# Patient Record
Sex: FEMALE | Race: WHITE | Employment: OTHER
[De-identification: names, ages, dates, MRNs, and addresses within clinical notes are randomized per-mention and may not be internally consistent; named-entity substitution may affect disease eponyms.]

---

## 2017-01-16 ENCOUNTER — NURSE ONLY (OUTPATIENT)
Dept: FAMILY MEDICINE CLINIC | Facility: CLINIC | Age: 67
End: 2017-01-16

## 2017-01-16 VITALS — WEIGHT: 141 LBS | BODY MASS INDEX: 25.79 KG/M2 | TEMPERATURE: 98.5 F

## 2017-01-16 DIAGNOSIS — Z23 NEED FOR PNEUMOCOCCAL VACCINATION: Primary | ICD-10-CM

## 2017-01-16 PROCEDURE — 90471 IMMUNIZATION ADMIN: CPT | Performed by: FAMILY MEDICINE

## 2017-01-16 PROCEDURE — 90670 PCV13 VACCINE IM: CPT | Performed by: FAMILY MEDICINE

## 2017-02-08 ENCOUNTER — OFFICE VISIT (OUTPATIENT)
Dept: GASTROENTEROLOGY | Facility: CLINIC | Age: 67
End: 2017-02-08

## 2017-02-08 VITALS
WEIGHT: 142.8 LBS | OXYGEN SATURATION: 95 % | BODY MASS INDEX: 25.3 KG/M2 | TEMPERATURE: 97.8 F | HEIGHT: 63 IN | HEART RATE: 54 BPM

## 2017-02-08 DIAGNOSIS — K58.9 IRRITABLE BOWEL SYNDROME WITHOUT DIARRHEA: Primary | ICD-10-CM

## 2017-02-08 DIAGNOSIS — R11.0 NAUSEA: ICD-10-CM

## 2017-02-08 PROCEDURE — 99214 OFFICE O/P EST MOD 30 MIN: CPT | Performed by: INTERNAL MEDICINE

## 2017-02-08 ASSESSMENT — ENCOUNTER SYMPTOMS
SORE THROAT: 0
TROUBLE SWALLOWING: 0
SHORTNESS OF BREATH: 0
DIARRHEA: 0
ABDOMINAL PAIN: 0
NAUSEA: 1
CONSTIPATION: 1
VOMITING: 1
BLOOD IN STOOL: 0
SINUS PRESSURE: 0
BACK PAIN: 0
ABDOMINAL DISTENTION: 0
FACIAL SWELLING: 0
RECTAL PAIN: 0
COUGH: 0
VOICE CHANGE: 0
RHINORRHEA: 0
ANAL BLEEDING: 0
ALLERGIC/IMMUNOLOGIC NEGATIVE: 1
RESPIRATORY NEGATIVE: 1

## 2017-02-09 ENCOUNTER — TELEPHONE (OUTPATIENT)
Dept: GASTROENTEROLOGY | Facility: CLINIC | Age: 67
End: 2017-02-09

## 2017-02-15 ENCOUNTER — TELEPHONE (OUTPATIENT)
Dept: FAMILY MEDICINE CLINIC | Facility: CLINIC | Age: 67
End: 2017-02-15

## 2017-02-15 RX ORDER — BENZONATATE 100 MG/1
100 CAPSULE ORAL 3 TIMES DAILY PRN
Qty: 30 CAPSULE | Refills: 0 | Status: SHIPPED | OUTPATIENT
Start: 2017-02-15 | End: 2017-02-22

## 2017-02-15 RX ORDER — AMOXICILLIN 875 MG/1
875 TABLET, COATED ORAL 2 TIMES DAILY
Qty: 20 TABLET | Refills: 0 | Status: SHIPPED | OUTPATIENT
Start: 2017-02-15 | End: 2017-02-25

## 2017-03-13 RX ORDER — PANTOPRAZOLE SODIUM 40 MG/1
40 TABLET, DELAYED RELEASE ORAL DAILY
Qty: 90 TABLET | Refills: 2 | Status: SHIPPED | OUTPATIENT
Start: 2017-03-13 | End: 2018-02-05 | Stop reason: SDUPTHER

## 2017-03-29 ENCOUNTER — OFFICE VISIT (OUTPATIENT)
Dept: FAMILY MEDICINE CLINIC | Age: 67
End: 2017-03-29
Payer: COMMERCIAL

## 2017-03-29 VITALS
DIASTOLIC BLOOD PRESSURE: 70 MMHG | BODY MASS INDEX: 25.38 KG/M2 | SYSTOLIC BLOOD PRESSURE: 110 MMHG | RESPIRATION RATE: 18 BRPM | HEART RATE: 60 BPM | WEIGHT: 141 LBS

## 2017-03-29 DIAGNOSIS — H11.31 SUBCONJUNCTIVAL HEMORRHAGE, RIGHT: ICD-10-CM

## 2017-03-29 DIAGNOSIS — E78.2 MIXED HYPERLIPIDEMIA: Primary | ICD-10-CM

## 2017-03-29 DIAGNOSIS — G89.29 CHRONIC LEFT SHOULDER PAIN: ICD-10-CM

## 2017-03-29 DIAGNOSIS — M25.512 CHRONIC LEFT SHOULDER PAIN: ICD-10-CM

## 2017-03-29 PROCEDURE — 99214 OFFICE O/P EST MOD 30 MIN: CPT | Performed by: NURSE PRACTITIONER

## 2017-03-29 ASSESSMENT — PATIENT HEALTH QUESTIONNAIRE - PHQ9
SUM OF ALL RESPONSES TO PHQ QUESTIONS 1-9: 0
1. LITTLE INTEREST OR PLEASURE IN DOING THINGS: 0
SUM OF ALL RESPONSES TO PHQ9 QUESTIONS 1 & 2: 0
2. FEELING DOWN, DEPRESSED OR HOPELESS: 0

## 2017-03-30 ASSESSMENT — ENCOUNTER SYMPTOMS
WHEEZING: 0
CHEST TIGHTNESS: 0
NAUSEA: 0
VOMITING: 0
BLOOD IN STOOL: 0
ABDOMINAL PAIN: 0
SHORTNESS OF BREATH: 0
EYE REDNESS: 1

## 2017-09-27 ENCOUNTER — OFFICE VISIT (OUTPATIENT)
Dept: FAMILY MEDICINE CLINIC | Age: 67
End: 2017-09-27
Payer: COMMERCIAL

## 2017-09-27 VITALS
HEART RATE: 68 BPM | RESPIRATION RATE: 16 BRPM | DIASTOLIC BLOOD PRESSURE: 60 MMHG | SYSTOLIC BLOOD PRESSURE: 110 MMHG | WEIGHT: 142 LBS | BODY MASS INDEX: 25.16 KG/M2 | HEIGHT: 63 IN

## 2017-09-27 DIAGNOSIS — K21.9 GASTROESOPHAGEAL REFLUX DISEASE WITHOUT ESOPHAGITIS: ICD-10-CM

## 2017-09-27 DIAGNOSIS — Z23 NEED FOR INFLUENZA VACCINATION: ICD-10-CM

## 2017-09-27 DIAGNOSIS — E78.2 MIXED HYPERLIPIDEMIA: Primary | ICD-10-CM

## 2017-09-27 PROCEDURE — 99214 OFFICE O/P EST MOD 30 MIN: CPT | Performed by: NURSE PRACTITIONER

## 2017-09-27 PROCEDURE — 90686 IIV4 VACC NO PRSV 0.5 ML IM: CPT | Performed by: NURSE PRACTITIONER

## 2017-09-27 PROCEDURE — 90471 IMMUNIZATION ADMIN: CPT | Performed by: NURSE PRACTITIONER

## 2017-09-27 ASSESSMENT — ENCOUNTER SYMPTOMS
VOMITING: 0
SHORTNESS OF BREATH: 0
WHEEZING: 0
ABDOMINAL PAIN: 0
NAUSEA: 0

## 2017-09-28 DIAGNOSIS — Z12.39 BREAST CANCER SCREENING: ICD-10-CM

## 2017-09-28 DIAGNOSIS — Z12.39 BREAST CANCER SCREENING: Primary | ICD-10-CM

## 2018-02-05 ENCOUNTER — OFFICE VISIT (OUTPATIENT)
Dept: GASTROENTEROLOGY | Age: 68
End: 2018-02-05
Payer: COMMERCIAL

## 2018-02-05 VITALS
BODY MASS INDEX: 25.82 KG/M2 | HEIGHT: 63 IN | DIASTOLIC BLOOD PRESSURE: 68 MMHG | WEIGHT: 145.7 LBS | HEART RATE: 55 BPM | OXYGEN SATURATION: 96 % | SYSTOLIC BLOOD PRESSURE: 130 MMHG

## 2018-02-05 DIAGNOSIS — K58.9 IRRITABLE BOWEL SYNDROME WITHOUT DIARRHEA: ICD-10-CM

## 2018-02-05 DIAGNOSIS — Z80.0 FAMILY HISTORY OF COLON CANCER: Primary | ICD-10-CM

## 2018-02-05 DIAGNOSIS — K57.30 DIVERTICULOSIS OF COLON: ICD-10-CM

## 2018-02-05 DIAGNOSIS — R11.0 NAUSEA: ICD-10-CM

## 2018-02-05 PROCEDURE — 99214 OFFICE O/P EST MOD 30 MIN: CPT | Performed by: INTERNAL MEDICINE

## 2018-02-05 RX ORDER — PANTOPRAZOLE SODIUM 40 MG/1
40 TABLET, DELAYED RELEASE ORAL DAILY
Qty: 90 TABLET | Refills: 2 | Status: SHIPPED | OUTPATIENT
Start: 2018-02-05 | End: 2020-02-04 | Stop reason: SDUPTHER

## 2018-02-05 ASSESSMENT — ENCOUNTER SYMPTOMS
BACK PAIN: 0
RHINORRHEA: 0
SINUS PAIN: 0
DIARRHEA: 0
RECTAL PAIN: 0
CONSTIPATION: 0
NAUSEA: 1
COUGH: 0
ABDOMINAL PAIN: 0
VOICE CHANGE: 0
FACIAL SWELLING: 0
VOMITING: 0
RESPIRATORY NEGATIVE: 1
TROUBLE SWALLOWING: 0
WHEEZING: 0
ANAL BLEEDING: 0
SINUS PRESSURE: 0
SORE THROAT: 0
ALLERGIC/IMMUNOLOGIC NEGATIVE: 1
SHORTNESS OF BREATH: 0
ABDOMINAL DISTENTION: 0
BLOOD IN STOOL: 0

## 2018-02-06 RX ORDER — POLYETHYLENE GLYCOL 3350 17 G/17G
POWDER, FOR SOLUTION ORAL
Qty: 255 G | Refills: 0 | Status: SHIPPED | OUTPATIENT
Start: 2018-02-06 | End: 2018-03-07

## 2018-02-09 ENCOUNTER — HOSPITAL ENCOUNTER (OUTPATIENT)
Age: 68
Setting detail: OUTPATIENT SURGERY
Discharge: HOME | End: 2018-02-09
Attending: INTERNAL MEDICINE | Admitting: INTERNAL MEDICINE
Payer: COMMERCIAL

## 2018-02-09 VITALS
BODY MASS INDEX: 25.34 KG/M2 | OXYGEN SATURATION: 100 % | HEIGHT: 63 IN | WEIGHT: 143 LBS | RESPIRATION RATE: 16 BRPM | SYSTOLIC BLOOD PRESSURE: 123 MMHG | HEART RATE: 55 BPM | DIASTOLIC BLOOD PRESSURE: 61 MMHG | TEMPERATURE: 97.9 F

## 2018-02-09 PROCEDURE — 2580000003 HC RX 258: Performed by: INTERNAL MEDICINE

## 2018-02-09 PROCEDURE — 99153 MOD SED SAME PHYS/QHP EA: CPT | Performed by: INTERNAL MEDICINE

## 2018-02-09 PROCEDURE — 6360000002 HC RX W HCPCS: Performed by: INTERNAL MEDICINE

## 2018-02-09 PROCEDURE — 99152 MOD SED SAME PHYS/QHP 5/>YRS: CPT | Performed by: INTERNAL MEDICINE

## 2018-02-09 PROCEDURE — 7100000011 HC PHASE II RECOVERY - ADDTL 15 MIN: Performed by: INTERNAL MEDICINE

## 2018-02-09 PROCEDURE — 88305 TISSUE EXAM BY PATHOLOGIST: CPT

## 2018-02-09 PROCEDURE — 7100000010 HC PHASE II RECOVERY - FIRST 15 MIN: Performed by: INTERNAL MEDICINE

## 2018-02-09 PROCEDURE — 3609010600 HC COLONOSCOPY POLYPECTOMY SNARE/COLD BIOPSY: Performed by: INTERNAL MEDICINE

## 2018-02-09 RX ORDER — SODIUM CHLORIDE 9 MG/ML
INJECTION, SOLUTION INTRAVENOUS CONTINUOUS
Status: DISCONTINUED | OUTPATIENT
Start: 2018-02-09 | End: 2018-02-09 | Stop reason: HOSPADM

## 2018-02-09 RX ORDER — FENTANYL CITRATE 50 UG/ML
INJECTION, SOLUTION INTRAMUSCULAR; INTRAVENOUS PRN
Status: DISCONTINUED | OUTPATIENT
Start: 2018-02-09 | End: 2018-02-09 | Stop reason: HOSPADM

## 2018-02-09 RX ORDER — MIDAZOLAM HYDROCHLORIDE 1 MG/ML
INJECTION INTRAMUSCULAR; INTRAVENOUS PRN
Status: DISCONTINUED | OUTPATIENT
Start: 2018-02-09 | End: 2018-02-09 | Stop reason: HOSPADM

## 2018-02-09 RX ADMIN — SODIUM CHLORIDE: 9 INJECTION, SOLUTION INTRAVENOUS at 10:27

## 2018-02-09 ASSESSMENT — PAIN SCALES - GENERAL
PAINLEVEL_OUTOF10: 0
PAINLEVEL_OUTOF10: 0

## 2018-02-09 ASSESSMENT — PAIN - FUNCTIONAL ASSESSMENT: PAIN_FUNCTIONAL_ASSESSMENT: 0-10

## 2018-02-09 NOTE — OP NOTE
The colon was decompressed and the scope was removed. The patient tolerated the procedure and conscious sedation without unusual events. Recommendations/Plan:   1. F/U Biopsies  2. F/U In Office as instructed  3. Discussed with the family  4. High fiber diet   5. Precautions to avoid constipation     Next colonoscopy: 10 years.       Electronically signed by Albina Mcallister MD  on 2/9/2018 at 11:26 AM

## 2018-02-09 NOTE — H&P
GASTROINTESTINAL ENDOSCOPY  4/25/14    esophagitis with bx     FAMILY HISTORY       Family History   Problem Relation Age of Onset    Other Mother      had many stomach surgeries    COPD Father     Cancer Father      colon    Other Brother      sepsis    Cancer Maternal Grandfather      colon     SOCIAL HISTORY       Social History     Social History    Marital status:      Spouse name: N/A    Number of children: N/A    Years of education: N/A     Social History Main Topics    Smoking status: Former Smoker     Quit date: 1/1/1990    Smokeless tobacco: Never Used    Alcohol use Yes      Comment: occasional    Drug use: No    Sexual activity: Not Asked     Other Topics Concern    None     Social History Narrative    None     REVIEW OF SYSTEMS      Allergies   Allergen Reactions    Nickel Rash    Morphine And Related Other (See Comments)    Nalbuphine      Other reaction(s): claustophobia; confusion    Sulfa Antibiotics Hives    Formaldehyde Rash     No current facility-administered medications on file prior to encounter. Current Outpatient Prescriptions on File Prior to Encounter   Medication Sig Dispense Refill    polyethylene glycol (GLYCOLAX) powder Please dispense with 4 dulcolax tabs with this prescription. Use as directed by following your patient instructions given by office. 255 g 0    Cyanocobalamin (VITAMIN B-12 CR PO) Take by mouth      Coenzyme Q10 (COQ10 PO) Take by mouth      pantoprazole (PROTONIX) 40 MG tablet Take 1 tablet by mouth daily 90 tablet 2    Multiple Vitamins-Minerals (MULTIVITAMIN PO) Take by mouth      vitamin D (ERGOCALCIFEROL) 400 UNITS CAPS Take 400 Units by mouth daily      Calcium Carbonate-Vitamin D (CALCIUM + D) 600-200 MG-UNIT TABS Take  by mouth.  dicyclomine (BENTYL) 20 MG tablet Take 1 tablet by mouth every 6 hours 80 tablet 0     General health:  Fairly good. No fever or chills. Skin:  No itching, redness or rash. HEENT:  No headache, epistaxis or sore throat. Glasses. Neck:  No pain, stiffness. Thyroid nodules, benign. Cardiovascular/Respiratory system:  No chest pain, palpitation or shortness of breath. Gastrointestinal tract: See HPI. Genitourinary:  No burning on micturition. No hesitancy, urgency, frequency or discoloration of urine. Locomotor:  No bone or joint pains. No swelling. Neuropsychiatric:  No referable complaints. GENERAL PHYSICAL EXAM:     Vitals: BP (!) 107/52   Pulse 54   Temp 97.2 °F (36.2 °C) (Oral)   Resp 18   Ht 5' 2.5\" (1.588 m)   Wt 143 lb (64.9 kg)   SpO2 95%   BMI 25.74 kg/m²  Body mass index is 25.74 kg/m². GENERAL APPEARANCE:   Lianet Rhodes is 78 y.o.,  female, not obese, nourished, conscious, alert. Does not appear to be distress or pain at this time. Patient resting comfortably in bed. SKIN:  Warm, dry, no cyanosis or jaundice. HEAD:  Normocephalic, atraumatic, no swelling or tenderness. EYES:  Pupils equal, reactive to light and accomodation. Conjunctiva clear. EOMs intact bilaterally. EARS:  No discharge, no marked hearing loss. NOSE:  No rhinorrhea, epistaxis or septal deformity. THROAT:  Mucous membranes moist. No tonsillar erythema or exudates. NECK:  No stiffness, trachea central.  No palpable masses. CHEST:  Symmetrical and equal on expansion. HEART:  Regular rate, rhythm. S1 > S2. No audible murmurs or gallops. LUNGS:  Equal on expansion. Clear to auscultation with no adventitious sounds. ABDOMEN: Normoactive bowel sounds. Soft on palpation. No localized tenderness. No guarding or rigidity. No palpable organomegaly.               LYMPHATICS:  No palpable cervical

## 2018-02-12 LAB — SURGICAL PATHOLOGY REPORT: NORMAL

## 2018-03-20 DIAGNOSIS — Z80.0 FAMILY HISTORY OF COLON CANCER: ICD-10-CM

## 2018-03-27 ENCOUNTER — OFFICE VISIT (OUTPATIENT)
Dept: FAMILY MEDICINE CLINIC | Age: 68
End: 2018-03-27
Payer: COMMERCIAL

## 2018-03-27 VITALS
HEART RATE: 58 BPM | RESPIRATION RATE: 14 BRPM | DIASTOLIC BLOOD PRESSURE: 70 MMHG | WEIGHT: 144.4 LBS | SYSTOLIC BLOOD PRESSURE: 130 MMHG | TEMPERATURE: 98 F | BODY MASS INDEX: 25.99 KG/M2

## 2018-03-27 DIAGNOSIS — I10 ESSENTIAL HYPERTENSION: Primary | ICD-10-CM

## 2018-03-27 DIAGNOSIS — E78.2 MIXED HYPERLIPIDEMIA: ICD-10-CM

## 2018-03-27 PROCEDURE — 99214 OFFICE O/P EST MOD 30 MIN: CPT | Performed by: NURSE PRACTITIONER

## 2018-03-27 ASSESSMENT — ENCOUNTER SYMPTOMS
NAUSEA: 0
WHEEZING: 0
VOMITING: 0
SHORTNESS OF BREATH: 0
ABDOMINAL PAIN: 0

## 2018-03-27 NOTE — PROGRESS NOTES
Subjective:      Patient ID: Miguel Kam is a 78 y.o. female. Chronic Disease Visit Information    BP Readings from Last 3 Encounters:   03/27/18 130/70   02/09/18 123/61   02/05/18 130/68          LDL Cholesterol (mg/dL)   Date Value   01/16/2017 83     LDL Calculated (mg/dL)   Date Value   06/17/2015 75     HDL (mg/dL)   Date Value   01/16/2017 53     BUN (mg/dL)   Date Value   01/16/2017 18     CREATININE (mg/dL)   Date Value   01/16/2017 0.57     Glucose (mg/dL)   Date Value   01/16/2017 101 (H)            Have you changed or started any medications since your last visit including any over-the-counter medicines, vitamins, or herbal medicines? no   Are you having any side effects from any of your medications? -  no  Have you stopped taking any of your medications? Is so, why? -  no    Have you seen any other physician or provider since your last visit? Yes - Records Obtained  Have you had any other diagnostic tests since your last visit? No  Have you been seen in the emergency room and/or had an admission to a hospital since we last saw you?yes  Have you had your annual diabetic retinal (eye) exam? No  Have you had your routine dental cleaning in the past 6 months? yes -     Have you activated your [employer] account? If not, what are your barriers?  Yes     Patient Care Team:  Eino Landau, MD as PCP - General  Lanny Sacks, MD as Consulting Physician (Gastroenterology)  Jillian Winchester MD as Consulting Physician (Rheumatology)  Ntaali Joiner MD (Dermatology)  Heriberto Suazo MD as Consulting Physician (Allergy & Immunology)         Medical History Review  Past Medical, Family, and Social History reviewed and does contribute to the patient presenting condition    Health Maintenance   Topic Date Due    DTaP/Tdap/Td vaccine (1 - Tdap) 07/23/1970    Diabetes screen  07/23/1991    Shingles Vaccine (1 of 2 - 2 Dose Series) 07/23/2001    Breast cancer screen  06/20/2019    Pneumococcal Temp 98 °F (36.7 °C) (Oral)   Resp 14   Wt 144 lb 6.4 oz (65.5 kg)   BMI 25.99 kg/m²   Lab Results   Component Value Date    WBC 6.1 01/16/2017    HGB 13.6 01/16/2017    HCT 41.0 01/16/2017     01/16/2017    CHOL 155 01/16/2017    TRIG 93 01/16/2017    HDL 53 01/16/2017    ALT 15 11/21/2015    AST 15 11/21/2015     (H) 01/16/2017    K 4.6 01/16/2017     (H) 01/16/2017    CREATININE 0.57 01/16/2017    BUN 18 01/16/2017    CO2 28 01/16/2017    TSH 3.67 06/17/2015     Lab Results   Component Value Date    CALCIUM 9.2 01/16/2017     Lab Results   Component Value Date    LDLCALC 75 06/17/2015    LDLCHOLESTEROL 83 01/16/2017         1. Essential hypertension  - stable. Cont to monitor.   - Basic Metabolic Panel; Future    2. Mixed hyperlipidemia  - stable. Cont diet measures. - Lipid Panel;   - Diet, exercise and weight reduction discussed. Requested Prescriptions      No prescriptions requested or ordered in this encounter       There are no discontinued medications. Juan Barrett received counseling on the following healthy behaviors: nutrition and exercise  Reviewed prior labs and health maintenance  Continue current medications, diet and exercise. Discussed use, benefit, and side effects of prescribed medications. Barriers to medication compliance addressed. Patient given educational materials - see patient instructions  Was a self-tracking handout given in paper form or via [employer]? Yes    Requested Prescriptions      No prescriptions requested or ordered in this encounter       All patient questions answered. Patient voiced understanding. Quality Measures    Body mass index is 25.99 kg/m². Normal. Weight control planned discussed medically supervised diet with primary care physician and Healthy diet and regular exercise. BP: 130/70. Blood pressure is normal. Treatment plan consists of No treatment change needed. Fall Risk 11/23/2016   2 or more falls in past year?  no

## 2018-06-26 ENCOUNTER — OFFICE VISIT (OUTPATIENT)
Dept: OBGYN CLINIC | Age: 68
End: 2018-06-26

## 2018-06-26 VITALS
SYSTOLIC BLOOD PRESSURE: 112 MMHG | BODY MASS INDEX: 26.79 KG/M2 | HEIGHT: 62 IN | DIASTOLIC BLOOD PRESSURE: 70 MMHG | WEIGHT: 145.6 LBS

## 2018-06-26 DIAGNOSIS — Z12.31 ENCOUNTER FOR SCREENING MAMMOGRAM FOR BREAST CANCER: ICD-10-CM

## 2018-06-26 DIAGNOSIS — Z01.419 ENCOUNTER FOR GYNECOLOGICAL EXAMINATION: Primary | ICD-10-CM

## 2018-06-26 PROCEDURE — G0101 CA SCREEN;PELVIC/BREAST EXAM: HCPCS | Performed by: NURSE PRACTITIONER

## 2018-06-26 ASSESSMENT — ENCOUNTER SYMPTOMS
CONSTIPATION: 0
ABDOMINAL DISTENTION: 0
DIARRHEA: 0
ABDOMINAL PAIN: 0
BACK PAIN: 0
SHORTNESS OF BREATH: 0
COUGH: 0

## 2018-08-03 ENCOUNTER — HOSPITAL ENCOUNTER (OUTPATIENT)
Dept: MAMMOGRAPHY | Age: 68
Discharge: HOME | End: 2018-08-05
Payer: MEDICARE

## 2018-08-03 DIAGNOSIS — Z12.31 ENCOUNTER FOR SCREENING MAMMOGRAM FOR BREAST CANCER: ICD-10-CM

## 2018-08-03 PROCEDURE — 77063 BREAST TOMOSYNTHESIS BI: CPT

## 2018-09-19 ENCOUNTER — HOSPITAL ENCOUNTER (OUTPATIENT)
Age: 68
Discharge: HOME | End: 2018-09-19
Payer: MEDICARE

## 2018-09-19 DIAGNOSIS — E78.2 MIXED HYPERLIPIDEMIA: ICD-10-CM

## 2018-09-19 DIAGNOSIS — I10 ESSENTIAL HYPERTENSION: ICD-10-CM

## 2018-09-19 LAB
ANION GAP SERPL CALCULATED.3IONS-SCNC: 11 MMOL/L (ref 9–17)
BUN BLDV-MCNC: 17 MG/DL (ref 8–23)
BUN/CREAT BLD: ABNORMAL (ref 9–20)
CALCIUM SERPL-MCNC: 9 MG/DL (ref 8.6–10.4)
CHLORIDE BLD-SCNC: 107 MMOL/L (ref 98–107)
CHOLESTEROL/HDL RATIO: 2.7
CHOLESTEROL: 159 MG/DL
CO2: 27 MMOL/L (ref 20–31)
CREAT SERPL-MCNC: 0.59 MG/DL (ref 0.5–0.9)
GFR AFRICAN AMERICAN: >60 ML/MIN
GFR NON-AFRICAN AMERICAN: >60 ML/MIN
GFR SERPL CREATININE-BSD FRML MDRD: ABNORMAL ML/MIN/{1.73_M2}
GFR SERPL CREATININE-BSD FRML MDRD: ABNORMAL ML/MIN/{1.73_M2}
GLUCOSE BLD-MCNC: 92 MG/DL (ref 70–99)
HDLC SERPL-MCNC: 58 MG/DL
LDL CHOLESTEROL: 83 MG/DL (ref 0–130)
POTASSIUM SERPL-SCNC: 4 MMOL/L (ref 3.7–5.3)
SODIUM BLD-SCNC: 145 MMOL/L (ref 135–144)
TRIGL SERPL-MCNC: 88 MG/DL
VLDLC SERPL CALC-MCNC: NORMAL MG/DL (ref 1–30)

## 2018-09-19 PROCEDURE — 80061 LIPID PANEL: CPT

## 2018-09-19 PROCEDURE — 80048 BASIC METABOLIC PNL TOTAL CA: CPT

## 2018-09-19 PROCEDURE — 36415 COLL VENOUS BLD VENIPUNCTURE: CPT

## 2018-09-26 ENCOUNTER — OFFICE VISIT (OUTPATIENT)
Dept: FAMILY MEDICINE CLINIC | Age: 68
End: 2018-09-26
Payer: MEDICARE

## 2018-09-26 VITALS
RESPIRATION RATE: 14 BRPM | TEMPERATURE: 97.2 F | WEIGHT: 145 LBS | DIASTOLIC BLOOD PRESSURE: 80 MMHG | SYSTOLIC BLOOD PRESSURE: 108 MMHG | HEART RATE: 58 BPM | BODY MASS INDEX: 26.52 KG/M2

## 2018-09-26 DIAGNOSIS — I10 ESSENTIAL HYPERTENSION: ICD-10-CM

## 2018-09-26 DIAGNOSIS — R21 RASH AND NONSPECIFIC SKIN ERUPTION: ICD-10-CM

## 2018-09-26 DIAGNOSIS — Z23 NEED FOR IMMUNIZATION AGAINST INFLUENZA: Primary | ICD-10-CM

## 2018-09-26 DIAGNOSIS — E78.2 MIXED HYPERLIPIDEMIA: ICD-10-CM

## 2018-09-26 PROCEDURE — 3017F COLORECTAL CA SCREEN DOC REV: CPT | Performed by: NURSE PRACTITIONER

## 2018-09-26 PROCEDURE — 1036F TOBACCO NON-USER: CPT | Performed by: NURSE PRACTITIONER

## 2018-09-26 PROCEDURE — G8427 DOCREV CUR MEDS BY ELIG CLIN: HCPCS | Performed by: NURSE PRACTITIONER

## 2018-09-26 PROCEDURE — 99214 OFFICE O/P EST MOD 30 MIN: CPT | Performed by: NURSE PRACTITIONER

## 2018-09-26 PROCEDURE — 1101F PT FALLS ASSESS-DOCD LE1/YR: CPT | Performed by: NURSE PRACTITIONER

## 2018-09-26 PROCEDURE — 1123F ACP DISCUSS/DSCN MKR DOCD: CPT | Performed by: NURSE PRACTITIONER

## 2018-09-26 PROCEDURE — G0008 ADMIN INFLUENZA VIRUS VAC: HCPCS | Performed by: NURSE PRACTITIONER

## 2018-09-26 PROCEDURE — G8399 PT W/DXA RESULTS DOCUMENT: HCPCS | Performed by: NURSE PRACTITIONER

## 2018-09-26 PROCEDURE — 90662 IIV NO PRSV INCREASED AG IM: CPT | Performed by: NURSE PRACTITIONER

## 2018-09-26 PROCEDURE — 1090F PRES/ABSN URINE INCON ASSESS: CPT | Performed by: NURSE PRACTITIONER

## 2018-09-26 PROCEDURE — 4040F PNEUMOC VAC/ADMIN/RCVD: CPT | Performed by: NURSE PRACTITIONER

## 2018-09-26 PROCEDURE — G8419 CALC BMI OUT NRM PARAM NOF/U: HCPCS | Performed by: NURSE PRACTITIONER

## 2018-09-26 RX ORDER — AMOXICILLIN AND CLAVULANATE POTASSIUM 875; 125 MG/1; MG/1
1 TABLET, FILM COATED ORAL 2 TIMES DAILY
Qty: 14 TABLET | Refills: 0 | Status: SHIPPED | OUTPATIENT
Start: 2018-09-26 | End: 2018-10-03

## 2018-09-26 RX ORDER — HYDROXYZINE HYDROCHLORIDE 25 MG/1
25 TABLET, FILM COATED ORAL 3 TIMES DAILY PRN
Qty: 30 TABLET | Refills: 0 | Status: SHIPPED | OUTPATIENT
Start: 2018-09-26 | End: 2018-10-06

## 2018-09-26 RX ORDER — TRIAMCINOLONE ACETONIDE 1 MG/G
CREAM TOPICAL
Qty: 28.4 G | Refills: 0 | Status: SHIPPED | OUTPATIENT
Start: 2018-09-26 | End: 2020-07-20 | Stop reason: ALTCHOICE

## 2018-09-26 ASSESSMENT — PATIENT HEALTH QUESTIONNAIRE - PHQ9
SUM OF ALL RESPONSES TO PHQ QUESTIONS 1-9: 0
2. FEELING DOWN, DEPRESSED OR HOPELESS: 0
1. LITTLE INTEREST OR PLEASURE IN DOING THINGS: 0
SUM OF ALL RESPONSES TO PHQ QUESTIONS 1-9: 0
SUM OF ALL RESPONSES TO PHQ9 QUESTIONS 1 & 2: 0

## 2018-09-26 ASSESSMENT — ENCOUNTER SYMPTOMS
NAUSEA: 0
WHEEZING: 0
SHORTNESS OF BREATH: 0
ABDOMINAL PAIN: 0

## 2018-09-26 NOTE — PROGRESS NOTES
Subjective:      Patient ID: Chilo Garcia is a 80 y.o. female. Vaccine Information Sheet, \"Influenza - Inactivated\"  given to Chilo Garcia, or parent/legal guardian of  Chilo Garcia and verbalized understanding. Patient responses:    Have you ever had a reaction to a flu vaccine? No  Are you able to eat eggs without adverse effects? Yes  Do you have any current illness? No  Have you ever had [place] Syndrome? No    Flu vaccine given per order. Please see immunization tab. HPI  Chronic Disease Visit Information    BP Readings from Last 3 Encounters:   09/26/18 108/80   06/26/18 112/70   03/27/18 130/70          LDL Cholesterol (mg/dL)   Date Value   09/19/2018 83     LDL Calculated (mg/dL)   Date Value   06/17/2015 75     HDL (mg/dL)   Date Value   09/19/2018 58     BUN (mg/dL)   Date Value   09/19/2018 17     CREATININE (mg/dL)   Date Value   09/19/2018 0.59     Glucose (mg/dL)   Date Value   09/19/2018 92            Have you changed or started any medications since your last visit including any over-the-counter medicines, vitamins, or herbal medicines? no   Are you having any side effects from any of your medications? -  no  Have you stopped taking any of your medications? Is so, why? -  no    Have you seen any other physician or provider since your last visit? Yes - Records Obtained  Have you had any other diagnostic tests since your last visit? Yes - Records Obtained  Have you been seen in the emergency room and/or had an admission to a hospital since we last saw you? No  Have you had your annual diabetic retinal (eye) exam? No  Have you had your routine dental cleaning in the past 6 months? yes -     Have you activated your [employer] account? If not, what are your barriers?  Yes     Patient Care Team:  Anant Tong MD as PCP - General  Darren Leo MD as Consulting Physician (Gastroenterology)  Steven Chang MD as Consulting Physician (Rheumatology)  Wai Belcher Pulmonary/Chest: Effort normal and breath sounds normal. No respiratory distress. Abdominal: Soft. There is no tenderness. Musculoskeletal: Normal range of motion. Lymphadenopathy:     She has no cervical adenopathy. Neurological: She is alert. No cranial nerve deficit. Skin: Rash noted. Psychiatric: She has a normal mood and affect. Her behavior is normal.   Nursing note and vitals reviewed. Assessment:      1. Essential hypertension    2. Mixed hyperlipidemia    3. Rash and nonspecific skin eruption            Plan:         BP Readings from Last 3 Encounters:   09/26/18 108/80   06/26/18 112/70   03/27/18 130/70     /80 (Site: Right Upper Arm, Position: Sitting, Cuff Size: Medium Adult)   Pulse 58   Temp 97.2 °F (36.2 °C) (Oral)   Resp 14   Wt 150 lb (68 kg)   BMI 27.44 kg/m²   Lab Results   Component Value Date    WBC 6.1 01/16/2017    HGB 13.6 01/16/2017    HCT 41.0 01/16/2017     01/16/2017    CHOL 159 09/19/2018    TRIG 88 09/19/2018    HDL 58 09/19/2018    ALT 15 11/21/2015    AST 15 11/21/2015     (H) 09/19/2018    K 4.0 09/19/2018     09/19/2018    CREATININE 0.59 09/19/2018    BUN 17 09/19/2018    CO2 27 09/19/2018    TSH 3.67 06/17/2015     Lab Results   Component Value Date    CALCIUM 9.0 09/19/2018     Lab Results   Component Value Date    LDLCALC 75 06/17/2015    LDLCHOLESTEROL 83 09/19/2018         1. Essential hypertension  - stable. Cont to monitor   - discussed low salt intake    2. Mixed hyperlipidemia  - stable cont diet measures. 3. Rash and nonspecific skin eruption  - hydrOXYzine (ATARAX) 25 MG tablet; Take 1 tablet by mouth 3 times daily as needed for Itching  Dispense: 30 tablet; Refill: 0  - triamcinolone (KENALOG) 0.1 % cream; Apply topically 2 times daily. Dispense: 28.4 g; Refill: 0  - amoxicillin-clavulanate (AUGMENTIN) 875-125 MG per tablet; Take 1 tablet by mouth 2 times daily for 7 days  Dispense: 14 tablet;  Refill: 0    Requested Prescriptions     Signed Prescriptions Disp Refills    hydrOXYzine (ATARAX) 25 MG tablet 30 tablet 0     Sig: Take 1 tablet by mouth 3 times daily as needed for Itching    triamcinolone (KENALOG) 0.1 % cream 28.4 g 0     Sig: Apply topically 2 times daily.  amoxicillin-clavulanate (AUGMENTIN) 875-125 MG per tablet 14 tablet 0     Sig: Take 1 tablet by mouth 2 times daily for 7 days       Medications Discontinued During This Encounter   Medication Reason    triamcinolone (KENALOG) 0.1 % ointment MITCHELL Shaw received counseling on the following healthy behaviors: nutrition, exercise and weight reduction  Reviewed prior labs and health maintenance  Continue current medications, diet and exercise. Discussed use, benefit, and side effects of prescribed medications. Barriers to medication compliance addressed. Patient given educational materials - see patient instructions  Was a self-tracking handout given in paper form or via [employer]? Yes    Requested Prescriptions     Signed Prescriptions Disp Refills    hydrOXYzine (ATARAX) 25 MG tablet 30 tablet 0     Sig: Take 1 tablet by mouth 3 times daily as needed for Itching    triamcinolone (KENALOG) 0.1 % cream 28.4 g 0     Sig: Apply topically 2 times daily.  amoxicillin-clavulanate (AUGMENTIN) 875-125 MG per tablet 14 tablet 0     Sig: Take 1 tablet by mouth 2 times daily for 7 days       All patient questions answered. Patient voiced understanding. Quality Measures    Body mass index is 27.44 kg/m². Elevated. Weight control planned discussed medically supervised diet with primary care physician and Healthy diet and regular exercise. BP: 108/80. Blood pressure is normal. Treatment plan consists of Weight Reduction, Dietary Sodium Restriction and No treatment change needed.     Fall Risk 9/26/2018 11/23/2016   2 or more falls in past year? no no   Fall with injury in past year? no no     The patient does not have a history of falls. I did , complete a risk assessment for falls.  A plan of care for falls in-office gait and balance testing performed using The Timed Up and Go Test was negative for increased falls risk- no further intervention is currently indicated, No Treatment plan indicated    Lab Results   Component Value Date    LDLCALC 75 06/17/2015    LDLCHOLESTEROL 83 09/19/2018    (goal LDL reduction with dx if diabetes is 50% LDL reduction)    PHQ Scores 9/26/2018 3/29/2017   PHQ2 Score 0 0   PHQ9 Score 0 0     Interpretation of Total Score Depression Severity: 1-4 = Minimal depression, 5-9 = Mild depression, 10-14 = Moderate depression, 15-19 = Moderately severe depression, 20-27 = Severe depression          Kiki Anton, APRN - CNP

## 2019-03-20 ENCOUNTER — OFFICE VISIT (OUTPATIENT)
Dept: FAMILY MEDICINE CLINIC | Age: 69
End: 2019-03-20
Payer: MEDICARE

## 2019-03-20 VITALS
WEIGHT: 153 LBS | RESPIRATION RATE: 16 BRPM | TEMPERATURE: 97.1 F | BODY MASS INDEX: 27.98 KG/M2 | DIASTOLIC BLOOD PRESSURE: 78 MMHG | SYSTOLIC BLOOD PRESSURE: 118 MMHG | HEART RATE: 54 BPM

## 2019-03-20 DIAGNOSIS — I10 ESSENTIAL HYPERTENSION: Primary | ICD-10-CM

## 2019-03-20 DIAGNOSIS — Z78.0 POSTMENOPAUSE: ICD-10-CM

## 2019-03-20 DIAGNOSIS — E78.2 MIXED HYPERLIPIDEMIA: ICD-10-CM

## 2019-03-20 PROCEDURE — G8427 DOCREV CUR MEDS BY ELIG CLIN: HCPCS | Performed by: NURSE PRACTITIONER

## 2019-03-20 PROCEDURE — 1090F PRES/ABSN URINE INCON ASSESS: CPT | Performed by: NURSE PRACTITIONER

## 2019-03-20 PROCEDURE — 1123F ACP DISCUSS/DSCN MKR DOCD: CPT | Performed by: NURSE PRACTITIONER

## 2019-03-20 PROCEDURE — 4040F PNEUMOC VAC/ADMIN/RCVD: CPT | Performed by: NURSE PRACTITIONER

## 2019-03-20 PROCEDURE — G8482 FLU IMMUNIZE ORDER/ADMIN: HCPCS | Performed by: NURSE PRACTITIONER

## 2019-03-20 PROCEDURE — G8399 PT W/DXA RESULTS DOCUMENT: HCPCS | Performed by: NURSE PRACTITIONER

## 2019-03-20 PROCEDURE — 3017F COLORECTAL CA SCREEN DOC REV: CPT | Performed by: NURSE PRACTITIONER

## 2019-03-20 PROCEDURE — 99214 OFFICE O/P EST MOD 30 MIN: CPT | Performed by: NURSE PRACTITIONER

## 2019-03-20 PROCEDURE — G8419 CALC BMI OUT NRM PARAM NOF/U: HCPCS | Performed by: NURSE PRACTITIONER

## 2019-03-20 PROCEDURE — 1036F TOBACCO NON-USER: CPT | Performed by: NURSE PRACTITIONER

## 2019-03-20 PROCEDURE — 1101F PT FALLS ASSESS-DOCD LE1/YR: CPT | Performed by: NURSE PRACTITIONER

## 2019-03-20 RX ORDER — VALACYCLOVIR HYDROCHLORIDE 1 G/1
1000 TABLET, FILM COATED ORAL 2 TIMES DAILY
COMMUNITY

## 2019-03-20 ASSESSMENT — PATIENT HEALTH QUESTIONNAIRE - PHQ9
2. FEELING DOWN, DEPRESSED OR HOPELESS: 0
1. LITTLE INTEREST OR PLEASURE IN DOING THINGS: 0
SUM OF ALL RESPONSES TO PHQ QUESTIONS 1-9: 0
SUM OF ALL RESPONSES TO PHQ9 QUESTIONS 1 & 2: 0
SUM OF ALL RESPONSES TO PHQ QUESTIONS 1-9: 0

## 2019-03-20 ASSESSMENT — ENCOUNTER SYMPTOMS
VOMITING: 0
ABDOMINAL PAIN: 0
WHEEZING: 0
NAUSEA: 0
SHORTNESS OF BREATH: 0

## 2019-06-28 ENCOUNTER — OFFICE VISIT (OUTPATIENT)
Dept: FAMILY MEDICINE CLINIC | Age: 69
End: 2019-06-28
Payer: MEDICARE

## 2019-06-28 VITALS
BODY MASS INDEX: 28.53 KG/M2 | RESPIRATION RATE: 14 BRPM | HEART RATE: 56 BPM | TEMPERATURE: 97.6 F | DIASTOLIC BLOOD PRESSURE: 68 MMHG | SYSTOLIC BLOOD PRESSURE: 110 MMHG | WEIGHT: 156 LBS

## 2019-06-28 DIAGNOSIS — J20.9 ACUTE BRONCHITIS, UNSPECIFIED ORGANISM: Primary | ICD-10-CM

## 2019-06-28 PROCEDURE — G8419 CALC BMI OUT NRM PARAM NOF/U: HCPCS | Performed by: NURSE PRACTITIONER

## 2019-06-28 PROCEDURE — 1123F ACP DISCUSS/DSCN MKR DOCD: CPT | Performed by: NURSE PRACTITIONER

## 2019-06-28 PROCEDURE — 1036F TOBACCO NON-USER: CPT | Performed by: NURSE PRACTITIONER

## 2019-06-28 PROCEDURE — 1090F PRES/ABSN URINE INCON ASSESS: CPT | Performed by: NURSE PRACTITIONER

## 2019-06-28 PROCEDURE — G8399 PT W/DXA RESULTS DOCUMENT: HCPCS | Performed by: NURSE PRACTITIONER

## 2019-06-28 PROCEDURE — 4040F PNEUMOC VAC/ADMIN/RCVD: CPT | Performed by: NURSE PRACTITIONER

## 2019-06-28 PROCEDURE — G8427 DOCREV CUR MEDS BY ELIG CLIN: HCPCS | Performed by: NURSE PRACTITIONER

## 2019-06-28 PROCEDURE — 3017F COLORECTAL CA SCREEN DOC REV: CPT | Performed by: NURSE PRACTITIONER

## 2019-06-28 PROCEDURE — 99213 OFFICE O/P EST LOW 20 MIN: CPT | Performed by: NURSE PRACTITIONER

## 2019-06-28 RX ORDER — LORATADINE 10 MG/1
10 TABLET ORAL DAILY
Qty: 30 TABLET | Refills: 1 | Status: SHIPPED | OUTPATIENT
Start: 2019-06-28 | End: 2021-04-07

## 2019-06-28 RX ORDER — ALBUTEROL SULFATE 90 UG/1
2 AEROSOL, METERED RESPIRATORY (INHALATION) EVERY 6 HOURS PRN
Qty: 1 INHALER | Refills: 0 | Status: SHIPPED | OUTPATIENT
Start: 2019-06-28 | End: 2020-11-19 | Stop reason: SDUPTHER

## 2019-06-28 RX ORDER — METHYLPREDNISOLONE 4 MG/1
TABLET ORAL
Qty: 1 KIT | Refills: 0 | Status: SHIPPED | OUTPATIENT
Start: 2019-06-28 | End: 2019-07-04

## 2019-06-28 RX ORDER — AZITHROMYCIN 250 MG/1
TABLET, FILM COATED ORAL
Qty: 1 PACKET | Refills: 0 | Status: SHIPPED | OUTPATIENT
Start: 2019-06-28 | End: 2019-07-08

## 2019-06-28 RX ORDER — BENZONATATE 100 MG/1
100 CAPSULE ORAL 3 TIMES DAILY PRN
Qty: 30 CAPSULE | Refills: 0 | Status: SHIPPED | OUTPATIENT
Start: 2019-06-28 | End: 2019-07-08

## 2019-06-28 ASSESSMENT — ENCOUNTER SYMPTOMS
SORE THROAT: 1
NAUSEA: 0
SHORTNESS OF BREATH: 1
COUGH: 1
SINUS PRESSURE: 1
ABDOMINAL PAIN: 0
RHINORRHEA: 1

## 2019-06-28 NOTE — PROGRESS NOTES
States she wakes up at night feeling panicky. Denies fever body ache malaise or nv abd pain, denies any drainage from the ear. Pt is a remote smoker. Review of Systems   Constitutional: Positive for chills. Negative for fever. HENT: Positive for congestion, postnasal drip, rhinorrhea, sinus pressure and sore throat. Respiratory: Positive for cough and shortness of breath. Cardiovascular: Negative for chest pain. Gastrointestinal: Negative for abdominal pain and nausea. Neurological: Negative for dizziness and headaches. Objective:   Physical Exam   Constitutional: She appears well-developed and well-nourished. No distress. HENT:   Right Ear: External ear normal. No tenderness. Left Ear: External ear normal. No tenderness ( TM dulled ). Nose: Nose normal.   Mouth/Throat: Oropharynx is clear and moist.   Eyes: Conjunctivae and EOM are normal. No scleral icterus. Neck: Neck supple. No thyromegaly present. Cardiovascular: Normal rate, regular rhythm and normal heart sounds. Pulmonary/Chest: Effort normal. No respiratory distress. She has wheezes. Rales: scattered    Lymphadenopathy:     She has no cervical adenopathy. Nursing note and vitals reviewed. Assessment:      1.  Acute bronchitis, unspecified organism            Plan:      BP Readings from Last 3 Encounters:   06/28/19 110/68   03/20/19 118/78   09/26/18 108/80     /68 (Site: Left Upper Arm, Position: Sitting, Cuff Size: Medium Adult)   Pulse 56   Temp 97.6 °F (36.4 °C) (Oral)   Resp 14   Wt 156 lb (70.8 kg)   BMI 28.53 kg/m²   Lab Results   Component Value Date    WBC 6.1 01/16/2017    HGB 13.6 01/16/2017    HCT 41.0 01/16/2017     01/16/2017    CHOL 159 09/19/2018    TRIG 88 09/19/2018    HDL 58 09/19/2018    ALT 15 11/21/2015    AST 15 11/21/2015     (H) 09/19/2018    K 4.0 09/19/2018     09/19/2018    CREATININE 0.59 09/19/2018    BUN 17 09/19/2018    CO2 27 09/19/2018    TSH 3.67 06/17/2015     Lab Results   Component Value Date    CALCIUM 9.0 09/19/2018     Lab Results   Component Value Date    LDLCALC 75 06/17/2015    LDLCHOLESTEROL 83 09/19/2018         1. Acute bronchitis, unspecified organism  - albuterol sulfate HFA (PROAIR HFA) 108 (90 Base) MCG/ACT inhaler; Inhale 2 puffs into the lungs every 6 hours as needed for Wheezing  Dispense: 1 Inhaler; Refill: 0  - benzonatate (TESSALON PERLES) 100 MG capsule; Take 1 capsule by mouth 3 times daily as needed for Cough  Dispense: 30 capsule; Refill: 0  - methylPREDNISolone (MEDROL DOSEPACK) 4 MG tablet; Take by mouth. Dispense: 1 kit; Refill: 0  - loratadine (CLARITIN) 10 MG tablet; Take 1 tablet by mouth daily  Dispense: 30 tablet; Refill: 1  - azithromycin (ZITHROMAX) 250 MG tablet; Take 2 tabs on day 1, then 1 tab on days 2-5  Dispense: 1 packet; Refill: 0  - increase fluid and rest     Requested Prescriptions     Signed Prescriptions Disp Refills    albuterol sulfate HFA (PROAIR HFA) 108 (90 Base) MCG/ACT inhaler 1 Inhaler 0     Sig: Inhale 2 puffs into the lungs every 6 hours as needed for Wheezing    benzonatate (TESSALON PERLES) 100 MG capsule 30 capsule 0     Sig: Take 1 capsule by mouth 3 times daily as needed for Cough    methylPREDNISolone (MEDROL DOSEPACK) 4 MG tablet 1 kit 0     Sig: Take by mouth.  loratadine (CLARITIN) 10 MG tablet 30 tablet 1     Sig: Take 1 tablet by mouth daily    azithromycin (ZITHROMAX) 250 MG tablet 1 packet 0     Sig: Take 2 tabs on day 1, then 1 tab on days 2-5       There are no discontinued medications. Discussed use, benefit, and side effects of prescribed medications. Barriers to medication compliance addressed. All patient questions answered. Pt voiced understanding. No follow-ups on file.

## 2019-07-01 ENCOUNTER — OFFICE VISIT (OUTPATIENT)
Dept: OBGYN CLINIC | Age: 69
End: 2019-07-01
Payer: MEDICARE

## 2019-07-01 VITALS
HEIGHT: 62 IN | DIASTOLIC BLOOD PRESSURE: 68 MMHG | WEIGHT: 154 LBS | HEART RATE: 66 BPM | SYSTOLIC BLOOD PRESSURE: 122 MMHG | BODY MASS INDEX: 28.34 KG/M2

## 2019-07-01 DIAGNOSIS — Z12.31 ENCOUNTER FOR SCREENING MAMMOGRAM FOR MALIGNANT NEOPLASM OF BREAST: Primary | ICD-10-CM

## 2019-07-01 DIAGNOSIS — N90.89 LABIAL LESION: ICD-10-CM

## 2019-07-01 DIAGNOSIS — Z01.419 ENCOUNTER FOR WELL WOMAN EXAM WITH ROUTINE GYNECOLOGICAL EXAM: ICD-10-CM

## 2019-07-01 PROCEDURE — G0101 CA SCREEN;PELVIC/BREAST EXAM: HCPCS | Performed by: NURSE PRACTITIONER

## 2019-07-01 ASSESSMENT — ENCOUNTER SYMPTOMS
BACK PAIN: 0
SINUS PRESSURE: 1
CONSTIPATION: 0
DIARRHEA: 0
SINUS PAIN: 1
COUGH: 0
ABDOMINAL PAIN: 0
ABDOMINAL DISTENTION: 0
RHINORRHEA: 1
SHORTNESS OF BREATH: 0

## 2019-07-01 NOTE — PROGRESS NOTES
Negative for chest pain and palpitations. Gastrointestinal: Negative for abdominal distention, abdominal pain, constipation and diarrhea. Genitourinary: Negative for flank pain, frequency, menstrual problem, pelvic pain and vaginal discharge. Musculoskeletal: Negative for back pain. Neurological: Negative for syncope and headaches. Psychiatric/Behavioral: Negative for behavioral problems. Objective:     /68 (Site: Right Upper Arm, Position: Sitting, Cuff Size: Medium Adult)   Pulse 66   Ht 5' 2.01\" (1.575 m)   Wt 154 lb (69.9 kg)   BMI 28.16 kg/m²   Physical Exam   Constitutional: She is oriented to person, place, and time. She appears well-developed and well-nourished. Eyes: Pupils are equal, round, and reactive to light. Neck: No tracheal deviation present. No thyromegaly present. Cardiovascular: Normal rate, regular rhythm and normal heart sounds. Pulmonary/Chest: Effort normal and breath sounds normal. No respiratory distress. Right breast exhibits no inverted nipple. Left breast exhibits no inverted nipple, no mass, no nipple discharge, no skin change and no tenderness. No breast tenderness, discharge or bleeding. Breasts are symmetrical.   Abdominal: Soft. Bowel sounds are normal. She exhibits no distension and no mass. There is no tenderness. There is no CVA tenderness. Hernia confirmed negative in the right inguinal area and confirmed negative in the left inguinal area. Genitourinary:       No breast tenderness, discharge or bleeding. There is no rash or lesion on the right labia. There is no rash or lesion on the left labia. Uterus is not deviated, not enlarged and not fixed. Cervix exhibits no motion tenderness, no discharge and no friability. Right adnexum displays no mass, no tenderness and no fullness. Left adnexum displays no mass, no tenderness and no fullness. No tenderness in the vagina. No vaginal discharge found.    Musculoskeletal: She exhibits no edema or

## 2019-08-07 ENCOUNTER — HOSPITAL ENCOUNTER (OUTPATIENT)
Dept: MAMMOGRAPHY | Age: 69
Discharge: HOME | End: 2019-08-09
Payer: MEDICARE

## 2019-08-07 DIAGNOSIS — Z12.31 ENCOUNTER FOR SCREENING MAMMOGRAM FOR MALIGNANT NEOPLASM OF BREAST: ICD-10-CM

## 2019-08-07 DIAGNOSIS — Z78.0 POSTMENOPAUSE: ICD-10-CM

## 2019-08-07 PROCEDURE — 77080 DXA BONE DENSITY AXIAL: CPT

## 2019-08-07 PROCEDURE — 77063 BREAST TOMOSYNTHESIS BI: CPT

## 2019-09-18 ENCOUNTER — OFFICE VISIT (OUTPATIENT)
Dept: FAMILY MEDICINE CLINIC | Age: 69
End: 2019-09-18
Payer: MEDICARE

## 2019-09-18 VITALS
TEMPERATURE: 97.3 F | HEIGHT: 62 IN | HEART RATE: 56 BPM | WEIGHT: 155 LBS | BODY MASS INDEX: 28.52 KG/M2 | SYSTOLIC BLOOD PRESSURE: 118 MMHG | DIASTOLIC BLOOD PRESSURE: 68 MMHG

## 2019-09-18 DIAGNOSIS — Z00.00 MEDICARE ANNUAL WELLNESS VISIT, INITIAL: Primary | ICD-10-CM

## 2019-09-18 DIAGNOSIS — Z00.00 ROUTINE GENERAL MEDICAL EXAMINATION AT A HEALTH CARE FACILITY: ICD-10-CM

## 2019-09-18 PROCEDURE — 1123F ACP DISCUSS/DSCN MKR DOCD: CPT | Performed by: NURSE PRACTITIONER

## 2019-09-18 PROCEDURE — 3017F COLORECTAL CA SCREEN DOC REV: CPT | Performed by: NURSE PRACTITIONER

## 2019-09-18 PROCEDURE — G0438 PPPS, INITIAL VISIT: HCPCS | Performed by: NURSE PRACTITIONER

## 2019-09-18 PROCEDURE — 4040F PNEUMOC VAC/ADMIN/RCVD: CPT | Performed by: NURSE PRACTITIONER

## 2019-09-18 ASSESSMENT — LIFESTYLE VARIABLES
HOW MANY STANDARD DRINKS CONTAINING ALCOHOL DO YOU HAVE ON A TYPICAL DAY: 0
HOW OFTEN DO YOU HAVE A DRINK CONTAINING ALCOHOL: 2
AUDIT-C TOTAL SCORE: 2
HOW OFTEN DO YOU HAVE SIX OR MORE DRINKS ON ONE OCCASION: 0

## 2019-09-18 ASSESSMENT — PATIENT HEALTH QUESTIONNAIRE - PHQ9
SUM OF ALL RESPONSES TO PHQ QUESTIONS 1-9: 0
SUM OF ALL RESPONSES TO PHQ QUESTIONS 1-9: 0

## 2019-09-18 NOTE — PROGRESS NOTES
triamcinolone (KENALOG) 0.1 % cream Apply topically 2 times daily. Yes DREW Sagastume - CNP   Cyanocobalamin (VITAMIN B-12 CR PO) Take by mouth Yes Historical Provider, MD   Coenzyme Q10 (COQ10 PO) Take by mouth Yes Historical Provider, MD   pantoprazole (PROTONIX) 40 MG tablet Take 1 tablet by mouth daily Yes Ashley Paige MD   Multiple Vitamins-Minerals (MULTIVITAMIN PO) Take by mouth Yes Historical Provider, MD   vitamin D (ERGOCALCIFEROL) 400 UNITS CAPS Take 400 Units by mouth daily Yes Historical Provider, MD   Calcium Carbonate-Vitamin D (CALCIUM + D) 600-200 MG-UNIT TABS Take  by mouth. Yes Historical Provider, MD     Past Medical History:   Diagnosis Date    Anxiety     Diverticulosis of colon     DJD (degenerative joint disease) 1/7/2015    Family history of colon cancer     Generalized OA 1/7/2015    GERD (gastroesophageal reflux disease)     Hyperlipidemia 1/7/2015    Irritable bowel syndrome     Osteoarthritis      Past Surgical History:   Procedure Laterality Date    CHOLECYSTECTOMY      COLONOSCOPY  2004    normal    COLONOSCOPY  4/25/14    diverticulosis    COLONOSCOPY N/A 2/9/2018    COLONOSCOPY POLYPECTOMY COLD BIOPSY performed by Ashley Paige MD at [facility]  02/09/2018    Spasms in the sigmoid colon. No colitis or ileitis seen.     DILATION AND CURETTAGE OF UTERUS  1970    DILATION AND CURETTAGE OF UTERUS  1991    ENDOSCOPY, COLON, DIAGNOSTIC      JOINT REPLACEMENT Right 8/26/2013    hip    TONSILLECTOMY  1954    UPPER GASTROINTESTINAL ENDOSCOPY  2004    normal    UPPER GASTROINTESTINAL ENDOSCOPY  4/25/14    esophagitis with bx     Family History   Problem Relation Age of Onset    Other Mother         had many stomach surgeries    COPD Father     Cancer Father         colon    Other Brother         sepsis    Cancer Maternal Grandfather         colon       CareTeam (Including outside providers/suppliers regularly involved in providing care): Patient Care Team:  Ashleigh Lemus MD as PCP - General  Marquez Garcia, DREW - CNP as PCP - [facility] Empaneled Provider  Durga Valdez MD as Consulting Physician (Gastroenterology)  Criss Almanzar MD as Consulting Physician (Rheumatology)  Sahil Lee MD (Dermatology)  Reynaldo Knight MD as Consulting Physician (Allergy & Immunology)    Wt Readings from Last 3 Encounters:   09/18/19 155 lb (70.3 kg)   07/01/19 154 lb (69.9 kg)   06/28/19 156 lb (70.8 kg)     Vitals:    09/18/19 1328   BP: 118/68   Site: Left Upper Arm   Position: Sitting   Cuff Size: Medium Adult   Pulse: 56   Temp: 97.3 °F (36.3 °C)   TempSrc: Oral   Weight: 155 lb (70.3 kg)   Height: 5' 2\" (1.575 m)     Body mass index is 28.35 kg/m². Based upon direct observation of the patient, evaluation of cognition reveals . Patient's complete Health Risk Assessment and screening values have been reviewed and are found in Flowsheets. The following problems were reviewed today and where indicated follow up appointments were made and/or referrals ordered. Positive Risk Factor Screenings with Interventions:     General Health:  General  In general, how would you say your health is?: Very Good  In the past 7 days, have you experienced any of the following?  New or Increased Pain, New or Increased Fatigue, Loneliness, Social Isolation, Stress or Anger?: (!) Stress, Anger  Do you get the social and emotional support that you need?: (!) No  Do you have a Living Will?: Yes  General Health Risk Interventions:  ·     Safety:  Safety  Do you have working smoke detectors?: Yes  Have all throw rugs been removed or fastened?: (!) No  Do you have non-slip mats or surfaces in all bathtubs/showers?: (!) No  Do all of your stairways have a railing or banister?: Yes  Are your doorways, halls and stairs free of clutter?: Yes  Do you always fasten your seatbelt when you are in a car?: Yes  Safety Interventions:  ·     Personalized Preventive Plan Current Health Maintenance Status  Immunization History   Administered Date(s) Administered    Influenza Virus Vaccine 10/28/2003, 10/30/2007, 09/16/2009, 11/05/2010, 09/27/2011, 09/24/2012    Influenza, High Dose (Fluzone 65 yrs and older) 09/26/2018    Influenza, Quadv, IM, PF (6 mo and older Fluzone, Flulaval, Fluarix, and 3 yrs and older Afluria) 09/27/2017    Pneumococcal Conjugate 13-valent ([id number]) 01/16/2017    Pneumococcal Polysaccharide (Dchnjrdvc33) 08/25/2009, 12/23/2015    Zoster Live (Zostavax) 04/10/2012        Health Maintenance   Topic Date Due    DTaP/Tdap/Td vaccine (1 - Tdap) 07/23/1970    Shingles Vaccine (2 of 3) 06/05/2012    Annual Wellness Visit (AWV)  05/29/2019    Flu vaccine (1) 09/01/2019    Pneumococcal 65+ years Vaccine (2 of 2 - PPSV23) 12/23/2020    Breast cancer screen  08/07/2021    Lipid screen  09/19/2023    Colon cancer screen colonoscopy  02/09/2028    DEXA (modify frequency per FRAX score)  Completed    Hepatitis C screen  Completed     Recommendations for Preventive Services Due: see orders and patient instructions/AVS.  . Recommended screening schedule for the next 5-10 years is provided to the patient in written form: see Patient Evangelina Jarquin was seen today for medicare awv.     Diagnoses and all orders for this visit:    Medicare annual wellness visit, initial    Routine general medical examination at a health care facility

## 2020-01-03 ENCOUNTER — OFFICE VISIT (OUTPATIENT)
Dept: FAMILY MEDICINE CLINIC | Age: 70
End: 2020-01-03
Payer: MEDICARE

## 2020-01-03 VITALS
BODY MASS INDEX: 28.17 KG/M2 | SYSTOLIC BLOOD PRESSURE: 120 MMHG | DIASTOLIC BLOOD PRESSURE: 60 MMHG | TEMPERATURE: 97.7 F | WEIGHT: 154 LBS | HEART RATE: 56 BPM | RESPIRATION RATE: 16 BRPM

## 2020-01-03 PROCEDURE — G8399 PT W/DXA RESULTS DOCUMENT: HCPCS | Performed by: NURSE PRACTITIONER

## 2020-01-03 PROCEDURE — G8417 CALC BMI ABV UP PARAM F/U: HCPCS | Performed by: NURSE PRACTITIONER

## 2020-01-03 PROCEDURE — 1123F ACP DISCUSS/DSCN MKR DOCD: CPT | Performed by: NURSE PRACTITIONER

## 2020-01-03 PROCEDURE — 1036F TOBACCO NON-USER: CPT | Performed by: NURSE PRACTITIONER

## 2020-01-03 PROCEDURE — 4040F PNEUMOC VAC/ADMIN/RCVD: CPT | Performed by: NURSE PRACTITIONER

## 2020-01-03 PROCEDURE — 99213 OFFICE O/P EST LOW 20 MIN: CPT | Performed by: NURSE PRACTITIONER

## 2020-01-03 PROCEDURE — 1090F PRES/ABSN URINE INCON ASSESS: CPT | Performed by: NURSE PRACTITIONER

## 2020-01-03 PROCEDURE — 3017F COLORECTAL CA SCREEN DOC REV: CPT | Performed by: NURSE PRACTITIONER

## 2020-01-03 PROCEDURE — G8484 FLU IMMUNIZE NO ADMIN: HCPCS | Performed by: NURSE PRACTITIONER

## 2020-01-03 PROCEDURE — G8427 DOCREV CUR MEDS BY ELIG CLIN: HCPCS | Performed by: NURSE PRACTITIONER

## 2020-01-03 RX ORDER — GUAIFENESIN AND DEXTROMETHORPHAN HYDROBROMIDE 600; 30 MG/1; MG/1
TABLET, EXTENDED RELEASE ORAL
Qty: 28 TABLET | Refills: 0 | Status: SHIPPED | OUTPATIENT
Start: 2020-01-03 | End: 2020-07-20 | Stop reason: ALTCHOICE

## 2020-01-03 RX ORDER — AZITHROMYCIN 250 MG/1
TABLET, FILM COATED ORAL
Qty: 1 PACKET | Refills: 0 | Status: SHIPPED | OUTPATIENT
Start: 2020-01-03 | End: 2020-01-13

## 2020-01-03 ASSESSMENT — ENCOUNTER SYMPTOMS
SORE THROAT: 1
NAUSEA: 0
RHINORRHEA: 1
SHORTNESS OF BREATH: 0
COUGH: 1
ABDOMINAL PAIN: 0
SINUS PRESSURE: 1

## 2020-01-03 NOTE — PROGRESS NOTES
Subjective:      Patient ID: Theresa Payan is a 77 y.o. female. Visit Information    Have you changed or started any medications since your last visit including any over-the-counter medicines, vitamins, or herbal medicines? no   Are you having any side effects from any of your medications? -  no  Have you stopped taking any of your medications? Is so, why? -  no    Have you seen any other physician or provider since your last visit? No  Have you had any other diagnostic tests since your last visit? Yes - Records Obtained  Have you been seen in the emergency room and/or had an admission to a hospital since we last saw you? No  Have you had your routine dental cleaning in the past 6 months? no    Have you activated your [employer] account? If not, what are your barriers? Yes     Patient Care Team:  Justin Luu MD as PCP - General  Teresa Pineda MD as Consulting Physician (Gastroenterology)  Rea Shaffer MD as Consulting Physician (Rheumatology)  Mckenna Martinez MD (Dermatology)  Alyse Harris MD as Consulting Physician (Allergy & Immunology)    Medical History Review  Past Medical, Family, and Social History reviewed and does not contribute to the patient presenting condition    Health Maintenance   Topic Date Due    DTaP/Tdap/Td vaccine (1 - Tdap) 07/23/1962    Shingles Vaccine (2 of 3) 06/05/2012    Flu vaccine (1) 09/01/2019    Annual Wellness Visit (AWV)  09/17/2020    Pneumococcal 65+ years Vaccine (2 of 2 - PPSV23) 12/23/2020    Breast cancer screen  08/07/2021    Lipid screen  09/19/2023    Colon cancer screen colonoscopy  02/09/2028    DEXA (modify frequency per FRAX score)  Completed    Hepatitis C screen  Completed     HPI    77year old female presents with chills sore throat  nasal congestion/pressure post nasal drainage cough for 1 week. She says cough is productive with thick yellow sputum. She denies fever sob body ache malaise or nv abd pain.      Review of Systems Constitutional: Positive for chills. Negative for fever. HENT: Positive for congestion, postnasal drip, rhinorrhea, sinus pressure and sore throat. Respiratory: Positive for cough. Negative for shortness of breath. Cardiovascular: Negative for chest pain. Gastrointestinal: Negative for abdominal pain and nausea. Neurological: Negative for dizziness and headaches. Objective:   Physical Exam  Vitals signs and nursing note reviewed. Constitutional:       General: She is not in acute distress. Appearance: She is well-developed. HENT:      Head: Normocephalic. Right Ear: External ear normal.      Left Ear: External ear normal.      Nose: Nose normal.   Eyes:      General: No scleral icterus. Conjunctiva/sclera: Conjunctivae normal.   Neck:      Musculoskeletal: Neck supple. Thyroid: No thyromegaly. Cardiovascular:      Rate and Rhythm: Normal rate and regular rhythm. Heart sounds: Normal heart sounds. Pulmonary:      Effort: Pulmonary effort is normal. No respiratory distress. Breath sounds: Normal breath sounds. Lymphadenopathy:      Cervical: No cervical adenopathy. Assessment:      1.  Acute bronchitis, unspecified organism            Plan:      BP Readings from Last 3 Encounters:   01/03/20 120/60   09/18/19 118/68   07/01/19 122/68     /60 (Site: Left Upper Arm, Position: Sitting, Cuff Size: Medium Adult)   Pulse 56   Temp 97.7 °F (36.5 °C) (Oral)   Resp 16   Wt 154 lb (69.9 kg)   BMI 28.17 kg/m²   Lab Results   Component Value Date    WBC 6.1 01/16/2017    HGB 13.6 01/16/2017    HCT 41.0 01/16/2017     01/16/2017    CHOL 159 09/19/2018    TRIG 88 09/19/2018    HDL 58 09/19/2018    ALT 15 11/21/2015    AST 15 11/21/2015     (H) 09/19/2018    K 4.0 09/19/2018     09/19/2018    CREATININE 0.59 09/19/2018    BUN 17 09/19/2018    CO2 27 09/19/2018    TSH 3.67 06/17/2015     Lab Results   Component Value Date    CALCIUM 9.0

## 2020-02-04 ENCOUNTER — OFFICE VISIT (OUTPATIENT)
Dept: GASTROENTEROLOGY | Age: 70
End: 2020-02-04
Payer: MEDICARE

## 2020-02-04 VITALS
HEIGHT: 63 IN | TEMPERATURE: 97.2 F | OXYGEN SATURATION: 95 % | WEIGHT: 154 LBS | BODY MASS INDEX: 27.29 KG/M2 | HEART RATE: 56 BPM | SYSTOLIC BLOOD PRESSURE: 132 MMHG | DIASTOLIC BLOOD PRESSURE: 79 MMHG

## 2020-02-04 PROCEDURE — 1036F TOBACCO NON-USER: CPT | Performed by: INTERNAL MEDICINE

## 2020-02-04 PROCEDURE — 4040F PNEUMOC VAC/ADMIN/RCVD: CPT | Performed by: INTERNAL MEDICINE

## 2020-02-04 PROCEDURE — 1090F PRES/ABSN URINE INCON ASSESS: CPT | Performed by: INTERNAL MEDICINE

## 2020-02-04 PROCEDURE — G8417 CALC BMI ABV UP PARAM F/U: HCPCS | Performed by: INTERNAL MEDICINE

## 2020-02-04 PROCEDURE — 99214 OFFICE O/P EST MOD 30 MIN: CPT | Performed by: INTERNAL MEDICINE

## 2020-02-04 PROCEDURE — G8399 PT W/DXA RESULTS DOCUMENT: HCPCS | Performed by: INTERNAL MEDICINE

## 2020-02-04 PROCEDURE — 1123F ACP DISCUSS/DSCN MKR DOCD: CPT | Performed by: INTERNAL MEDICINE

## 2020-02-04 PROCEDURE — G8484 FLU IMMUNIZE NO ADMIN: HCPCS | Performed by: INTERNAL MEDICINE

## 2020-02-04 PROCEDURE — G8427 DOCREV CUR MEDS BY ELIG CLIN: HCPCS | Performed by: INTERNAL MEDICINE

## 2020-02-04 PROCEDURE — 3017F COLORECTAL CA SCREEN DOC REV: CPT | Performed by: INTERNAL MEDICINE

## 2020-02-04 ASSESSMENT — ENCOUNTER SYMPTOMS
PHOTOPHOBIA: 0
GASTROINTESTINAL NEGATIVE: 1
EYE REDNESS: 0
EYE PAIN: 0
RESPIRATORY NEGATIVE: 1
EYE ITCHING: 0
EYE DISCHARGE: 0

## 2020-02-04 NOTE — PROGRESS NOTES
problems, confusion, decreased concentration, dysphoric mood, hallucinations, self-injury and suicidal ideas. The patient is not nervous/anxious and is not hyperactive. Objective:   Physical Exam  Vitals signs and nursing note reviewed. Constitutional:       Appearance: She is well-developed. HENT:      Head: Normocephalic and atraumatic. Eyes:      General: No scleral icterus. Conjunctiva/sclera: Conjunctivae normal.      Pupils: Pupils are equal, round, and reactive to light. Neck:      Musculoskeletal: Normal range of motion and neck supple. Thyroid: No thyromegaly. Vascular: No hepatojugular reflux or JVD. Trachea: No tracheal deviation. Cardiovascular:      Rate and Rhythm: Normal rate and regular rhythm. Heart sounds: Normal heart sounds. Pulmonary:      Effort: Pulmonary effort is normal. No respiratory distress. Breath sounds: Normal breath sounds. No wheezing or rales. Abdominal:      General: Bowel sounds are normal. There is no distension. Palpations: Abdomen is soft. There is no hepatomegaly or mass. Tenderness: There is no abdominal tenderness. There is no rebound. Hernia: No hernia is present. Musculoskeletal:         General: No tenderness. Comments: No joint swelling   Lymphadenopathy:      Cervical: No cervical adenopathy. Skin:     General: Skin is warm. Findings: No bruising, ecchymosis, erythema or rash. Neurological:      Mental Status: She is alert and oriented to person, place, and time. Cranial Nerves: No cranial nerve deficit. Psychiatric:         Thought Content: Thought content normal.         Assessment:       Diagnosis Orders   1. Irritable bowel syndrome, unspecified type     2. Scleroderma ([address]     3. Chronic GERD             Plan: At present patient appears stable. She had a colonoscopy done 2 years ago and at that time no abnormalities noted.     She is known to have significant spasms in

## 2020-02-06 RX ORDER — PANTOPRAZOLE SODIUM 40 MG/1
40 TABLET, DELAYED RELEASE ORAL DAILY
Qty: 90 TABLET | Refills: 3 | Status: SHIPPED | OUTPATIENT
Start: 2020-02-06 | End: 2021-09-13 | Stop reason: SDUPTHER

## 2020-03-16 ENCOUNTER — TELEPHONE (OUTPATIENT)
Dept: FAMILY MEDICINE CLINIC | Age: 70
End: 2020-03-16

## 2020-03-16 RX ORDER — BENZONATATE 100 MG/1
100 CAPSULE ORAL 3 TIMES DAILY PRN
Qty: 30 CAPSULE | Refills: 0 | Status: SHIPPED | OUTPATIENT
Start: 2020-03-16 | End: 2020-03-23

## 2020-03-16 RX ORDER — AMOXICILLIN 875 MG/1
875 TABLET, COATED ORAL 2 TIMES DAILY
Qty: 20 TABLET | Refills: 0 | Status: SHIPPED | OUTPATIENT
Start: 2020-03-16 | End: 2020-03-26

## 2020-07-20 ENCOUNTER — OFFICE VISIT (OUTPATIENT)
Dept: FAMILY MEDICINE CLINIC | Age: 70
End: 2020-07-20
Payer: MEDICARE

## 2020-07-20 VITALS
TEMPERATURE: 98.1 F | WEIGHT: 152.2 LBS | SYSTOLIC BLOOD PRESSURE: 130 MMHG | BODY MASS INDEX: 27.39 KG/M2 | DIASTOLIC BLOOD PRESSURE: 80 MMHG | RESPIRATION RATE: 16 BRPM | HEART RATE: 58 BPM

## 2020-07-20 PROCEDURE — 4040F PNEUMOC VAC/ADMIN/RCVD: CPT | Performed by: NURSE PRACTITIONER

## 2020-07-20 PROCEDURE — G8417 CALC BMI ABV UP PARAM F/U: HCPCS | Performed by: NURSE PRACTITIONER

## 2020-07-20 PROCEDURE — 3017F COLORECTAL CA SCREEN DOC REV: CPT | Performed by: NURSE PRACTITIONER

## 2020-07-20 PROCEDURE — G8399 PT W/DXA RESULTS DOCUMENT: HCPCS | Performed by: NURSE PRACTITIONER

## 2020-07-20 PROCEDURE — G8510 SCR DEP NEG, NO PLAN REQD: HCPCS | Performed by: NURSE PRACTITIONER

## 2020-07-20 PROCEDURE — 1036F TOBACCO NON-USER: CPT | Performed by: NURSE PRACTITIONER

## 2020-07-20 PROCEDURE — 99214 OFFICE O/P EST MOD 30 MIN: CPT | Performed by: NURSE PRACTITIONER

## 2020-07-20 PROCEDURE — 1090F PRES/ABSN URINE INCON ASSESS: CPT | Performed by: NURSE PRACTITIONER

## 2020-07-20 PROCEDURE — 1123F ACP DISCUSS/DSCN MKR DOCD: CPT | Performed by: NURSE PRACTITIONER

## 2020-07-20 PROCEDURE — G8427 DOCREV CUR MEDS BY ELIG CLIN: HCPCS | Performed by: NURSE PRACTITIONER

## 2020-07-20 SDOH — ECONOMIC STABILITY: FOOD INSECURITY: WITHIN THE PAST 12 MONTHS, THE FOOD YOU BOUGHT JUST DIDN'T LAST AND YOU DIDN'T HAVE MONEY TO GET MORE.: NEVER TRUE

## 2020-07-20 SDOH — ECONOMIC STABILITY: INCOME INSECURITY: HOW HARD IS IT FOR YOU TO PAY FOR THE VERY BASICS LIKE FOOD, HOUSING, MEDICAL CARE, AND HEATING?: NOT HARD AT ALL

## 2020-07-20 SDOH — ECONOMIC STABILITY: FOOD INSECURITY: WITHIN THE PAST 12 MONTHS, YOU WORRIED THAT YOUR FOOD WOULD RUN OUT BEFORE YOU GOT MONEY TO BUY MORE.: NEVER TRUE

## 2020-07-20 ASSESSMENT — PATIENT HEALTH QUESTIONNAIRE - PHQ9
SUM OF ALL RESPONSES TO PHQ9 QUESTIONS 1 & 2: 0
SUM OF ALL RESPONSES TO PHQ QUESTIONS 1-9: 0
2. FEELING DOWN, DEPRESSED OR HOPELESS: 0
SUM OF ALL RESPONSES TO PHQ QUESTIONS 1-9: 0
1. LITTLE INTEREST OR PLEASURE IN DOING THINGS: 0

## 2020-07-20 ASSESSMENT — ENCOUNTER SYMPTOMS
ABDOMINAL PAIN: 0
SHORTNESS OF BREATH: 0
VOMITING: 0
NAUSEA: 0
WHEEZING: 0

## 2020-07-20 NOTE — PROGRESS NOTES
Subjective:      Patient ID: Mak Hoover is a 77 y.o. female. HPI  Visit Information    Have you changed or started any medications since your last visit including any over-the-counter medicines, vitamins, or herbal medicines? no   Have you stopped taking any of your medications? Is so, why? -  no  Are you having any side effects from any of your medications? - no    Have you seen any other physician or provider since your last visit? yes - DR. LEMOS    Have you had any other diagnostic tests since your last visit?  no   Have you been seen in the emergency room and/or had an admission in a hospital since we last saw you?  no   Have you had your routine dental cleaning in the past 6 months?  yes -      Do you have an active MyChart account? If no, what is the barrier?   Yes    Patient Care Team:  DREW Ramirez CNP as PCP - General (Family Nurse Practitioner)  DREW Ramirez CNP as PCP - [facility]  Eleuterio Blake MD as Consulting Physician (Gastroenterology)  Gabe Rm MD as Consulting Physician (Rheumatology)  Lolita Mckeon MD (Dermatology)  Nav Cooper MD as Consulting Physician (Allergy & Immunology)    Medical History Review  Past Medical, Family, and Social History reviewed and does contribute to the patient presenting condition    Health Maintenance   Topic Date Due    DTaP/Tdap/Td vaccine (1 - Tdap) 07/23/1970    Flu vaccine (1) 09/01/2020    Annual Wellness Visit (AWV)  09/18/2020    Pneumococcal 65+ years Vaccine (2 of 2 - PPSV23) 12/23/2020    Breast cancer screen  08/07/2021    Lipid screen  09/19/2023    Colon cancer screen colonoscopy  02/09/2028    DEXA (modify frequency per FRAX score)  Completed    Shingles Vaccine  Completed    Hepatitis C screen  Completed    Hepatitis A vaccine  Aged Out    Hepatitis B vaccine  Aged Out    Hib vaccine  Aged Out    Meningococcal (ACWY) vaccine  Aged Out       80year old female presents with management of HTN, hyperlipidemia and GERD with medication refills. Has been on diet measures and states she watches diet and stays active. bp is stable. Currently is on PPI therapy for acid reflux and states it works very well. denies vision changes or palpitations sob or cp,  denies numbness tingling or weakness. Hx of scleroderma and follows up with Dr. Uziel Rios. Review of Systems   Constitutional: Negative for chills and fever. Eyes: Negative for visual disturbance. Respiratory: Negative for shortness of breath and wheezing. Cardiovascular: Negative for chest pain and leg swelling. Gastrointestinal: Negative for abdominal pain, nausea and vomiting. Neurological: Negative for dizziness, weakness and numbness. Psychiatric/Behavioral: Negative for agitation and behavioral problems. Objective:   Physical Exam  Vitals signs and nursing note reviewed. Constitutional:       General: She is not in acute distress. Appearance: Normal appearance. HENT:      Nose: Nose normal. No congestion. Eyes:      General: No scleral icterus. Conjunctiva/sclera: Conjunctivae normal.   Neck:      Musculoskeletal: Normal range of motion and neck supple. Cardiovascular:      Rate and Rhythm: Normal rate and regular rhythm. Pulses: Normal pulses. Heart sounds: Normal heart sounds. Pulmonary:      Effort: Pulmonary effort is normal. No respiratory distress. Breath sounds: Normal breath sounds. Abdominal:      Palpations: Abdomen is soft. Tenderness: There is no abdominal tenderness. Musculoskeletal: Normal range of motion. General: No tenderness. Skin:     General: Skin is warm and dry. Neurological:      Mental Status: She is alert and oriented to person, place, and time. Cranial Nerves: No cranial nerve deficit. Psychiatric:         Mood and Affect: Mood normal.         Behavior: Behavior normal.         Assessment:      1. Essential hypertension    2. Mixed hyperlipidemia    3. Gastroesophageal reflux disease without esophagitis            Plan:      BP Readings from Last 3 Encounters:   07/20/20 130/80   02/04/20 132/79   01/03/20 120/60     /80 (Site: Left Upper Arm, Position: Sitting, Cuff Size: Medium Adult)   Pulse 58   Temp 98.1 °F (36.7 °C) (Infrared)   Resp 16   Wt 152 lb 3.2 oz (69 kg)   BMI 27.39 kg/m²   Lab Results   Component Value Date    WBC 6.1 01/16/2017    HGB 13.6 01/16/2017    HCT 41.0 01/16/2017     01/16/2017    CHOL 159 09/19/2018    TRIG 88 09/19/2018    HDL 58 09/19/2018    ALT 15 11/21/2015    AST 15 11/21/2015     (H) 09/19/2018    K 4.0 09/19/2018     09/19/2018    CREATININE 0.59 09/19/2018    BUN 17 09/19/2018    CO2 27 09/19/2018    TSH 3.67 06/17/2015     Lab Results   Component Value Date    CALCIUM 9.0 09/19/2018     Lab Results   Component Value Date    LDLCALC 75 06/17/2015    LDLCHOLESTEROL 83 09/19/2018         1. Essential hypertension  - stable. Cont  To monitor .  - Basic Metabolic Panel; Future    2. Mixed hyperlipidemia  - cont diet measures. - Diet, exercise and weight reduction discussed. - Lipid Panel; Future    3. Gastroesophageal reflux disease without esophagitis  - stable. Cont PPI therapy   - Magnesium; Future    Requested Prescriptions      No prescriptions requested or ordered in this encounter       Medications Discontinued During This Encounter   Medication Reason    Dextromethorphan-guaiFENesin (MUCINEX DM)  MG TB12 Therapy completed    triamcinolone (KENALOG) 0.1 % cream Therapy completed     Discussed use, benefit, and side effects of prescribed medications. Barriers to medication compliance addressed. All patient questions answered. Pt voiced understanding. No follow-ups on file.

## 2020-08-19 ENCOUNTER — HOSPITAL ENCOUNTER (OUTPATIENT)
Dept: MAMMOGRAPHY | Age: 70
Discharge: HOME | End: 2020-08-21
Payer: MEDICARE

## 2020-08-19 PROCEDURE — 77063 BREAST TOMOSYNTHESIS BI: CPT

## 2020-09-10 ENCOUNTER — TELEPHONE (OUTPATIENT)
Dept: FAMILY MEDICINE CLINIC | Age: 70
End: 2020-09-10

## 2020-09-10 NOTE — TELEPHONE ENCOUNTER
MANOJ:  Patient called stating she has sore throat, coughing, NO fever, sob,loss of taste/smell, travel or exposure. It's been a few days. I told her to contact Urgent Care, she will call the one by her home.

## 2020-09-16 ENCOUNTER — HOSPITAL ENCOUNTER (OUTPATIENT)
Age: 70
Setting detail: SPECIMEN
Discharge: HOME | End: 2020-09-16
Payer: MEDICARE

## 2020-09-16 LAB
ANION GAP SERPL CALCULATED.3IONS-SCNC: 14 MMOL/L (ref 9–17)
BUN BLDV-MCNC: 21 MG/DL (ref 8–23)
BUN/CREAT BLD: ABNORMAL (ref 9–20)
CALCIUM SERPL-MCNC: 9.2 MG/DL (ref 8.6–10.4)
CHLORIDE BLD-SCNC: 107 MMOL/L (ref 98–107)
CHOLESTEROL/HDL RATIO: 3.1
CHOLESTEROL: 173 MG/DL
CO2: 23 MMOL/L (ref 20–31)
CREAT SERPL-MCNC: 0.54 MG/DL (ref 0.5–0.9)
GFR AFRICAN AMERICAN: >60 ML/MIN
GFR NON-AFRICAN AMERICAN: >60 ML/MIN
GFR SERPL CREATININE-BSD FRML MDRD: ABNORMAL ML/MIN/{1.73_M2}
GFR SERPL CREATININE-BSD FRML MDRD: ABNORMAL ML/MIN/{1.73_M2}
GLUCOSE BLD-MCNC: 101 MG/DL (ref 70–99)
HDLC SERPL-MCNC: 56 MG/DL
LDL CHOLESTEROL: 98 MG/DL (ref 0–130)
MAGNESIUM: 2.4 MG/DL (ref 1.6–2.6)
POTASSIUM SERPL-SCNC: 4.8 MMOL/L (ref 3.7–5.3)
SODIUM BLD-SCNC: 144 MMOL/L (ref 135–144)
TRIGL SERPL-MCNC: 94 MG/DL
VLDLC SERPL CALC-MCNC: NORMAL MG/DL (ref 1–30)

## 2020-09-23 ENCOUNTER — OFFICE VISIT (OUTPATIENT)
Dept: FAMILY MEDICINE CLINIC | Age: 70
End: 2020-09-23
Payer: MEDICARE

## 2020-09-23 VITALS
RESPIRATION RATE: 16 BRPM | HEIGHT: 63 IN | DIASTOLIC BLOOD PRESSURE: 74 MMHG | TEMPERATURE: 97.7 F | WEIGHT: 158.2 LBS | BODY MASS INDEX: 28.03 KG/M2 | SYSTOLIC BLOOD PRESSURE: 120 MMHG | HEART RATE: 66 BPM

## 2020-09-23 PROCEDURE — G0439 PPPS, SUBSEQ VISIT: HCPCS | Performed by: NURSE PRACTITIONER

## 2020-09-23 PROCEDURE — G0008 ADMIN INFLUENZA VIRUS VAC: HCPCS | Performed by: NURSE PRACTITIONER

## 2020-09-23 PROCEDURE — 3017F COLORECTAL CA SCREEN DOC REV: CPT | Performed by: NURSE PRACTITIONER

## 2020-09-23 PROCEDURE — 1123F ACP DISCUSS/DSCN MKR DOCD: CPT | Performed by: NURSE PRACTITIONER

## 2020-09-23 PROCEDURE — 90694 VACC AIIV4 NO PRSRV 0.5ML IM: CPT | Performed by: NURSE PRACTITIONER

## 2020-09-23 PROCEDURE — 4040F PNEUMOC VAC/ADMIN/RCVD: CPT | Performed by: NURSE PRACTITIONER

## 2020-09-23 RX ORDER — ASCORBIC ACID 500 MG
500 TABLET ORAL DAILY
COMMUNITY

## 2020-09-23 ASSESSMENT — LIFESTYLE VARIABLES
HOW MANY STANDARD DRINKS CONTAINING ALCOHOL DO YOU HAVE ON A TYPICAL DAY: 0
HOW OFTEN DURING THE LAST YEAR HAVE YOU FAILED TO DO WHAT WAS NORMALLY EXPECTED FROM YOU BECAUSE OF DRINKING: 0
HOW OFTEN DO YOU HAVE A DRINK CONTAINING ALCOHOL: 1
HOW OFTEN DURING THE LAST YEAR HAVE YOU BEEN UNABLE TO REMEMBER WHAT HAPPENED THE NIGHT BEFORE BECAUSE YOU HAD BEEN DRINKING: 0
HOW OFTEN DURING THE LAST YEAR HAVE YOU NEEDED AN ALCOHOLIC DRINK FIRST THING IN THE MORNING TO GET YOURSELF GOING AFTER A NIGHT OF HEAVY DRINKING: 0
HOW OFTEN DURING THE LAST YEAR HAVE YOU FOUND THAT YOU WERE NOT ABLE TO STOP DRINKING ONCE YOU HAD STARTED: 0
HOW OFTEN DO YOU HAVE SIX OR MORE DRINKS ON ONE OCCASION: 1
HAVE YOU OR SOMEONE ELSE BEEN INJURED AS A RESULT OF YOUR DRINKING: 0
AUDIT TOTAL SCORE: 2
AUDIT-C TOTAL SCORE: 2
HOW OFTEN DURING THE LAST YEAR HAVE YOU HAD A FEELING OF GUILT OR REMORSE AFTER DRINKING: 0
HAS A RELATIVE, FRIEND, DOCTOR, OR ANOTHER HEALTH PROFESSIONAL EXPRESSED CONCERN ABOUT YOUR DRINKING OR SUGGESTED YOU CUT DOWN: 0

## 2020-09-23 ASSESSMENT — PATIENT HEALTH QUESTIONNAIRE - PHQ9
1. LITTLE INTEREST OR PLEASURE IN DOING THINGS: 1
SUM OF ALL RESPONSES TO PHQ9 QUESTIONS 1 & 2: 1
SUM OF ALL RESPONSES TO PHQ QUESTIONS 1-9: 1
2. FEELING DOWN, DEPRESSED OR HOPELESS: 0
SUM OF ALL RESPONSES TO PHQ QUESTIONS 1-9: 1

## 2020-09-23 NOTE — PATIENT INSTRUCTIONS
Personalized Preventive Plan for Cecilia García - 9/23/2020  Medicare offers a range of preventive health benefits. Some of the tests and screenings are paid in full while other may be subject to a deductible, co-insurance, and/or copay. Some of these benefits include a comprehensive review of your medical history including lifestyle, illnesses that may run in your family, and various assessments and screenings as appropriate. After reviewing your medical record and screening and assessments performed today your provider may have ordered immunizations, labs, imaging, and/or referrals for you. A list of these orders (if applicable) as well as your Preventive Care list are included within your After Visit Summary for your review. Other Preventive Recommendations:    · A preventive eye exam performed by an eye specialist is recommended every 1-2 years to screen for glaucoma; cataracts, macular degeneration, and other eye disorders. · A preventive dental visit is recommended every 6 months. · Try to get at least 150 minutes of exercise per week or 10,000 steps per day on a pedometer . · Order or download the FREE \"Exercise & Physical Activity: Your Everyday Guide\" from [employer]. Call 6-412.592.5295 or search [employer] online. · You need 7128-4389 mg of calcium and 6493-7884 IU of vitamin D per day. It is possible to meet your calcium requirement with diet alone, but a vitamin D supplement is usually necessary to meet this goal.  · When exposed to the sun, use a sunscreen that protects against both UVA and UVB radiation with an SPF of 30 or greater. Reapply every 2 to 3 hours or after sweating, drying off with a towel, or swimming. · Always wear a seat belt when traveling in a car. Always wear a helmet when riding a bicycle or motorcycle.

## 2020-09-23 NOTE — PROGRESS NOTES
Vaccine Information Sheet, \"Influenza - Inactivated\"  given to Harshal Foster, or parent/legal guardian of  Harshal Foster and verbalized understanding. Patient responses:    Have you ever had a reaction to a flu vaccine? No  Do you have any current illness? No  Have you ever had Guillian Rawlings Syndrome? No  Do you have a serious allergy to any of the following: Neomycin, Polymyxin, Thimerosal, eggs or egg products? No    Flu vaccine given per order. Please see immunization tab. Risks and benefits explained. Current VIS given.

## 2020-09-23 NOTE — PROGRESS NOTES
Medicare Annual Wellness Visit  Name: Ladonna Fischer Date: 2020   MRN: [id number] Sex: Female   Age: 72 y.o. Ethnicity: Non-/Non    : 1950 Race: Christiana Padilla is here for Medicare AWV and Health Maintenance (Diabetes Screen, Tdap)    Screenings for behavioral, psychosocial and functional/safety risks, and cognitive dysfunction are all negative except as indicated below. These results, as well as other patient data from the [address] form, are documented in Flowsheets linked to this Encounter. Allergies   Allergen Reactions    Nalbuphine      Other reaction(s): claustophobia; confusion  Other reaction(s): claustophobia; confusion    Sulfa Antibiotics Hives    Formaldehyde Rash    Nickel Rash    Morphine And Related Other (See Comments)    Oxycodone        Prior to Visit Medications    Medication Sig Taking?  Authorizing Provider   triamcinolone (KENALOG) 0.1 % ointment APPLY TO AFFECTED AREA TWICE A DAY AS NEEDED FOR UP TO 2 WEEKS, TAKE 1 WEEK BREAK, REPEAT AS NEEDED Yes Historical Provider, MD   vitamin C (ASCORBIC ACID) 500 MG tablet Take 500 mg by mouth daily Yes Historical Provider, MD   pantoprazole (PROTONIX) 40 MG tablet Take 1 tablet by mouth daily Yes DREW Lacy NP   albuterol sulfate HFA (PROAIR HFA) 108 (90 Base) MCG/ACT inhaler Inhale 2 puffs into the lungs every 6 hours as needed for Wheezing Yes RDEW Dominguez CNP   loratadine (CLARITIN) 10 MG tablet Take 1 tablet by mouth daily  Patient taking differently: Take 10 mg by mouth daily Patient uses PRN Yes DREW Dominguez CNP   valACYclovir (VALTREX) 1 g tablet Take 1,000 mg by mouth 2 times daily Patient uses PRN Yes Historical Provider, MD   Cyanocobalamin (VITAMIN B-12 CR PO) Take by mouth Yes Historical Provider, MD   Coenzyme Q10 (COQ10 PO) Take by mouth Yes Historical Provider, MD   Multiple Vitamins-Minerals (MULTIVITAMIN PO) Take by mouth Yes Historical Provider, MD   vitamin D (ERGOCALCIFEROL) 400 UNITS CAPS Take 400 Units by mouth daily Yes Historical Provider, MD   Calcium Carbonate-Vitamin D (CALCIUM + D) 600-200 MG-UNIT TABS Take  by mouth. Yes Historical Provider, MD       Past Medical History:   Diagnosis Date    Anxiety     Diverticulosis of colon     DJD (degenerative joint disease) 1/7/2015    Family history of colon cancer     Generalized OA 1/7/2015    GERD (gastroesophageal reflux disease)     Hyperlipidemia 1/7/2015    Irritable bowel syndrome     Osteoarthritis        Past Surgical History:   Procedure Laterality Date    CHOLECYSTECTOMY      COLONOSCOPY  2004    normal    COLONOSCOPY  4/25/14    diverticulosis    COLONOSCOPY N/A 2/9/2018    COLONOSCOPY POLYPECTOMY COLD BIOPSY performed by Ursula Hardwick MD at [address] [place] [address]  02/09/2018    Spasms in the sigmoid colon. No colitis or ileitis seen.     DILATION AND CURETTAGE OF UTERUS  1970    DILATION AND CURETTAGE OF UTERUS  1991    ENDOSCOPY, COLON, DIAGNOSTIC      JOINT REPLACEMENT Right 8/26/2013    hip    TONSILLECTOMY  1954    UPPER GASTROINTESTINAL ENDOSCOPY  2004    normal    UPPER GASTROINTESTINAL ENDOSCOPY  4/25/14    esophagitis with bx       Family History   Problem Relation Age of Onset    Other Mother         had many stomach surgeries    COPD Father     Cancer Father         colon    Other Brother         sepsis    Cancer Maternal Grandfather         colon       CareTeam (Including outside providers/suppliers regularly involved in providing care):   Patient Care Team:  DREW Steinberg CNP as PCP - General (Family Nurse Practitioner)  DERW Steinberg CNP as PCP - [facility] Empaneled Provider  Ursula Hardwick MD as Consulting Physician (Gastroenterology)  Sofy Zhou MD as Consulting Physician (Rheumatology)  Josse Chahal MD (Dermatology)  Yu Teresa MD as Consulting Physician (Allergy & Immunology)    Wt Readings from Last 3 Encounters:   09/23/20 158 lb 3.2 oz (71.8 kg)   07/20/20 152 lb 3.2 oz (69 kg)   02/04/20 154 lb (69.9 kg)     Vitals:    09/23/20 1057   BP: 120/74   Site: Left Upper Arm   Position: Sitting   Cuff Size: Medium Adult   Pulse: 66   Resp: 16   Temp: 97.7 °F (36.5 °C)   TempSrc: Infrared   Weight: 158 lb 3.2 oz (71.8 kg)   Height: 5' 2.5\" (1.588 m)     Body mass index is 28.47 kg/m². Based upon direct observation of the patient, evaluation of cognition reveals recent and remote memory intact. Patient's complete Health Risk Assessment and screening values have been reviewed and are found in Flowsheets. The following problems were reviewed today and where indicated follow up appointments were made and/or referrals ordered. Positive Risk Factor Screenings with Interventions:     General Health:  General  In general, how would you say your health is?: Very Good  In the past 7 days, have you experienced any of the following?  New or Increased Pain, New or Increased Fatigue, Loneliness, Social Isolation, Stress or Anger?: (!) Stress, Anger  Do you get the social and emotional support that you need?: Yes  Do you have a Living Will?: Yes  General Health Risk Interventions:  · Stress: stressed from family issues   · Anger: easily angered     Safety:  Safety  Do you have working smoke detectors?: Yes  Have all throw rugs been removed or fastened?: Yes  Do you have non-slip mats or surfaces in all bathtubs/showers?: (!) No  Do all of your stairways have a railing or banister?: Yes  Are your doorways, halls and stairs free of clutter?: Yes  Do you always fasten your seatbelt when you are in a car?: Yes  Safety Interventions:  · Home safety tips provided    Personalized Preventive Plan   Current Health Maintenance Status  Immunization History   Administered Date(s) Administered    Influenza Virus Vaccine 10/28/2003, 10/30/2007, 09/16/2009, 11/05/2010, 09/27/2011, 09/24/2012    Influenza, High Dose (Fluzone 65 yrs and older) 09/26/2018, 10/04/2019    Influenza, Quadv, IM, PF (6 mo and older Fluzone, Flulaval, Fluarix, and 3 yrs and older Afluria) 09/27/2017    Pneumococcal Conjugate 13-valent (Rilqrug00) 01/16/2017    Pneumococcal Polysaccharide (Hglveomit35) 08/25/2009, 12/23/2015    Zoster Live (Zostavax) 04/10/2012    Zoster Recombinant (Shingrix) 09/27/2019, 12/21/2019        Health Maintenance   Topic Date Due    DTaP/Tdap/Td vaccine (1 - Tdap) 07/23/1970    Diabetes screen  07/23/1991    Annual Wellness Visit (AWV)  05/29/2019    Flu vaccine (1) 09/01/2020    Pneumococcal 65+ years Vaccine (2 of 2 - PPSV23) 12/23/2020    Breast cancer screen  08/19/2022    Lipid screen  09/16/2025    Colon cancer screen colonoscopy  02/09/2028    DEXA (modify frequency per FRAX score)  Completed    Shingles Vaccine  Completed    Hepatitis C screen  Completed    Hepatitis A vaccine  Aged Out    Hepatitis B vaccine  Aged Out    Hib vaccine  Aged Out    Meningococcal (ACWY) vaccine  Aged Out     Recommendations for Consumr Due: see orders and patient instructions/AVS.  . Recommended screening schedule for the next 5-10 years is provided to the patient in written form: see Patient Essence Sapp was seen today for medicare awv and health maintenance.     Diagnoses and all orders for this visit:    Need for influenza vaccination  -     INFLUENZA, QUADV, ADJUVANTED, 65 YRS =, IM, PF, PREFILL SYR, 0.5ML (FLUAD)

## 2020-11-03 PROBLEM — M19.90 OSTEOARTHRITIS: Status: RESOLVED | Noted: 2020-11-03 | Resolved: 2020-11-03

## 2020-11-16 ENCOUNTER — TELEPHONE (OUTPATIENT)
Dept: FAMILY MEDICINE CLINIC | Age: 70
End: 2020-11-16

## 2020-11-16 ENCOUNTER — HOSPITAL ENCOUNTER (OUTPATIENT)
Age: 70
Setting detail: SPECIMEN
Discharge: HOME | End: 2020-11-16
Payer: MEDICARE

## 2020-11-16 ENCOUNTER — NURSE ONLY (OUTPATIENT)
Dept: PRIMARY CARE CLINIC | Age: 70
End: 2020-11-16

## 2020-11-16 NOTE — TELEPHONE ENCOUNTER
Patient called this morning and stated she has been experiencing extreme fatigue, weakness, cough, chills, upset stomach and headache since last Wednesday. Patient stated her daughter tested positive for COVID on Thursday. I gave patient the information to the [facility] and put an order in for her to have a COVID test done. I advised patient to stay hydrating, rest and use Tylenol/Motrin as needed.

## 2020-11-18 RX ORDER — VALACYCLOVIR HYDROCHLORIDE 1 G/1
1000 TABLET, FILM COATED ORAL 2 TIMES DAILY
Status: CANCELLED | OUTPATIENT
Start: 2020-11-18

## 2020-11-18 NOTE — PROGRESS NOTES
2020    TELEHEALTH EVALUATION -- Audio/Visual (During HEURG-33 public health emergency)    HPI:    Tamika Norris (:  1950) has requested an audio/video evaluation for the following concern(s):  72year old female presents with suspected covid. Has had body ache fatigue headache intermittent fever exertional sob for a wee and symptoms are getting worse over the weekend. States she doesn't have appetite and feels nauseated with upset stomach but no vomiting. States her daughter was tested positive for covid last Wednesday. Pt was tested on  with result pending. Pt has been in self quarantine   She feels very anxious, which makes her symptoms worse. Denies homicidal or suicidal ideation. Hx of GERD stable with PPI therapy     Review of Systems   Constitutional: Positive for chills, fatigue and fever. Body ache    HENT: Positive for congestion, postnasal drip, rhinorrhea and sore throat. Negative for sinus pressure. Respiratory: Positive for cough and shortness of breath. Cardiovascular: Negative for chest pain. Gastrointestinal: Negative for abdominal pain and nausea. Neurological: Positive for headaches. Negative for dizziness. Psychiatric/Behavioral: Negative for agitation, behavioral problems and suicidal ideas. The patient is nervous/anxious. Prior to Visit Medications    Medication Sig Taking?  Authorizing Provider   triamcinolone (KENALOG) 0.1 % ointment APPLY TO AFFECTED AREA TWICE A DAY AS NEEDED FOR UP TO 2 WEEKS, TAKE 1 WEEK BREAK, REPEAT AS NEEDED  Historical Provider, MD   vitamin C (ASCORBIC ACID) 500 MG tablet Take 500 mg by mouth daily  Historical Provider, MD   pantoprazole (PROTONIX) 40 MG tablet Take 1 tablet by mouth daily  DREW Bejarano - NP   albuterol sulfate HFA (PROAIR HFA) 108 (90 Base) MCG/ACT inhaler Inhale 2 puffs into the lungs every 6 hours as needed for Wheezing  DREW Kinsey - CNP   loratadine (CLARITIN) 10 MG tablet Take 1 tablet by mouth daily  Patient taking differently: Take 10 mg by mouth daily Patient uses PRN  DREW Bolanos - CNP   valACYclovir (VALTREX) 1 g tablet Take 1,000 mg by mouth 2 times daily Patient uses PRN  Historical Provider, MD   Cyanocobalamin (VITAMIN B-12 CR PO) Take by mouth  Historical Provider, MD   Coenzyme Q10 (COQ10 PO) Take by mouth  Historical Provider, MD   Multiple Vitamins-Minerals (MULTIVITAMIN PO) Take by mouth  Historical Provider, MD   vitamin D (ERGOCALCIFEROL) 400 UNITS CAPS Take 400 Units by mouth daily  Historical Provider, MD   Calcium Carbonate-Vitamin D (CALCIUM + D) 600-200 MG-UNIT TABS Take  by mouth. Historical Provider, MD       Social History     Tobacco Use    Smoking status: Former Smoker     Packs/day: 1.50     Years: 20.00     Pack years: 30.00     Last attempt to quit: 1990     Years since quittin.9    Smokeless tobacco: Never Used   Substance Use Topics    Alcohol use: Yes    Drug use: No          PHYSICAL EXAMINATION:    Constitutional: [x] Appears well-developed and well-nourished [x] No apparent distress      [x] Abnormal- appears ill   Mental status  [x] Alert and awake  [x] Oriented to person/place/time []Able to follow commands      Eyes:  EOM    [x]  Normal  [] Abnormal-  Sclera  [x]  Normal  [] Abnormal -         Discharge []  None visible  [] Abnormal -    HENT:   [x] Normocephalic, atraumatic.   [] Abnormal   [] Mouth/Throat: Mucous membranes are moist.     External Ears [x] Normal  [] Abnormal-     Neck: [x] No visualized mass     Pulmonary/Chest: [x] Respiratory effort normal.  [x] No visualized signs of difficulty breathing or respiratory distress        [] Abnormal-      Musculoskeletal:   [x] Normal gait with no signs of ataxia         [x] Normal range of motion of neck        [] Abnormal-       Neurological:        [x] No Facial Asymmetry (Cranial nerve 7 motor function) (limited exam to video visit)          [] No gaze palsy        [] Abnormal- Skin:        [x] No significant exanthematous lesions or discoloration noted on facial skin         [] Abnormal-            Psychiatric:       [] Normal Affect [x] No Hallucinations        [x] Abnormal- appears anxious     Other pertinent observable physical exam findings-     ASSESSMENT/PLAN:  BP Readings from Last 3 Encounters:   09/23/20 120/74   07/20/20 130/80   02/04/20 132/79     There were no vitals taken for this visit. Lab Results   Component Value Date    WBC 6.1 01/16/2017    HGB 13.6 01/16/2017    HCT 41.0 01/16/2017     01/16/2017    CHOL 173 09/16/2020    TRIG 94 09/16/2020    HDL 56 09/16/2020    ALT 15 11/21/2015    AST 15 11/21/2015     09/16/2020    K 4.8 09/16/2020     09/16/2020    CREATININE 0.54 09/16/2020    BUN 21 09/16/2020    CO2 23 09/16/2020    TSH 3.67 06/17/2015     Lab Results   Component Value Date    CALCIUM 9.2 09/16/2020     Lab Results   Component Value Date    LDLCALC 75 06/17/2015    LDLCHOLESTEROL 98 09/16/2020       1. Acute nonintractable headache, unspecified headache type/ 2. Nausea/ 3. Exposure to COVID-19 virus  - ondansetron (ZOFRAN ODT) 4 MG disintegrating tablet; Take 1 tablet by mouth every 8 hours as needed for Nausea or Vomiting  Dispense: 21 tablet; Refill: 0  - supportive therapy: zinc, vit c, vit d and baby ASA and melatonin ( insomnia). - increase fluids and rest, increase protein intake   - tylenol as needed for discomfort  - enc to call or go to ER  if symptoms are worsening    4. Anxiety  - start atarax as needed   - hydrOXYzine (ATARAX) 25 MG tablet; Take 1 tablet by mouth 3 times daily as needed for Anxiety  Dispense: 30 tablet;  Refill: 0    Requested Prescriptions     Signed Prescriptions Disp Refills    albuterol sulfate HFA (PROAIR HFA) 108 (90 Base) MCG/ACT inhaler 1 Inhaler 2     Sig: Inhale 2 puffs into the lungs every 6 hours as needed for Wheezing    ondansetron (ZOFRAN ODT) 4 MG disintegrating tablet 21 tablet 0     Sig: Take 1 tablet by mouth every 8 hours as needed for Nausea or Vomiting    hydrOXYzine (ATARAX) 25 MG tablet 30 tablet 0     Sig: Take 1 tablet by mouth 3 times daily as needed for Anxiety    diclofenac sodium (VOLTAREN) 1 %  g 0     Sig: Apply topically 2 times daily       Medications Discontinued During This Encounter   Medication Reason    triamcinolone (KENALOG) 0.1 % ointment Therapy completed    albuterol sulfate HFA (PROAIR HFA) 108 (90 Base) MCG/ACT inhaler REORDER   . Discussed use, benefit, and side effects of prescribed medications. Barriers to medication compliance addressed. All patient questions answered. Pt voiced understanding. No follow-ups on file. Fernando Ramires is a 72 y.o. female being evaluated by a Virtual Visit (video visit) encounter to address concerns as mentioned above. A caregiver was present when appropriate. Due to this being a TeleHealth encounter (During [facility]- public health emergency), evaluation of the following organ systems was limited: Vitals/Constitutional/EENT/Resp/CV/GI//MS/Neuro/Skin/Heme-Lymph-Imm. Pursuant to the emergency declaration under the [address] [place] [address], [address] [place] [address] authority and the [employer] Act, this Virtual Visit was conducted with patient's (and/or legal guardian's) consent, to reduce the patient's risk of exposure to COVID-19 and provide necessary medical care. The patient (and/or legal guardian) has also been advised to contact this office for worsening conditions or problems, and seek emergency medical treatment and/or call 911 if deemed necessary. Patient identification was verified at the start of the visit: Yes    Total time spent on this encounter: 25 minutes     Services were provided through a video synchronous discussion virtually to substitute for in-person clinic visit.  Patient and provider were located at their individual homes. An electronic signature was used to authenticate this note.

## 2020-11-19 ENCOUNTER — TELEMEDICINE (OUTPATIENT)
Dept: FAMILY MEDICINE CLINIC | Age: 70
End: 2020-11-19
Payer: MEDICARE

## 2020-11-19 PROCEDURE — 1090F PRES/ABSN URINE INCON ASSESS: CPT | Performed by: NURSE PRACTITIONER

## 2020-11-19 PROCEDURE — 1123F ACP DISCUSS/DSCN MKR DOCD: CPT | Performed by: NURSE PRACTITIONER

## 2020-11-19 PROCEDURE — 4040F PNEUMOC VAC/ADMIN/RCVD: CPT | Performed by: NURSE PRACTITIONER

## 2020-11-19 PROCEDURE — 99214 OFFICE O/P EST MOD 30 MIN: CPT | Performed by: NURSE PRACTITIONER

## 2020-11-19 PROCEDURE — 3017F COLORECTAL CA SCREEN DOC REV: CPT | Performed by: NURSE PRACTITIONER

## 2020-11-19 PROCEDURE — G8399 PT W/DXA RESULTS DOCUMENT: HCPCS | Performed by: NURSE PRACTITIONER

## 2020-11-19 PROCEDURE — G8427 DOCREV CUR MEDS BY ELIG CLIN: HCPCS | Performed by: NURSE PRACTITIONER

## 2020-11-19 RX ORDER — HYDROXYZINE HYDROCHLORIDE 25 MG/1
25 TABLET, FILM COATED ORAL 3 TIMES DAILY PRN
Qty: 30 TABLET | Refills: 0 | Status: SHIPPED | OUTPATIENT
Start: 2020-11-19 | End: 2021-01-06 | Stop reason: SDUPTHER

## 2020-11-19 RX ORDER — ONDANSETRON 4 MG/1
4 TABLET, ORALLY DISINTEGRATING ORAL EVERY 8 HOURS PRN
Qty: 21 TABLET | Refills: 0 | Status: SHIPPED | OUTPATIENT
Start: 2020-11-19 | End: 2020-11-30 | Stop reason: ALTCHOICE

## 2020-11-19 RX ORDER — ALBUTEROL SULFATE 90 UG/1
2 AEROSOL, METERED RESPIRATORY (INHALATION) EVERY 6 HOURS PRN
Qty: 1 INHALER | Refills: 2 | Status: SHIPPED | OUTPATIENT
Start: 2020-11-19

## 2020-11-19 ASSESSMENT — ENCOUNTER SYMPTOMS
ABDOMINAL PAIN: 0
NAUSEA: 0
SINUS PRESSURE: 0
COUGH: 1
SORE THROAT: 1
SHORTNESS OF BREATH: 1
RHINORRHEA: 1

## 2020-11-20 LAB — SARS-COV-2, NAA: DETECTED

## 2020-11-22 ENCOUNTER — APPOINTMENT (OUTPATIENT)
Dept: GENERAL RADIOLOGY | Age: 70
DRG: 177 | End: 2020-11-22
Payer: MEDICARE

## 2020-11-22 ENCOUNTER — HOSPITAL ENCOUNTER (INPATIENT)
Age: 70
LOS: 3 days | Discharge: HOME | DRG: 177 | End: 2020-11-25
Attending: EMERGENCY MEDICINE | Admitting: FAMILY MEDICINE
Payer: MEDICARE

## 2020-11-22 PROBLEM — K21.9 GASTROESOPHAGEAL REFLUX DISEASE WITHOUT ESOPHAGITIS: Status: ACTIVE | Noted: 2020-11-22

## 2020-11-22 PROBLEM — J96.01 ACUTE RESPIRATORY FAILURE WITH HYPOXIA (HCC): Status: ACTIVE | Noted: 2020-11-22

## 2020-11-22 PROBLEM — J12.82 PNEUMONIA DUE TO COVID-19 VIRUS: Status: ACTIVE | Noted: 2020-11-22

## 2020-11-22 PROBLEM — U07.1 PNEUMONIA DUE TO COVID-19 VIRUS: Status: ACTIVE | Noted: 2020-11-22

## 2020-11-22 PROBLEM — R79.89 ELEVATED LFTS: Status: ACTIVE | Noted: 2020-11-22

## 2020-11-22 LAB
ABO/RH: NORMAL
ABSOLUTE BANDS #: 0.61 K/UL (ref 0–1)
ABSOLUTE EOS #: 0.06 K/UL (ref 0–0.4)
ABSOLUTE IMMATURE GRANULOCYTE: ABNORMAL K/UL (ref 0–0.3)
ABSOLUTE LYMPH #: 1.28 K/UL (ref 1–4.8)
ABSOLUTE MONO #: 0.92 K/UL (ref 0.1–1.3)
ALBUMIN SERPL-MCNC: 3.1 G/DL (ref 3.5–5.2)
ALBUMIN/GLOBULIN RATIO: ABNORMAL (ref 1–2.5)
ALP BLD-CCNC: 61 U/L (ref 35–104)
ALT SERPL-CCNC: 19 U/L (ref 5–33)
ANION GAP SERPL CALCULATED.3IONS-SCNC: 11 MMOL/L (ref 9–17)
ANTIBODY SCREEN: NEGATIVE
ARM BAND NUMBER: NORMAL
AST SERPL-CCNC: 39 U/L
BANDS: 10 % (ref 0–10)
BASOPHILS # BLD: 0 % (ref 0–2)
BASOPHILS ABSOLUTE: 0 K/UL (ref 0–0.2)
BILIRUB SERPL-MCNC: 0.3 MG/DL (ref 0.3–1.2)
BLOOD BANK COMMENT: NORMAL
BUN BLDV-MCNC: 10 MG/DL (ref 8–23)
BUN/CREAT BLD: ABNORMAL (ref 9–20)
C-REACTIVE PROTEIN: 94.6 MG/L (ref 0–5)
CALCIUM SERPL-MCNC: 8.4 MG/DL (ref 8.6–10.4)
CHLORIDE BLD-SCNC: 102 MMOL/L (ref 98–107)
CO2: 26 MMOL/L (ref 20–31)
CREAT SERPL-MCNC: 0.57 MG/DL (ref 0.5–0.9)
DIFFERENTIAL TYPE: ABNORMAL
EOSINOPHILS RELATIVE PERCENT: 1 % (ref 0–4)
EXPIRATION DATE: NORMAL
FERRITIN: 566 UG/L (ref 13–150)
FIBRINOGEN: 741 MG/DL (ref 210–530)
GFR AFRICAN AMERICAN: >60 ML/MIN
GFR NON-AFRICAN AMERICAN: >60 ML/MIN
GFR SERPL CREATININE-BSD FRML MDRD: ABNORMAL ML/MIN/{1.73_M2}
GFR SERPL CREATININE-BSD FRML MDRD: ABNORMAL ML/MIN/{1.73_M2}
GLUCOSE BLD-MCNC: 114 MG/DL (ref 70–99)
HCT VFR BLD CALC: 32.9 % (ref 36–46)
HEMOGLOBIN: 11.6 G/DL (ref 12–16)
IMMATURE GRANULOCYTES: ABNORMAL %
INR BLD: 0.9
LACTATE DEHYDROGENASE: 353 U/L (ref 135–214)
LYMPHOCYTES # BLD: 21 % (ref 24–44)
MAGNESIUM: 2.1 MG/DL (ref 1.6–2.6)
MCH RBC QN AUTO: 30.2 PG (ref 26–34)
MCHC RBC AUTO-ENTMCNC: 35.1 G/DL (ref 31–37)
MCV RBC AUTO: 85.9 FL (ref 80–100)
METAMYELOCYTES ABSOLUTE COUNT: 0.06 K/UL
METAMYELOCYTES: 1 %
MONOCYTES # BLD: 15 % (ref 1–7)
MORPHOLOGY: NORMAL
NRBC AUTOMATED: ABNORMAL PER 100 WBC
PARTIAL THROMBOPLASTIN TIME: 31.7 SEC (ref 24–36)
PDW BLD-RTO: 12.6 % (ref 11.5–14.9)
PLATELET # BLD: 302 K/UL (ref 150–450)
PLATELET ESTIMATE: ABNORMAL
PMV BLD AUTO: 6.6 FL (ref 6–12)
POTASSIUM SERPL-SCNC: 3.5 MMOL/L (ref 3.7–5.3)
PROCALCITONIN: 0.13 NG/ML
PROTHROMBIN TIME: 12.5 SEC (ref 11.8–14.6)
RBC # BLD: 3.83 M/UL (ref 4–5.2)
RBC # BLD: ABNORMAL 10*6/UL
SEG NEUTROPHILS: 52 % (ref 36–66)
SEGMENTED NEUTROPHILS ABSOLUTE COUNT: 3.17 K/UL (ref 1.3–9.1)
SODIUM BLD-SCNC: 139 MMOL/L (ref 135–144)
TOTAL PROTEIN: 6.3 G/DL (ref 6.4–8.3)
TROPONIN INTERP: NORMAL
TROPONIN T: NORMAL NG/ML
TROPONIN, HIGH SENSITIVITY: <6 NG/L (ref 0–14)
WBC # BLD: 6.1 K/UL (ref 3.5–11)
WBC # BLD: ABNORMAL 10*3/UL

## 2020-11-22 PROCEDURE — 2580000003 HC RX 258: Performed by: FAMILY MEDICINE

## 2020-11-22 PROCEDURE — 6370000000 HC RX 637 (ALT 250 FOR IP): Performed by: FAMILY MEDICINE

## 2020-11-22 PROCEDURE — 85610 PROTHROMBIN TIME: CPT

## 2020-11-22 PROCEDURE — P9017 PLASMA 1 DONOR FRZ W/IN 8 HR: HCPCS

## 2020-11-22 PROCEDURE — 86927 PLASMA FRESH FROZEN: CPT

## 2020-11-22 PROCEDURE — 99285 EMERGENCY DEPT VISIT HI MDM: CPT

## 2020-11-22 PROCEDURE — 84145 PROCALCITONIN (PCT): CPT

## 2020-11-22 PROCEDURE — 71045 X-RAY EXAM CHEST 1 VIEW: CPT

## 2020-11-22 PROCEDURE — 86901 BLOOD TYPING SEROLOGIC RH(D): CPT

## 2020-11-22 PROCEDURE — 85025 COMPLETE CBC W/AUTO DIFF WBC: CPT

## 2020-11-22 PROCEDURE — 83735 ASSAY OF MAGNESIUM: CPT

## 2020-11-22 PROCEDURE — 36415 COLL VENOUS BLD VENIPUNCTURE: CPT

## 2020-11-22 PROCEDURE — 2060000000 HC ICU INTERMEDIATE R&B

## 2020-11-22 PROCEDURE — 86850 RBC ANTIBODY SCREEN: CPT

## 2020-11-22 PROCEDURE — 85384 FIBRINOGEN ACTIVITY: CPT

## 2020-11-22 PROCEDURE — 96374 THER/PROPH/DIAG INJ IV PUSH: CPT

## 2020-11-22 PROCEDURE — 86900 BLOOD TYPING SEROLOGIC ABO: CPT

## 2020-11-22 PROCEDURE — 6360000002 HC RX W HCPCS: Performed by: EMERGENCY MEDICINE

## 2020-11-22 PROCEDURE — 85730 THROMBOPLASTIN TIME PARTIAL: CPT

## 2020-11-22 PROCEDURE — 82728 ASSAY OF FERRITIN: CPT

## 2020-11-22 PROCEDURE — 80053 COMPREHEN METABOLIC PANEL: CPT

## 2020-11-22 PROCEDURE — 36430 TRANSFUSION BLD/BLD COMPNT: CPT

## 2020-11-22 PROCEDURE — 84484 ASSAY OF TROPONIN QUANT: CPT

## 2020-11-22 PROCEDURE — 83615 LACTATE (LD) (LDH) ENZYME: CPT

## 2020-11-22 PROCEDURE — 6360000002 HC RX W HCPCS: Performed by: FAMILY MEDICINE

## 2020-11-22 PROCEDURE — 86140 C-REACTIVE PROTEIN: CPT

## 2020-11-22 PROCEDURE — 87449 NOS EACH ORGANISM AG IA: CPT

## 2020-11-22 RX ORDER — ALBUTEROL SULFATE 90 UG/1
2 AEROSOL, METERED RESPIRATORY (INHALATION) EVERY 6 HOURS PRN
Status: DISCONTINUED | OUTPATIENT
Start: 2020-11-22 | End: 2020-11-25 | Stop reason: HOSPADM

## 2020-11-22 RX ORDER — SODIUM CHLORIDE 0.9 % (FLUSH) 0.9 %
10 SYRINGE (ML) INJECTION PRN
Status: DISCONTINUED | OUTPATIENT
Start: 2020-11-22 | End: 2020-11-25 | Stop reason: HOSPADM

## 2020-11-22 RX ORDER — PROMETHAZINE HYDROCHLORIDE 25 MG/1
12.5 TABLET ORAL EVERY 6 HOURS PRN
Status: DISCONTINUED | OUTPATIENT
Start: 2020-11-22 | End: 2020-11-25 | Stop reason: HOSPADM

## 2020-11-22 RX ORDER — DEXAMETHASONE SODIUM PHOSPHATE 10 MG/ML
6 INJECTION INTRAMUSCULAR; INTRAVENOUS ONCE
Status: COMPLETED | OUTPATIENT
Start: 2020-11-22 | End: 2020-11-22

## 2020-11-22 RX ORDER — SODIUM CHLORIDE 0.9 % (FLUSH) 0.9 %
10 SYRINGE (ML) INJECTION EVERY 12 HOURS SCHEDULED
Status: DISCONTINUED | OUTPATIENT
Start: 2020-11-22 | End: 2020-11-25 | Stop reason: HOSPADM

## 2020-11-22 RX ORDER — ONDANSETRON 2 MG/ML
4 INJECTION INTRAMUSCULAR; INTRAVENOUS EVERY 6 HOURS PRN
Status: DISCONTINUED | OUTPATIENT
Start: 2020-11-22 | End: 2020-11-25 | Stop reason: HOSPADM

## 2020-11-22 RX ORDER — MAGNESIUM SULFATE 1 G/100ML
1 INJECTION INTRAVENOUS PRN
Status: DISCONTINUED | OUTPATIENT
Start: 2020-11-22 | End: 2020-11-25 | Stop reason: HOSPADM

## 2020-11-22 RX ORDER — 0.9 % SODIUM CHLORIDE 0.9 %
30 INTRAVENOUS SOLUTION INTRAVENOUS PRN
Status: DISCONTINUED | OUTPATIENT
Start: 2020-11-22 | End: 2020-11-25 | Stop reason: HOSPADM

## 2020-11-22 RX ORDER — DEXTROMETHORPHAN POLISTIREX 30 MG/5ML
30 SUSPENSION ORAL EVERY 12 HOURS PRN
Status: DISCONTINUED | OUTPATIENT
Start: 2020-11-22 | End: 2020-11-24

## 2020-11-22 RX ORDER — HYDROXYZINE HYDROCHLORIDE 25 MG/1
25 TABLET, FILM COATED ORAL 3 TIMES DAILY PRN
Status: DISCONTINUED | OUTPATIENT
Start: 2020-11-22 | End: 2020-11-25 | Stop reason: HOSPADM

## 2020-11-22 RX ORDER — 0.9 % SODIUM CHLORIDE 0.9 %
20 INTRAVENOUS SOLUTION INTRAVENOUS ONCE
Status: DISCONTINUED | OUTPATIENT
Start: 2020-11-22 | End: 2020-11-25 | Stop reason: HOSPADM

## 2020-11-22 RX ORDER — ACETAMINOPHEN 325 MG/1
650 TABLET ORAL EVERY 6 HOURS PRN
Status: DISCONTINUED | OUTPATIENT
Start: 2020-11-22 | End: 2020-11-25 | Stop reason: HOSPADM

## 2020-11-22 RX ORDER — PANTOPRAZOLE SODIUM 40 MG/1
40 TABLET, DELAYED RELEASE ORAL DAILY
Status: DISCONTINUED | OUTPATIENT
Start: 2020-11-22 | End: 2020-11-25 | Stop reason: HOSPADM

## 2020-11-22 RX ORDER — DEXAMETHASONE SODIUM PHOSPHATE 10 MG/ML
6 INJECTION INTRAMUSCULAR; INTRAVENOUS DAILY
Status: DISCONTINUED | OUTPATIENT
Start: 2020-11-22 | End: 2020-11-23 | Stop reason: RX

## 2020-11-22 RX ORDER — POTASSIUM CHLORIDE 7.45 MG/ML
10 INJECTION INTRAVENOUS PRN
Status: DISCONTINUED | OUTPATIENT
Start: 2020-11-22 | End: 2020-11-25 | Stop reason: HOSPADM

## 2020-11-22 RX ORDER — LACTOBACILLUS RHAMNOSUS GG 10B CELL
1 CAPSULE ORAL 2 TIMES DAILY WITH MEALS
Status: DISCONTINUED | OUTPATIENT
Start: 2020-11-22 | End: 2020-11-25 | Stop reason: HOSPADM

## 2020-11-22 RX ORDER — ACETAMINOPHEN 650 MG/1
650 SUPPOSITORY RECTAL EVERY 6 HOURS PRN
Status: DISCONTINUED | OUTPATIENT
Start: 2020-11-22 | End: 2020-11-25 | Stop reason: HOSPADM

## 2020-11-22 RX ORDER — POTASSIUM CHLORIDE 20 MEQ/1
40 TABLET, EXTENDED RELEASE ORAL PRN
Status: DISCONTINUED | OUTPATIENT
Start: 2020-11-22 | End: 2020-11-25 | Stop reason: HOSPADM

## 2020-11-22 RX ADMIN — ENOXAPARIN SODIUM 40 MG: 40 INJECTION SUBCUTANEOUS at 23:15

## 2020-11-22 RX ADMIN — Medication 10 ML: at 23:13

## 2020-11-22 RX ADMIN — POTASSIUM CHLORIDE 40 MEQ: 1500 TABLET, EXTENDED RELEASE ORAL at 23:37

## 2020-11-22 RX ADMIN — DEXAMETHASONE SODIUM PHOSPHATE 6 MG: 10 INJECTION INTRAMUSCULAR; INTRAVENOUS at 16:40

## 2020-11-22 RX ADMIN — HYDROXYZINE HYDROCHLORIDE 25 MG: 25 TABLET, FILM COATED ORAL at 23:37

## 2020-11-22 ASSESSMENT — ENCOUNTER SYMPTOMS
EYE PAIN: 0
FACIAL SWELLING: 0
SORE THROAT: 0
DIARRHEA: 1
NAUSEA: 0
SHORTNESS OF BREATH: 1
BLOOD IN STOOL: 0
EYE DISCHARGE: 0
SINUS PRESSURE: 0
ABDOMINAL PAIN: 0
RHINORRHEA: 0
EYE REDNESS: 0
VOMITING: 0
EYE ITCHING: 0
WHEEZING: 0
BACK PAIN: 0
CHEST TIGHTNESS: 0
CONSTIPATION: 0
COUGH: 1
COLOR CHANGE: 0
TROUBLE SWALLOWING: 0

## 2020-11-22 ASSESSMENT — PAIN SCALES - GENERAL: PAINLEVEL_OUTOF10: 0

## 2020-11-22 NOTE — ED NOTES
Pt given meal tray, eats independently. Denies any other needs at this time.  Call light within reach     Kristin Flowers RN  11/22/20 0080

## 2020-11-22 NOTE — ED TRIAGE NOTES
Mode of arrival (squad #, walk in, police, etc) : Walk In        Chief complaint(s): Cough, shortness of breath        Arrival Note (brief scenario, treatment PTA, etc). : Pt arrives to ED c/o cough and shortness of breath. Patient states that she has tested positive for Covid-19. Patient states that she bought a pulse oxiemter for at home and noticed that it was reading in the 60's for her. Patient states that she has shortness of breath with exertion. Patient is found to be hypoxic in triage at 75%. Patient placed on 4L nasal cannula and SpO2 at 96%. C= \"Have you ever felt that you should Cut down on your drinking? \"  No  A= \"Have people Annoyed you by criticizing your drinking? \"  No  G= \"Have you ever felt bad or Guilty about your drinking? \"  No  E= \"Have you ever had a drink as an Eye-opener first thing in the morning to steady your nerves or to help a hangover? \"  No      Deferred []      Reason for deferring: N/A    *If yes to two or more: probable alcohol abuse. *

## 2020-11-22 NOTE — ED PROVIDER NOTES
[address]  eMERGENCY dEPARTMENT eNCOUnter      Pt Name: Leila Acevedo  MRN: [id number]  [place] 1950  Date of evaluation: 11/22/20      CHIEF COMPLAINT       Chief Complaint   Patient presents with    Cough    Shortness of Breath         HISTORY OF PRESENT ILLNESS    Leila Acevedo is a 72 y.o. female who presents complaining of shortness of breath. Patient has been sick for a little over a week. Patient had a cough shortness of breath and diarrhea. Patient states that she is really not had a fever. Patient's daughter and son-in-law tested positive for Covid so she got checked and she is positive as is her . Patient states that she got a home pulse oximeter and it was reading in the high 60s. Patient states she has no other lung issues that she knows of. Patient states that she is not having any muscle aches at this time. Patient denies chest pain. REVIEW OF SYSTEMS       Review of Systems   Constitutional: Negative for activity change, appetite change, chills, diaphoresis and fever. HENT: Negative for congestion, ear pain, facial swelling, nosebleeds, rhinorrhea, sinus pressure, sore throat and trouble swallowing. Eyes: Negative for pain, discharge and redness. Respiratory: Positive for cough and shortness of breath. Negative for chest tightness and wheezing. Cardiovascular: Negative for chest pain, palpitations and leg swelling. Gastrointestinal: Positive for diarrhea. Negative for abdominal pain, blood in stool, constipation, nausea and vomiting. Genitourinary: Negative for difficulty urinating, dysuria, flank pain, frequency, genital sores and hematuria. Musculoskeletal: Negative for arthralgias, back pain, gait problem, joint swelling, myalgias and neck pain. Skin: Negative for color change, pallor, rash and wound. Neurological: Negative for dizziness, tremors, seizures, syncope, speech difficulty, weakness, numbness and headaches. Psychiatric/Behavioral: Negative for confusion, decreased concentration, hallucinations, self-injury, sleep disturbance and suicidal ideas. PAST MEDICAL HISTORY     Past Medical History:   Diagnosis Date    Anxiety     Diverticulosis of colon     DJD (degenerative joint disease) 1/7/2015    Family history of colon cancer     Generalized OA 1/7/2015    GERD (gastroesophageal reflux disease)     Hyperlipidemia 1/7/2015    Irritable bowel syndrome     Osteoarthritis        SURGICAL HISTORY       Past Surgical History:   Procedure Laterality Date    CHOLECYSTECTOMY      COLONOSCOPY  2004    normal    COLONOSCOPY  4/25/14    diverticulosis    COLONOSCOPY N/A 2/9/2018    COLONOSCOPY POLYPECTOMY COLD BIOPSY performed by Justice Moseley MD at [address] [place] [address]  02/09/2018    Spasms in the sigmoid colon. No colitis or ileitis seen.  DILATION AND CURETTAGE OF UTERUS  1970    DILATION AND CURETTAGE OF UTERUS  1991    ENDOSCOPY, COLON, DIAGNOSTIC      JOINT REPLACEMENT Right 8/26/2013    hip    TONSILLECTOMY  1954    UPPER GASTROINTESTINAL ENDOSCOPY  2004    normal    UPPER GASTROINTESTINAL ENDOSCOPY  4/25/14    esophagitis with bx       CURRENT MEDICATIONS       Previous Medications    ALBUTEROL SULFATE HFA (PROAIR HFA) 108 (90 BASE) MCG/ACT INHALER    Inhale 2 puffs into the lungs every 6 hours as needed for Wheezing    CALCIUM CARBONATE-VITAMIN D (CALCIUM + D) 600-200 MG-UNIT TABS    Take  by mouth.     COENZYME Q10 (COQ10 PO)    Take by mouth    CYANOCOBALAMIN (VITAMIN B-12 CR PO)    Take by mouth    DICLOFENAC SODIUM (VOLTAREN) 1 % GEL    Apply topically 2 times daily    HYDROXYZINE (ATARAX) 25 MG TABLET    Take 1 tablet by mouth 3 times daily as needed for Anxiety    LORATADINE (CLARITIN) 10 MG TABLET    Take 1 tablet by mouth daily    MULTIPLE VITAMINS-MINERALS (MULTIVITAMIN PO)    Take by mouth    ONDANSETRON (ZOFRAN ODT) 4 MG DISINTEGRATING TABLET    Take 1 tablet by mouth every 8 hours as needed for Nausea or Vomiting    PANTOPRAZOLE (PROTONIX) 40 MG TABLET    Take 1 tablet by mouth daily    VALACYCLOVIR (VALTREX) 1 G TABLET    Take 1,000 mg by mouth 2 times daily Patient uses PRN    VITAMIN C (ASCORBIC ACID) 500 MG TABLET    Take 500 mg by mouth daily    VITAMIN D (ERGOCALCIFEROL) 400 UNITS CAPS    Take 400 Units by mouth daily       ALLERGIES     is allergic to nalbuphine; sulfa antibiotics; formaldehyde; nickel; morphine and related; and oxycodone. SOCIAL HISTORY      reports that she quit smoking about 30 years ago. She has a 30.00 pack-year smoking history. She has never used smokeless tobacco. She reports current alcohol use. She reports that she does not use drugs. PHYSICAL EXAM     INITIAL VITALS: /61   Pulse 64   Temp 98.5 °F (36.9 °C) (Oral)   Resp 20   Ht 5' 2\" (1.575 m)   Wt 155 lb (70.3 kg)   SpO2 93%   BMI 28.35 kg/m²      Physical Exam  Vitals signs and nursing note reviewed. Constitutional:       General: She is not in acute distress. Appearance: She is well-developed. She is not diaphoretic. HENT:      Head: Normocephalic and atraumatic. Eyes:      General: No scleral icterus. Right eye: No discharge. Left eye: No discharge. Conjunctiva/sclera: Conjunctivae normal.      Pupils: Pupils are equal, round, and reactive to light. Cardiovascular:      Rate and Rhythm: Normal rate and regular rhythm. Heart sounds: Normal heart sounds. No murmur. No friction rub. No gallop. Pulmonary:      Effort: Pulmonary effort is normal. No respiratory distress. Breath sounds: Normal breath sounds. No wheezing or rales. Chest:      Chest wall: No tenderness. Abdominal:      General: Bowel sounds are normal. There is no distension. Palpations: Abdomen is soft. There is no mass. Tenderness: There is no abdominal tenderness. There is no guarding or rebound. Musculoskeletal: Normal range of motion.          General: No tenderness. Skin:     General: Skin is warm and dry. Coloration: Skin is not pale. Findings: No erythema or rash. Neurological:      Mental Status: She is alert and oriented to person, place, and time. Cranial Nerves: No cranial nerve deficit. Sensory: No sensory deficit. Motor: No abnormal muscle tone. Coordination: Coordination normal.      Deep Tendon Reflexes: Reflexes normal.   Psychiatric:         Behavior: Behavior normal.         Thought Content: Thought content normal.         Judgment: Judgment normal.         DIAGNOSTIC RESULTS     RADIOLOGY:All plain film, CT,MRI, and formal ultrasound images (except ED bedside ultrasound) are read by the radiologist and the interpretations are directly viewed by the emergency physician. Xr Chest Portable    Result Date: 11/22/2020  EXAMINATION: ONE XRAY VIEW OF THE CHEST 11/22/2020 4:08 pm COMPARISON: 01/15/2015 HISTORY: ORDERING SYSTEM PROVIDED HISTORY: SOB TECHNOLOGIST PROVIDED HISTORY: SOB Reason for Exam: sob Acuity: Acute Type of Exam: Initial FINDINGS: There is ill-defined bilateral airspace disease particularly in the lateral aspects of both lungs. No pneumothorax or pleural fluid. The heart is borderline enlarged. Thoracic aorta is mildly tortuous. Mild bilateral shoulder arthropathy. No acute bone finding. Ill-defined multifocal airspace disease suspicious for pneumonia, possibly an atypical or viral pneumonia. LABS: All lab results were reviewed by myself, and all abnormals are listed below.   Labs Reviewed   CBC WITH AUTO DIFFERENTIAL - Abnormal; Notable for the following components:       Result Value    RBC 3.83 (*)     Hemoglobin 11.6 (*)     Hematocrit 32.9 (*)     Lymphocytes 21 (*)     Monocytes 15 (*)     Metamyelocytes 1 (*)     Metamyelocytes Absolute 0.06 (*)     All other components within normal limits   COMPREHENSIVE METABOLIC PANEL W/ REFLEX TO MG FOR LOW K - Abnormal; Notable for the following Answer:   COVID 19     remdesivir 100 mg in sodium chloride 0.9 % 250 mL IVPB      Order Specific Question:   Antimicrobial Indications      Answer: Other      Order Specific Question:   Other Abx Indication      Answer:   COVID 19    0.9 % sodium chloride bolus    0.9 % sodium chloride bolus       -------------------------  5:37 PM EST  Patient has been updated on results. Patient is requiring oxygen received steroids. Because of this she needs to be admitted. I spoke with Dr. Casandra Peace who is seen the patient and requested pulmonary consultation and infectious disease. I spoke with Dr. Jose Ramon Nelson for infectious disease who does want to start the remdesivir but also wants to start the convalescent plasma exchange. This has been ordered. I did speak with the patient and gave her the risks benefits and she did consent. CRITICAL CARE: There was a high probability of clinically significant/life threatening deterioration in this patient's condition which required my urgent intervention. Total critical care time was 30 minutes. This excludes any time for separately reportable procedures. CONSULTS:  IP CONSULT TO PRIMARY CARE PROVIDER  IP CONSULT TO PULMONOLOGY  IP CONSULT TO INFECTIOUS DISEASES    PROCEDURES:  None    FINAL IMPRESSION      1. COVID-19 virus infection    2. Hypoxia          DISPOSITION/PLAN   DISPOSITION Admitted 11/22/2020 05:15:19 PM      PATIENT REFERREDTO:  No follow-up provider specified.     DISCHARGEMEDICATIONS:  New Prescriptions    No medications on file       (Please note that portions of this note were completed with a voice recognition program.  Efforts were made to edit thedictations but occasionally words are mis-transcribed.)    Wilma Sommer MD  Attending Emergency Physician                        Wilma Sommer MD  11/22/20 [id number]

## 2020-11-22 NOTE — H&P
History and Physical      Name: Ania Barbosa  MRN: [id number]     Acct: [de-identified]  Room: [place] [id number]    Admit Date: 11/22/2020  PCP: DREW Ohara CNP      Chief Complaint:     Chief Complaint   Patient presents with    Cough    Shortness of Breath       History Obtained From:     patient, electronic medical record    History of Present Illness:      Ania Barbosa is a  72 y.o.  female who presents with Cough and Shortness of Breath   patient states that she started having cough shortness of breath, dry cough, chills for the last 7 days. Patient covid test 11/16/2020 is positive. Patient started having increased shortness of breath for the last 2 days. Patient also noticed 3 episodes of diarrhea since yesterday. Patient has a decreased appetite. Denies lightheadedness headache loss of smell nausea vomiting abdominal pain flank pain or chest pain    Past Medical History:     Past Medical History:   Diagnosis Date    Anxiety     Diverticulosis of colon     DJD (degenerative joint disease) 1/7/2015    Family history of colon cancer     Generalized OA 1/7/2015    GERD (gastroesophageal reflux disease)     Hyperlipidemia 1/7/2015    Irritable bowel syndrome     Osteoarthritis         Past Surgical History:     Past Surgical History:   Procedure Laterality Date    CHOLECYSTECTOMY      COLONOSCOPY  2004    normal    COLONOSCOPY  4/25/14    diverticulosis    COLONOSCOPY N/A 2/9/2018    COLONOSCOPY POLYPECTOMY COLD BIOPSY performed by Russell Sinha MD at [address] [place] [address]  02/09/2018    Spasms in the sigmoid colon. No colitis or ileitis seen.     DILATION AND CURETTAGE OF UTERUS  1970    DILATION AND CURETTAGE OF UTERUS  1991    ENDOSCOPY, COLON, DIAGNOSTIC      JOINT REPLACEMENT Right 8/26/2013    hip    TONSILLECTOMY  1954    UPPER GASTROINTESTINAL ENDOSCOPY  2004    normal    UPPER GASTROINTESTINAL ENDOSCOPY  4/25/14    esophagitis with bx        Medications Prior to Admission:       Prior to Admission medications    Medication Sig Start Date End Date Taking? Authorizing Provider   albuterol sulfate HFA (PROAIR HFA) 108 (90 Base) MCG/ACT inhaler Inhale 2 puffs into the lungs every 6 hours as needed for Wheezing 11/19/20  Yes Kojo OfficerDREW CNP   hydrOXYzine (ATARAX) 25 MG tablet Take 1 tablet by mouth 3 times daily as needed for Anxiety 11/19/20 11/29/20 Yes Kojo Officer, APRN - CNP   diclofenac sodium (VOLTAREN) 1 % GEL Apply topically 2 times daily 11/19/20  Yes Kojo Officer, APRN - CNP   vitamin C (ASCORBIC ACID) 500 MG tablet Take 500 mg by mouth daily   Yes Historical Provider, MD   pantoprazole (PROTONIX) 40 MG tablet Take 1 tablet by mouth daily 2/6/20  Yes DREW Galo NP   loratadine (CLARITIN) 10 MG tablet Take 1 tablet by mouth daily  Patient taking differently: Take 10 mg by mouth daily Patient uses PRN 6/28/19  Yes Kojo Officer, APRN - CNP   Cyanocobalamin (VITAMIN B-12 CR PO) Take by mouth   Yes Historical Provider, MD   Coenzyme Q10 (COQ10 PO) Take by mouth   Yes Historical Provider, MD   Multiple Vitamins-Minerals (MULTIVITAMIN PO) Take by mouth   Yes Historical Provider, MD   vitamin D (ERGOCALCIFEROL) 400 UNITS CAPS Take 400 Units by mouth daily   Yes Historical Provider, MD   Calcium Carbonate-Vitamin D (CALCIUM + D) 600-200 MG-UNIT TABS Take  by mouth. Yes Historical Provider, MD   ondansetron (ZOFRAN ODT) 4 MG disintegrating tablet Take 1 tablet by mouth every 8 hours as needed for Nausea or Vomiting 11/19/20   Kojo Officer APRN - CNP   valACYclovir (VALTREX) 1 g tablet Take 1,000 mg by mouth 2 times daily Patient uses PRN    Historical Provider, MD        Allergies:       Nalbuphine; Sulfa antibiotics; Formaldehyde; Nickel; Morphine and related; and Oxycodone    Social History:     Tobacco:    reports that she quit smoking about 30 years ago. She has a 30.00 pack-year smoking history.  She has never used smokeless tobacco.  Alcohol:      reports current 44 %    Monocytes 15 (H) 1 - 7 %    Eosinophils % 1 0 - 4 %    Basophils 0 0 - 2 %    Bands 10 0 - 10 %    Metamyelocytes 1 (H) 0 %    Segs Absolute 3.17 1.3 - 9.1 k/uL    Absolute Lymph # 1.28 1.0 - 4.8 k/uL    Absolute Mono # 0.92 0.1 - 1.3 k/uL    Absolute Eos # 0.06 0.0 - 0.4 k/uL    Basophils Absolute 0.00 0.0 - 0.2 k/uL    Absolute Bands # 0.61 0.0 - 1.0 k/uL    Metamyelocytes Absolute 0.06 (H) 0 k/uL    Morphology Normal    Comprehensive Metabolic Panel w/ Reflex to MG    Collection Time: 11/22/20  3:52 PM   Result Value Ref Range    Glucose 114 (H) 70 - 99 mg/dL    BUN 10 8 - 23 mg/dL    CREATININE 0.57 0.50 - 0.90 mg/dL    Bun/Cre Ratio NOT REPORTED 9 - 20    Calcium 8.4 (L) 8.6 - 10.4 mg/dL    Sodium 139 135 - 144 mmol/L    Potassium 3.5 (L) 3.7 - 5.3 mmol/L    Chloride 102 98 - 107 mmol/L    CO2 26 20 - 31 mmol/L    Anion Gap 11 9 - 17 mmol/L    Alkaline Phosphatase 61 35 - 104 U/L    ALT 19 5 - 33 U/L    AST 39 (H) <32 U/L    Total Bilirubin 0.30 0.3 - 1.2 mg/dL    Total Protein 6.3 (L) 6.4 - 8.3 g/dL    Alb 3.1 (L) 3.5 - 5.2 g/dL    Albumin/Globulin Ratio NOT REPORTED 1.0 - 2.5    GFR Non-African American >60 >60 mL/min    GFR African American >60 >60 mL/min    GFR Comment          GFR Staging NOT REPORTED    Protime-INR    Collection Time: 11/22/20  3:52 PM   Result Value Ref Range    Protime 12.5 11.8 - 14.6 sec    INR 0.9    APTT    Collection Time: 11/22/20  3:52 PM   Result Value Ref Range    PTT 31.7 24.0 - 36.0 sec   Fibrinogen    Collection Time: 11/22/20  3:52 PM   Result Value Ref Range    Fibrinogen 741 (H) 210 - 530 mg/dL   Procalcitonin    Collection Time: 11/22/20  3:52 PM   Result Value Ref Range    Procalcitonin 0.13 (H) <0.09 ng/mL   C-Reactive Protein    Collection Time: 11/22/20  3:52 PM   Result Value Ref Range    CRP 94.6 (H) 0.0 - 5.0 mg/L   Lactate Dehydrogenase    Collection Time: 11/22/20  3:52 PM   Result Value Ref Range     (H) 135 - 214 U/L   Troponin    Collection Time: 11/22/20  3:52 PM   Result Value Ref Range    Troponin, High Sensitivity <6 0 - 14 ng/L    Troponin T NOT REPORTED <0.03 ng/mL    Troponin Interp NOT REPORTED    Magnesium    Collection Time: 11/22/20  3:52 PM   Result Value Ref Range    Magnesium 2.1 1.6 - 2.6 mg/dL       Assesment:     Primary Problem  Acute respiratory failure with hypoxia (HCC)    Principal Problem:    Acute respiratory failure with hypoxia (HCC)  Active Problems:    Pneumonia due to COVID-19 virus    Gastroesophageal reflux disease without esophagitis    Elevated LFTs  Resolved Problems:    * No resolved hospital problems. *      Plan:     1. IV Decadron 6 mg daily  2.  prone positioning encouraged  3. CBC, CMP  4.   lactobacillus 2 times a day  5.  check Legionella antigen urine  6.  consult pulmonology  7.  consult ID  8. DVT prophylaxis  Lovenox 40 mg subcu daily  9. EPCs  10.  check and replace electrolytes per sliding scale  11.   restart home medications        Electronically signed by Jewels Vazquez MD     Copy sent to Dr. Katia Beckham, APRN - CNP

## 2020-11-22 NOTE — ED NOTES
Admission Dx: Covid-19; Hypoxia    Pts Chief Complaints on Arrival: SOB; Cough    ADL's - Self-care    Pending Diagnostics:  N/A    Residence PTA: Home    Special Considerations/Circumstances:  N/A    Vitals: Current vital signs:  /61   Pulse 64   Temp 98.5 °F (36.9 °C) (Oral)   Resp 20   Ht 5' 2\" (1.575 m)   Wt 155 lb (70.3 kg)   SpO2 93%   BMI 28.35 kg/m²                       Zainab Condon RN  11/22/20 [id number]

## 2020-11-23 LAB
ABSOLUTE BANDS #: 0.05 K/UL (ref 0–1)
ABSOLUTE EOS #: 0 K/UL (ref 0–0.4)
ABSOLUTE IMMATURE GRANULOCYTE: ABNORMAL K/UL (ref 0–0.3)
ABSOLUTE LYMPH #: 0.81 K/UL (ref 1–4.8)
ABSOLUTE MONO #: 0.32 K/UL (ref 0.1–1.3)
ALBUMIN SERPL-MCNC: 2.9 G/DL (ref 3.5–5.2)
ALBUMIN/GLOBULIN RATIO: ABNORMAL (ref 1–2.5)
ALP BLD-CCNC: 55 U/L (ref 35–104)
ALT SERPL-CCNC: 17 U/L (ref 5–33)
ANION GAP SERPL CALCULATED.3IONS-SCNC: 6 MMOL/L (ref 9–17)
AST SERPL-CCNC: 32 U/L
BANDS: 1 % (ref 0–10)
BASOPHILS # BLD: 0 % (ref 0–2)
BASOPHILS ABSOLUTE: 0 K/UL (ref 0–0.2)
BILIRUB SERPL-MCNC: 0.22 MG/DL (ref 0.3–1.2)
BUN BLDV-MCNC: 10 MG/DL (ref 8–23)
BUN/CREAT BLD: ABNORMAL (ref 9–20)
CALCIUM SERPL-MCNC: 8.4 MG/DL (ref 8.6–10.4)
CHLORIDE BLD-SCNC: 105 MMOL/L (ref 98–107)
CO2: 28 MMOL/L (ref 20–31)
CREAT SERPL-MCNC: 0.53 MG/DL (ref 0.5–0.9)
DIFFERENTIAL TYPE: ABNORMAL
EOSINOPHILS RELATIVE PERCENT: 0 % (ref 0–4)
GFR AFRICAN AMERICAN: >60 ML/MIN
GFR NON-AFRICAN AMERICAN: >60 ML/MIN
GFR SERPL CREATININE-BSD FRML MDRD: ABNORMAL ML/MIN/{1.73_M2}
GFR SERPL CREATININE-BSD FRML MDRD: ABNORMAL ML/MIN/{1.73_M2}
GLUCOSE BLD-MCNC: 141 MG/DL (ref 70–99)
HCT VFR BLD CALC: 29.5 % (ref 36–46)
HEMOGLOBIN: 10.5 G/DL (ref 12–16)
IMMATURE GRANULOCYTES: ABNORMAL %
LEGIONELLA PNEUMOPHILIA AG, URINE: NEGATIVE
LYMPHOCYTES # BLD: 18 % (ref 24–44)
MCH RBC QN AUTO: 30.7 PG (ref 26–34)
MCHC RBC AUTO-ENTMCNC: 35.5 G/DL (ref 31–37)
MCV RBC AUTO: 86.6 FL (ref 80–100)
MONOCYTES # BLD: 7 % (ref 1–7)
MORPHOLOGY: ABNORMAL
MORPHOLOGY: ABNORMAL
NRBC AUTOMATED: ABNORMAL PER 100 WBC
PDW BLD-RTO: 12.6 % (ref 11.5–14.9)
PLATELET # BLD: 276 K/UL (ref 150–450)
PLATELET ESTIMATE: ABNORMAL
PMV BLD AUTO: 6.9 FL (ref 6–12)
POTASSIUM SERPL-SCNC: 4.7 MMOL/L (ref 3.7–5.3)
RBC # BLD: 3.41 M/UL (ref 4–5.2)
RBC # BLD: ABNORMAL 10*6/UL
SEG NEUTROPHILS: 74 % (ref 36–66)
SEGMENTED NEUTROPHILS ABSOLUTE COUNT: 3.32 K/UL (ref 1.3–9.1)
SODIUM BLD-SCNC: 139 MMOL/L (ref 135–144)
TOTAL PROTEIN: 5.8 G/DL (ref 6.4–8.3)
WBC # BLD: 4.5 K/UL (ref 3.5–11)
WBC # BLD: ABNORMAL 10*3/UL

## 2020-11-23 PROCEDURE — 2060000000 HC ICU INTERMEDIATE R&B

## 2020-11-23 PROCEDURE — 6370000000 HC RX 637 (ALT 250 FOR IP): Performed by: FAMILY MEDICINE

## 2020-11-23 PROCEDURE — XW13325 TRANSFUSION OF CONVALESCENT PLASMA (NONAUTOLOGOUS) INTO PERIPHERAL VEIN, PERCUTANEOUS APPROACH, NEW TECHNOLOGY GROUP 5: ICD-10-PCS | Performed by: INTERNAL MEDICINE

## 2020-11-23 PROCEDURE — 6370000000 HC RX 637 (ALT 250 FOR IP): Performed by: INTERNAL MEDICINE

## 2020-11-23 PROCEDURE — 36415 COLL VENOUS BLD VENIPUNCTURE: CPT

## 2020-11-23 PROCEDURE — 2580000003 HC RX 258: Performed by: FAMILY MEDICINE

## 2020-11-23 PROCEDURE — 6360000002 HC RX W HCPCS: Performed by: FAMILY MEDICINE

## 2020-11-23 PROCEDURE — XW033E5 INTRODUCTION OF REMDESIVIR ANTI-INFECTIVE INTO PERIPHERAL VEIN, PERCUTANEOUS APPROACH, NEW TECHNOLOGY GROUP 5: ICD-10-PCS | Performed by: INTERNAL MEDICINE

## 2020-11-23 PROCEDURE — 99222 1ST HOSP IP/OBS MODERATE 55: CPT | Performed by: INTERNAL MEDICINE

## 2020-11-23 PROCEDURE — 2580000003 HC RX 258: Performed by: INTERNAL MEDICINE

## 2020-11-23 PROCEDURE — 2500000003 HC RX 250 WO HCPCS: Performed by: INTERNAL MEDICINE

## 2020-11-23 PROCEDURE — 80053 COMPREHEN METABOLIC PANEL: CPT

## 2020-11-23 PROCEDURE — 85025 COMPLETE CBC W/AUTO DIFF WBC: CPT

## 2020-11-23 RX ORDER — DEXAMETHASONE SODIUM PHOSPHATE 4 MG/ML
6 INJECTION, SOLUTION INTRA-ARTICULAR; INTRALESIONAL; INTRAMUSCULAR; INTRAVENOUS; SOFT TISSUE DAILY
Status: DISCONTINUED | OUTPATIENT
Start: 2020-11-23 | End: 2020-11-25 | Stop reason: HOSPADM

## 2020-11-23 RX ORDER — OMEGA-3S/DHA/EPA/FISH OIL/D3 300MG-1000
400 CAPSULE ORAL DAILY
Status: DISCONTINUED | OUTPATIENT
Start: 2020-11-23 | End: 2020-11-25 | Stop reason: HOSPADM

## 2020-11-23 RX ORDER — ASCORBIC ACID 500 MG
500 TABLET ORAL DAILY
Status: DISCONTINUED | OUTPATIENT
Start: 2020-11-23 | End: 2020-11-25 | Stop reason: HOSPADM

## 2020-11-23 RX ADMIN — Medication 30 MG: at 12:46

## 2020-11-23 RX ADMIN — DEXAMETHASONE SODIUM PHOSPHATE 6 MG: 4 INJECTION, SOLUTION INTRAMUSCULAR; INTRAVENOUS at 18:22

## 2020-11-23 RX ADMIN — Medication 10 ML: at 09:16

## 2020-11-23 RX ADMIN — HYDROXYZINE HYDROCHLORIDE 25 MG: 25 TABLET, FILM COATED ORAL at 19:51

## 2020-11-23 RX ADMIN — PANTOPRAZOLE SODIUM 40 MG: 40 TABLET, DELAYED RELEASE ORAL at 09:15

## 2020-11-23 RX ADMIN — Medication 30 MG: at 00:54

## 2020-11-23 RX ADMIN — HYDROXYZINE HYDROCHLORIDE 25 MG: 25 TABLET, FILM COATED ORAL at 12:46

## 2020-11-23 RX ADMIN — REMDESIVIR 100 MG: 100 INJECTION, POWDER, LYOPHILIZED, FOR SOLUTION INTRAVENOUS at 18:22

## 2020-11-23 RX ADMIN — REMDESIVIR 200 MG: 100 INJECTION, POWDER, LYOPHILIZED, FOR SOLUTION INTRAVENOUS at 00:46

## 2020-11-23 RX ADMIN — Medication 1 CAPSULE: at 09:15

## 2020-11-23 RX ADMIN — CHOLECALCIFEROL TAB 10 MCG (400 UNIT) 400 UNITS: 10 TAB at 18:21

## 2020-11-23 RX ADMIN — Medication 10 ML: at 19:41

## 2020-11-23 RX ADMIN — ENOXAPARIN SODIUM 40 MG: 40 INJECTION SUBCUTANEOUS at 09:15

## 2020-11-23 RX ADMIN — Medication 1 CAPSULE: at 18:21

## 2020-11-23 NOTE — PROGRESS NOTES
Called Dr. Flor Verdin aware of consult.  Spoke with Dr. Martha Montemayor, aware of plan and situation

## 2020-11-23 NOTE — ACP (ADVANCE CARE PLANNING)
Advance Care Planning     Advance Care Planning Activator (Inpatient)  Conversation Note      Date of ACP Conversation: 11/22/2020    Conversation Conducted with: Patient with Decision Making Capacity    ACP Activator: [address] makes decisions on behalf of the incapacitated patient: Decision Maker is asked to consider and make decisions based on patient values, known preferences, or best interests. Health Care Decision Maker:     Current Designated Health Care Decision Maker:   (If there is a valid [place] named in the [address] [employer]\" box in the ACP activity, but it is not visible above, be sure to open that field and then select the health care decision maker relationship (ie \"primary\") in the blank space to the right of the name.) Validate  this information as still accurate & up-to-date; edit [place] field as needed.)    Note: Assess and validate information in current ACP documents, as indicated. If no Decision Maker listed above or available through scanned documents, then:    If no Authorized Decision Maker has previously been identified, then patient chooses [place]:  \"Who would you like to name as your primary health care decision-maker? \"               Name: Araceli Leal        Relationship:           Phone number: [phone]  \"Can this person be reached easily? \" Yes      Note: If the relationship of these Decision-Makers to the patient does NOT follow your state's Next of Kin hierarchy, recommend that patient complete ACP document that meets state-specific requirements to allow them to act on the patient's behalf when appropriate. Care Preferences    Ventilation: \"If you were in your present state of health and suddenly became very ill and were unable to breathe on your own, what would your preference be about the use of a ventilator (breathing machine) if it were available to you? \" Would the patient desire the use of ventilator (breathing machine)?: yes    \"If your health worsens and it becomes clear that your chance of recovery is unlikely, what would your preference be about the use of a ventilator (breathing machine) if it were available to you? \"     Would the patient desire the use of ventilator (breathing machine)?: Yes      Resuscitation  \"CPR works best to restart the heart when there is a sudden event, like a heart attack, in someone who is otherwise healthy. Unfortunately, CPR does not typically restart the heart for people who have serious health conditions or who are very sick. \"    \"In the event your heart stopped as a result of an underlying serious health condition, would you want attempts to be made to restart your heart (answer \"yes\" for attempt to resuscitate) or would you prefer a natural death (answer \"no\" for do not attempt to resuscitate)? \" yes      NOTE: If the patient has a valid advance directive AND now provides care preference(s) that are inconsistent with that prior directive, advise the patient to consider either: creating a new advance directive that complies with state-specific requirements; or, if that is not possible, orally revoking that prior directive in accordance with state-specific requirements, which must be documented in the EHR. [x] Yes   [] No   Educated Patient / Geronimo Campos regarding differences between Advance Directives and portable DNR orders.     Length of ACP Conversation in minutes:  10 minutes    Conversation Outcomes:  [x] ACP discussion completed  [] Existing advance directive reviewed with patient; no changes to patient's previously recorded wishes  [] New Advance Directive completed  [] Portable Do Not Rescitate prepared for Provider review and signature  [] POLST/POST/MOLST/MOST prepared for Provider review and signature      Follow-up plan:    [x] Schedule follow-up conversation to continue planning  [] Referred individual to Provider for additional questions/concerns   [] Advised patient/agent/surrogate to review completed ACP document and update if needed with changes in condition, patient preferences or care setting    [] This note routed to one or more involved healthcare providers

## 2020-11-23 NOTE — PROGRESS NOTES
Patient educated on incentive spirometer. Discussed importance of use. Patient demonstrates proper use and states that she will set goals and continue to use the spirometer a few times per hour.

## 2020-11-23 NOTE — PROGRESS NOTES
Plasma being administered at this time, per order. Patient is tolerating well. No distress noted. Vitals WNL. Will continue to monitor.

## 2020-11-23 NOTE — CONSULTS
Current Facility-Administered Medications   Medication Dose Route Frequency Provider Last Rate Last Dose    sodium chloride flush 0.9 % injection 10 mL  10 mL Intravenous 2 times per day Ksenia Campos MD   10 mL at 11/23/20 0916    sodium chloride flush 0.9 % injection 10 mL  10 mL Intravenous PRN Ksenia Campos MD        potassium chloride (KLOR-CON M) extended release tablet 40 mEq  40 mEq Oral PRN Ksenia Campos MD   40 mEq at 11/22/20 2337    Or    potassium bicarb-citric acid (EFFER-K) effervescent tablet 40 mEq  40 mEq Oral PRN Ksenia Campos MD        Or    potassium chloride 10 mEq/100 mL IVPB (Peripheral Line)  10 mEq Intravenous PRN Ksenia Campos MD        magnesium sulfate 1 g in dextrose 5% 100 mL IVPB  1 g Intravenous PRN Ksenia Campos MD        acetaminophen (TYLENOL) tablet 650 mg  650 mg Oral Q6H PRN Ksenia Campos MD        Or   Gianfranco Favorite acetaminophen (TYLENOL) suppository 650 mg  650 mg Rectal Q6H PRN Ksenia Campos MD        magnesium hydroxide (MILK OF MAGNESIA) 400 MG/5ML suspension 30 mL  30 mL Oral Daily PRN Ksenia Campos MD        promethazine (PHENERGAN) tablet 12.5 mg  12.5 mg Oral Q6H PRN Ksenia Campos MD        Or    ondansetron (ZOFRAN) injection 4 mg  4 mg Intravenous Q6H PRN Ksenia Campos MD        enoxaparin (LOVENOX) injection 40 mg  40 mg Subcutaneous Daily Ksenia Campos MD   40 mg at 11/23/20 0915    dexamethasone (DECADRON) injection 6 mg  6 mg Intravenous Daily Ksenia Campos MD        albuterol sulfate  (90 Base) MCG/ACT inhaler 2 puff  2 puff Inhalation Q6H PRN Ksenia Campos MD        hydrOXYzine (ATARAX) tablet 25 mg  25 mg Oral TID PRN Ksenia Campos MD   25 mg at 11/22/20 2337    pantoprazole (PROTONIX) tablet 40 mg  40 mg Oral Daily Ksenia Campos MD   40 mg at 11/23/20 0915    remdesivir 100 mg in sodium chloride 0.9 % 250 mL IVPB  100 mg Intravenous Q24H Belen Susy Barrow, MD        0.9 % sodium chloride bolus  30 mL Intravenous PRN Belen Osuna MD        0.9 % sodium chloride bolus  20 mL Intravenous Once Milan Bose MD        dextromethorphan [facility]) 30 MG/5ML extended release liquid 30 mg  30 mg Oral Q12H PRN Jessica Santos MD   30 mg at 11/23/20 0054    lactobacillus (CULTURELLE) capsule 1 capsule  1 capsule Oral BID  Jessica Santos MD   1 capsule at 11/23/20 0915      PULMONARY  CONSULT NOTE      Date of Admission: 11/22/2020  3:33 PM    Reason for Consult: COVID, resp failure    Referring Physician: DR Samson Ramos  PCP: DREW Sheffield - CNP     History of Present Illness:     72 y.o. female who presents complaining of shortness of breath. Patient has been sick for a little over a week. Patient had a cough shortness of breath and diarrhea. Patient states that she is really not had a fever. Patient's daughter and son-in-law tested positive for Covid so she got checked and she is positive as is her . Patient states that she got a home pulse oximeter and it was reading in the high 60s. Patient states she has no other lung issues that she knows of. Patient states that she is not having any muscle aches at this time. Patient denies chest pain. Problem:  Principal Problem:     PMH:   Past Medical History:   Diagnosis Date    Anxiety     Diverticulosis of colon     DJD (degenerative joint disease) 1/7/2015    Family history of colon cancer     Generalized OA 1/7/2015    GERD (gastroesophageal reflux disease)     Hyperlipidemia 1/7/2015    Irritable bowel syndrome     Osteoarthritis        PSH:   Past Surgical History:   Procedure Laterality Date    CHOLECYSTECTOMY      COLONOSCOPY  2004    normal    COLONOSCOPY  4/25/14    diverticulosis    COLONOSCOPY N/A 2/9/2018    COLONOSCOPY POLYPECTOMY COLD BIOPSY performed by Shavon Velazquez MD at [address]  02/09/2018    Spasms in the sigmoid colon. No colitis or ileitis seen.     DILATION AND CURETTAGE OF UTERUS  1970    DILATION AND CURETTAGE OF UTERUS  1991    ENDOSCOPY, COLON, DIAGNOSTIC      JOINT REPLACEMENT Right 8/26/2013    hip    TONSILLECTOMY  1954    UPPER GASTROINTESTINAL ENDOSCOPY  2004    normal    UPPER GASTROINTESTINAL ENDOSCOPY  4/25/14    esophagitis with bx       Allergies: Allergies   Allergen Reactions    Nalbuphine      Other reaction(s): claustophobia; confusion  Other reaction(s): claustophobia; confusion    Sulfa Antibiotics Hives    Formaldehyde Rash    Nickel Rash    Morphine And Related Other (See Comments)    Oxycodone        Home Meds:  Medications Prior to Admission: albuterol sulfate HFA (PROAIR HFA) 108 (90 Base) MCG/ACT inhaler, Inhale 2 puffs into the lungs every 6 hours as needed for Wheezing  hydrOXYzine (ATARAX) 25 MG tablet, Take 1 tablet by mouth 3 times daily as needed for Anxiety  diclofenac sodium (VOLTAREN) 1 % GEL, Apply topically 2 times daily  vitamin C (ASCORBIC ACID) 500 MG tablet, Take 500 mg by mouth daily  pantoprazole (PROTONIX) 40 MG tablet, Take 1 tablet by mouth daily  loratadine (CLARITIN) 10 MG tablet, Take 1 tablet by mouth daily (Patient taking differently: Take 10 mg by mouth daily Patient uses PRN)  Cyanocobalamin (VITAMIN B-12 CR PO), Take by mouth  Coenzyme Q10 (COQ10 PO), Take by mouth  Multiple Vitamins-Minerals (MULTIVITAMIN PO), Take by mouth  vitamin D (ERGOCALCIFEROL) 400 UNITS CAPS, Take 400 Units by mouth daily  Calcium Carbonate-Vitamin D (CALCIUM + D) 600-200 MG-UNIT TABS, Take  by mouth.   ondansetron (ZOFRAN ODT) 4 MG disintegrating tablet, Take 1 tablet by mouth every 8 hours as needed for Nausea or Vomiting  valACYclovir (VALTREX) 1 g tablet, Take 1,000 mg by mouth 2 times daily Patient uses PRN    Social History:   Social History     Socioeconomic History    Marital status:      Spouse name: Not on file    Number of children: Not on file    Years of education: Not on file    Highest education level: Not on file   Occupational History    Not on file   Social Needs    Financial resource strain: Not hard at all   [place]-Loren insecurity     Worry: Never true     Inability: Never true   [employer] needs     Medical: Not on file     Non-medical: Not on file   Tobacco Use    Smoking status: Former Smoker     Packs/day: 1.50     Years: 20.00     Pack years: 30.00     Last attempt to quit: 1990     Years since quittin.9    Smokeless tobacco: Never Used   Substance and Sexual Activity    Alcohol use:  Yes    Drug use: No    Sexual activity: Yes   Lifestyle    Physical activity     Days per week: Not on file     Minutes per session: Not on file    Stress: Not on file   Relationships    Social connections     Talks on phone: Not on file     Gets together: Not on file     Attends Gnosticist service: Not on file     Active member of club or organization: Not on file     Attends meetings of clubs or organizations: Not on file     Relationship status: Not on file    Intimate partner violence     Fear of current or ex partner: Not on file     Emotionally abused: Not on file     Physically abused: Not on file     Forced sexual activity: Not on file   Other Topics Concern    Not on file   Social History Narrative    Not on file       Family History:   Family History   Problem Relation Age of Onset    Other Mother         had many stomach surgeries    COPD Father     Cancer Father         colon    Other Brother         sepsis    Cancer Maternal Grandfather         colon       Review of Systems  Fever/ chills - no  Chest pain - no  Cough - +  Expectoration / hemoptysis - no  shortness of breath - ++; hypoxia +  Headache - no  Sinus drainage/ sore throat - no  abdominal pain - no  Swelling feet - no  Nausea/ vomiting/ diarrhea/ constipation - no    Physical Exam  Vital Signs: /62   Pulse 63   Temp 97.5 °F (36.4 °C) (Oral)   Resp 18   Ht 5' 2\" (1.575 m)   Wt 157 lb 6.5 oz (71.4 kg)   SpO2 94%   BMI 28.79 kg/m²       Admission Weight: Weight: 155 lb (70.3 kg)    General Appearance: Healthy, alert, active, cooperative, and in no distress  Head: Normocephalic, without obvious abnormality, atraumatic  Neck: no adenopathy, no JVD, supple, symmetrical, trachea midline\"thyroid not enlarged, symmetric, no tenderness/mass/nodule  Lungs: fair air entry bilaterally; breath sounds- vesicular; rhonchi- absent; rales/ crackles- absent  Heart: : regular rate and rhythm, S1, S2 normal, no murmur, click, rub or gallop  Abdomen: soft, non-tender; bowel sounds normal; no masses,  no organomegaly  Extremities: extremities normal, atraumatic, no cyanosis or edema  Skin: skin color, texture, turgor normal. No rashes or lesions  Neurologic: Grossly normal    [unfilled]    Recent labs, Imaging studies reviewed      Data ReviewCBC:   Recent Labs     11/22/20  1552 11/23/20  0547   WBC 6.1 4.5   RBC 3.83* 3.41*   HGB 11.6* 10.5*   HCT 32.9* 29.5*    276     BMP:   Recent Labs     11/22/20  1552 11/23/20  0547   GLUCOSE 114* 141*    139   K 3.5* 4.7   BUN 10 10   CREATININE 0.57 0.53   CALCIUM 8.4* 8.4*     ABGs: No results for input(s): PHART, PO2ART, KIW2QTO, ROF0BSZ, BEART, X1ISVFSI, AFI0EGH in the last 72 hours.    PT/INR:  No results found for: PTINR      ASSESSMENT / PLAN:    COVID pneumonia- steroid; ID consult  Acute hypoxic resp failure - O2/ NIPPV      Electronically signed by Karthik Nation on 11/23/20 at 12:10 PM.

## 2020-11-23 NOTE — CARE COORDINATION
CASE MANAGEMENT NOTE:    Admission Date:  11/22/2020 Baruch Barthel is a 72 y.o.  female    Admitted for : Acute respiratory failure with hypoxia ([facility] [address] [J96.01]    Met with:  Patient, Via Phone    PCP:  Aicha Lorenzo                                Insurance:  Medicare      Current Residence/ Living Arrangements:  independently at home w/             Current Services PTA:  No    Is patient agreeable to VNS: No    Freedom of choice provided:  Yes    List of [address] provided: No    VNS chosen:  No    DME:  walker and shower chair    Home Oxygen: No    Nebulizer: No    CPAP/BIPAP: No    Supplier: N/A    Potential Assistance Needed: Yes, Follow for Oxygen    SNF needed: No    Freedom of choice and list provided: No    Ph[facility] [address]       Does Patient want to use MEDS to BEDS? No    Is patient currently receiving oral anticoagulation therapy? No    Is the Patient an [facility] with Readmission Risk Score greater than 14%? No  If yes, pt needs a follow up appointment made within 7 days. Family Members/Caregivers that pt would like involved in their care:    Yes    If yes, list name here:  , Juvenal Mcneal, Daughter, Thalia [place]    Transportation Provider:  Patient             Is patient in Isolation/One on One/Altered Mental Status? Yes  If yes, skip next question. If no, would they like an I-Pad to  use? NA  If yes, call [phone]. Discharge Plan:  11/23/20 COVID +,(Afebrile, 4LNC) Medicare/Medical [place] Pt. Lives in 2 story home w/ steps, w/ . DME, walker,SC both if needed. Denies VNS. IV Decadron/Remdesivir. ID/Pulmonary, PT/OT. Orange header 12%.  Follow for home Oxygen & all other needs//KB                 Electronically signed by: Chung Flower RN on 11/23/2020 at 1:05 PM

## 2020-11-23 NOTE — PLAN OF CARE
Problem: Gas Exchange - Impaired  Goal: Absence of hypoxia  11/23/2020 1810 by Anita Prieto RN  Outcome: Ongoing   Pt. Maintained SpO2 of greater than 90%. Problem: Breathing Pattern - Ineffective  Goal: Ability to achieve and maintain a regular respiratory rate  11/23/2020 1810 by Anita Prieto RN  Outcome: Ongoing   Pt. Able to maintain respiratory rate WNL. Problem:  Body Temperature -  Risk of, Imbalanced  Goal: Ability to maintain a body temperature within defined limits  11/23/2020 1810 by Anita Prieto RN  Outcome: Ongoing

## 2020-11-23 NOTE — PROGRESS NOTES
Physical Therapy  DATE: 2020    NAME: Fernando Ramires  MRN: [id number]   : 1950    Patient not seen this date for Physical Therapy due to:  [] Blood transfusion in progress  [] Hemodialysis  [] Patient Declined  [] Spine Precautions   [] Strict Bedrest  [] Surgery/ Procedure  [] Testing      [x] Other Per RN pt up on her own in the room, although pt is on 4 lts o2 at his time, Pt was tested positive for covid-19 on 20, was at home, but came  To ER due to shortness of breath. Will continue to check the chart, talk to pt's RN, and check the chart tomorrow. [] PT is being discontinued at this time. Patient independent. No further needs. [] PT is being discontinued at this time due to declining physical/ medical status. Therapy is not appropriate at this time.     Olu Leal, PT

## 2020-11-23 NOTE — CONSULTS
[facility] -   Infectious diseases evaluation  admission date 11/22/2020    reason for consultation:   COVID-19 infection    Impression :   Current:  · COVID-19 infection  · Acute respiratory failure with hypoxia  · GERD  · Hyperlipidemia  · Irritable bowel syndrome  · Diverticulosis      Recommendations   · IV remdesivir and Decadron  · Convalescent plasma  · Follow CBC, renal function and liver enzymes  · Follow inflammatory markers and chest x-ray  · Urine for Legionella antigen pending  · Continue supportive care  · Droplet plus isolation        History of Present Illness:   Initial history:  Jenna Newman is a 72y.o.-year-old female presented to hospital with worsening shortness of breath associated with nonproductive cough for 1 week. No alleviating or exacerbating factors. She did have loose stool and decreased appetite, no loss of smell or taste. She was tested positive for Covid on 11/16/2020 11/22/2028 chest x-ray showed multifocal airspace disease  Initial labs on 11/22/2020 showed WBC 6.1, normal renal function, procalcitonin 0.13, C-reactive protein 94, , ferritin 566  Interval changes  11/23/2020   She is on 4 L of oxygen per nasal cannula  Patient Vitals for the past 8 hrs:   BP Temp Temp src Pulse Resp SpO2   11/23/20 1302 117/73 97.6 °F (36.4 °C) Oral 58 18 95 %           I have personally reviewed the past medical history, past surgical history, medications, social history, and family history, and I haveupdated the database accordingly. Allergies:   Nalbuphine; Sulfa antibiotics;  Formaldehyde; Nickel; Morphine and related; and Oxycodone     Review of Systems:     Review of Systems  As per history present illness, other than above 12 systems review negative  Physical Examination :       Physical Exam  Q COVID-19 pandemic my exam was deferred   Past Medical History:     Past Medical History:   Diagnosis Date    Anxiety     Diverticulosis of colon     DJD (degenerative joint disease) 1/7/2015    Family history of colon cancer     Generalized OA 1/7/2015    GERD (gastroesophageal reflux disease)     Hyperlipidemia 1/7/2015    Irritable bowel syndrome     Osteoarthritis        Past Surgical  History:     Past Surgical History:   Procedure Laterality Date    CHOLECYSTECTOMY      COLONOSCOPY  2004    normal    COLONOSCOPY  4/25/14    diverticulosis    COLONOSCOPY N/A 2/9/2018    COLONOSCOPY POLYPECTOMY COLD BIOPSY performed by Alia Shine MD at [address] [place] [address]  02/09/2018    Spasms in the sigmoid colon. No colitis or ileitis seen.     DILATION AND CURETTAGE OF UTERUS  1970    DILATION AND CURETTAGE OF UTERUS  1991    ENDOSCOPY, COLON, DIAGNOSTIC      JOINT REPLACEMENT Right 8/26/2013    hip    TONSILLECTOMY  1954    UPPER GASTROINTESTINAL ENDOSCOPY  2004    normal    UPPER GASTROINTESTINAL ENDOSCOPY  4/25/14    esophagitis with bx       Medications:      vitamin C  500 mg Oral Daily    vitamin D3  400 Units Oral Daily    sodium chloride flush  10 mL Intravenous 2 times per day    enoxaparin  40 mg Subcutaneous Daily    dexamethasone  6 mg Intravenous Daily    pantoprazole  40 mg Oral Daily    remdesivir IVPB  100 mg Intravenous Q24H    sodium chloride  20 mL Intravenous Once    lactobacillus  1 capsule Oral BID WC       Social History:     Social History     Socioeconomic History    Marital status:      Spouse name: Not on file    Number of children: Not on file    Years of education: Not on file    Highest education level: Not on file   Occupational History    Not on file   Social Needs    Financial resource strain: Not hard at all   [employer] insecurity     Worry: Never true     Inability: Never true   [employer] needs     Medical: Not on file     Non-medical: Not on file   Tobacco Use    Smoking status: Former Smoker     Packs/day: 1.50     Years: 20.00     Pack years: 30.00 Last attempt to quit: 1990     Years since quittin.9    Smokeless tobacco: Never Used   Substance and Sexual Activity    Alcohol use: Yes    Drug use: No    Sexual activity: Yes   Lifestyle    Physical activity     Days per week: Not on file     Minutes per session: Not on file    Stress: Not on file   Relationships    Social connections     Talks on phone: Not on file     Gets together: Not on file     Attends Mu-ism service: Not on file     Active member of club or organization: Not on file     Attends meetings of clubs or organizations: Not on file     Relationship status: Not on file    Intimate partner violence     Fear of current or ex partner: Not on file     Emotionally abused: Not on file     Physically abused: Not on file     Forced sexual activity: Not on file   Other Topics Concern    Not on file   Social History Narrative    Not on file       Family History:     Family History   Problem Relation Age of Onset    Other Mother         had many stomach surgeries    COPD Father     Cancer Father         colon    Other Brother         sepsis    Cancer Maternal Grandfather         colon      Medical Decision Making:   I have independently reviewed/ordered the following labs:    CBC with Differential:   Recent Labs     20  1552 20  0547   WBC 6.1 4.5   HGB 11.6* 10.5*   HCT 32.9* 29.5*    276   LYMPHOPCT 21* 18*   MONOPCT 15* 7     BMP:  Recent Labs     20  1552 20  0547    139   K 3.5* 4.7    105   CO2 26 28   BUN 10 10   CREATININE 0.57 0.53   MG 2.1  --      Hepatic Function Panel:   Recent Labs     20  1552 20  0547   PROT 6.3* 5.8*   LABALBU 3.1* 2.9*   BILITOT 0.30 0.22*   ALKPHOS 61 55   ALT 19 17   AST 39* 32*     No results for input(s): RPR in the last 72 hours. No results for input(s): HIV in the last 72 hours. No results for input(s): BC in the last 72 hours.   Lab Results   Component Value Date    CREATININE 0.53 11/23/2020    GLUCOSE 141 11/23/2020       Detailed results: Thank you for allowing us to participate in the care of this patient. Please call with questions. This note is created with the assistance of a speech recognition program.  While intending to generate adocument that actually reflects the content of the visit, the document can still have some errors including those of syntax and sound a like substitutions which may escape proof reading. It such instances, actual meaningcan be extrapolated by contextual diversion.     Jennifer Gardiner MD  Office: [phone]  Perfect serve / office [phone]

## 2020-11-23 NOTE — PROGRESS NOTES
Plasma continues to transfuse. Patients Blood pressure 118/48. Patient states she feels fine. This RN monitoring at bedside.

## 2020-11-23 NOTE — PLAN OF CARE
Problem: Breathing Pattern - Ineffective  Goal: Ability to achieve and maintain a regular respiratory rate  Outcome: Ongoing  Note: Regular respiratory rate maintained throughout this shift. Will continue to monitor. Problem: Body Temperature -  Risk of, Imbalanced  Goal: Ability to maintain a body temperature within defined limits  Outcome: Ongoing  Note: Body temperature within normal limits this shift. Will continue to monitor.

## 2020-11-24 LAB
ABSOLUTE EOS #: 0 K/UL (ref 0–0.4)
ABSOLUTE IMMATURE GRANULOCYTE: ABNORMAL K/UL (ref 0–0.3)
ABSOLUTE LYMPH #: 1.23 K/UL (ref 1–4.8)
ABSOLUTE MONO #: 0.57 K/UL (ref 0.1–1.3)
ALBUMIN SERPL-MCNC: 3 G/DL (ref 3.5–5.2)
ALBUMIN/GLOBULIN RATIO: ABNORMAL (ref 1–2.5)
ALP BLD-CCNC: 57 U/L (ref 35–104)
ALT SERPL-CCNC: 23 U/L (ref 5–33)
ANION GAP SERPL CALCULATED.3IONS-SCNC: 7 MMOL/L (ref 9–17)
AST SERPL-CCNC: 39 U/L
ATYPICAL LYMPHOCYTE ABSOLUTE COUNT: 0.08 K/UL
ATYPICAL LYMPHOCYTES: 1 %
BASOPHILS # BLD: 0 % (ref 0–2)
BASOPHILS ABSOLUTE: 0 K/UL (ref 0–0.2)
BILIRUB SERPL-MCNC: 0.25 MG/DL (ref 0.3–1.2)
BUN BLDV-MCNC: 19 MG/DL (ref 8–23)
BUN/CREAT BLD: ABNORMAL (ref 9–20)
CALCIUM SERPL-MCNC: 8.5 MG/DL (ref 8.6–10.4)
CHLORIDE BLD-SCNC: 104 MMOL/L (ref 98–107)
CO2: 28 MMOL/L (ref 20–31)
CREAT SERPL-MCNC: 0.53 MG/DL (ref 0.5–0.9)
DIFFERENTIAL TYPE: ABNORMAL
EOSINOPHILS RELATIVE PERCENT: 0 % (ref 0–4)
GFR AFRICAN AMERICAN: >60 ML/MIN
GFR NON-AFRICAN AMERICAN: >60 ML/MIN
GFR SERPL CREATININE-BSD FRML MDRD: ABNORMAL ML/MIN/{1.73_M2}
GFR SERPL CREATININE-BSD FRML MDRD: ABNORMAL ML/MIN/{1.73_M2}
GLUCOSE BLD-MCNC: 134 MG/DL (ref 70–99)
HCT VFR BLD CALC: 30.2 % (ref 36–46)
HEMOGLOBIN: 10.5 G/DL (ref 12–16)
IMMATURE GRANULOCYTES: ABNORMAL %
LYMPHOCYTES # BLD: 15 % (ref 24–44)
MCH RBC QN AUTO: 30.2 PG (ref 26–34)
MCHC RBC AUTO-ENTMCNC: 34.6 G/DL (ref 31–37)
MCV RBC AUTO: 87.2 FL (ref 80–100)
MONOCYTES # BLD: 7 % (ref 1–7)
MORPHOLOGY: NORMAL
MYELOCYTES ABSOLUTE COUNT: 0.08 K/UL
MYELOCYTES: 1 %
NRBC AUTOMATED: ABNORMAL PER 100 WBC
PDW BLD-RTO: 12.6 % (ref 11.5–14.9)
PLATELET # BLD: 354 K/UL (ref 150–450)
PLATELET ESTIMATE: ABNORMAL
PMV BLD AUTO: 6.8 FL (ref 6–12)
POTASSIUM SERPL-SCNC: 4.7 MMOL/L (ref 3.7–5.3)
RBC # BLD: 3.47 M/UL (ref 4–5.2)
RBC # BLD: ABNORMAL 10*6/UL
SEG NEUTROPHILS: 76 % (ref 36–66)
SEGMENTED NEUTROPHILS ABSOLUTE COUNT: 6.24 K/UL (ref 1.3–9.1)
SODIUM BLD-SCNC: 139 MMOL/L (ref 135–144)
TOTAL PROTEIN: 5.9 G/DL (ref 6.4–8.3)
WBC # BLD: 8.2 K/UL (ref 3.5–11)
WBC # BLD: ABNORMAL 10*3/UL

## 2020-11-24 PROCEDURE — 6370000000 HC RX 637 (ALT 250 FOR IP): Performed by: INTERNAL MEDICINE

## 2020-11-24 PROCEDURE — 80053 COMPREHEN METABOLIC PANEL: CPT

## 2020-11-24 PROCEDURE — 6360000002 HC RX W HCPCS: Performed by: FAMILY MEDICINE

## 2020-11-24 PROCEDURE — 6370000000 HC RX 637 (ALT 250 FOR IP): Performed by: FAMILY MEDICINE

## 2020-11-24 PROCEDURE — 99232 SBSQ HOSP IP/OBS MODERATE 35: CPT | Performed by: INTERNAL MEDICINE

## 2020-11-24 PROCEDURE — 36415 COLL VENOUS BLD VENIPUNCTURE: CPT

## 2020-11-24 PROCEDURE — 2580000003 HC RX 258: Performed by: FAMILY MEDICINE

## 2020-11-24 PROCEDURE — 85025 COMPLETE CBC W/AUTO DIFF WBC: CPT

## 2020-11-24 PROCEDURE — 2580000003 HC RX 258: Performed by: INTERNAL MEDICINE

## 2020-11-24 PROCEDURE — 2060000000 HC ICU INTERMEDIATE R&B

## 2020-11-24 PROCEDURE — 2500000003 HC RX 250 WO HCPCS: Performed by: INTERNAL MEDICINE

## 2020-11-24 RX ORDER — GUAIFENESIN/DEXTROMETHORPHAN 100-10MG/5
15 SYRUP ORAL EVERY 6 HOURS SCHEDULED
Status: DISCONTINUED | OUTPATIENT
Start: 2020-11-24 | End: 2020-11-25 | Stop reason: HOSPADM

## 2020-11-24 RX ADMIN — ENOXAPARIN SODIUM 40 MG: 40 INJECTION SUBCUTANEOUS at 07:41

## 2020-11-24 RX ADMIN — GUAIFENESIN AND DEXTROMETHORPHAN 15 ML: 100; 10 SYRUP ORAL at 23:09

## 2020-11-24 RX ADMIN — Medication 30 MG: at 13:00

## 2020-11-24 RX ADMIN — REMDESIVIR 100 MG: 100 INJECTION, POWDER, LYOPHILIZED, FOR SOLUTION INTRAVENOUS at 17:28

## 2020-11-24 RX ADMIN — GUAIFENESIN AND DEXTROMETHORPHAN 15 ML: 100; 10 SYRUP ORAL at 17:28

## 2020-11-24 RX ADMIN — OXYCODONE HYDROCHLORIDE AND ACETAMINOPHEN 500 MG: 500 TABLET ORAL at 07:41

## 2020-11-24 RX ADMIN — PANTOPRAZOLE SODIUM 40 MG: 40 TABLET, DELAYED RELEASE ORAL at 07:41

## 2020-11-24 RX ADMIN — CHOLECALCIFEROL TAB 10 MCG (400 UNIT) 400 UNITS: 10 TAB at 07:41

## 2020-11-24 RX ADMIN — Medication 10 ML: at 22:32

## 2020-11-24 RX ADMIN — DEXAMETHASONE SODIUM PHOSPHATE 6 MG: 4 INJECTION, SOLUTION INTRAMUSCULAR; INTRAVENOUS at 17:28

## 2020-11-24 RX ADMIN — HYDROXYZINE HYDROCHLORIDE 25 MG: 25 TABLET, FILM COATED ORAL at 23:09

## 2020-11-24 RX ADMIN — Medication 1 CAPSULE: at 17:28

## 2020-11-24 RX ADMIN — Medication 1 CAPSULE: at 07:41

## 2020-11-24 RX ADMIN — Medication 10 ML: at 07:43

## 2020-11-24 NOTE — PLAN OF CARE
Problem: Body Temperature -  Risk of, Imbalanced  Goal: Ability to maintain a body temperature within defined limits  11/24/2020 0540 by Stephany Montiel RN  Note: Body temperature remains WNL this shift. Will continue to monitor. 11/23/2020 1810 by Lise Hicks RN  Outcome: Ongoing     Problem: Airway Clearance - Ineffective  Goal: Achieve or maintain patent airway  Note: Patent airway maintained throughout this shift. No distress noted. Will continue to monitor.

## 2020-11-24 NOTE — PLAN OF CARE
Problem: Airway Clearance - Ineffective  Goal: Achieve or maintain patent airway  11/24/2020 1839 by Ivette Mims RN  Outcome: Ongoing     Problem: Gas Exchange - Impaired  Goal: Absence of hypoxia  Outcome: Ongoing   Pt. Able to maintain SpO2 of greater than 90% on room air. Problem: Body Temperature -  Risk of, Imbalanced  Goal: Ability to maintain a body temperature within defined limits  11/24/2020 1839 by Ivette Mims RN  Outcome: Ongoing   Pt. Temperature maintains WNL.      Problem: Isolation Precautions - Risk of Spread of Infection  Goal: Prevent transmission of infection  Outcome: Ongoing

## 2020-11-24 NOTE — CARE COORDINATION
DISCHARGE PLANNING NOTE:    Plan is for this patient to return to home. We are following for home O2 - Patient may still need O2 with activity. Currently on room air.      Orange Header - 12%    Electronically signed by Loreto Brody RN on 11/24/2020 at 2:22 PM

## 2020-11-24 NOTE — PROGRESS NOTES
hydroxide, promethazine **OR** ondansetron, albuterol sulfate HFA, hydrOXYzine, sodium chloride, dextromethorphan    Data:     Past Medical History:   has a past medical history of Anxiety, Diverticulosis of colon, DJD (degenerative joint disease), Family history of colon cancer, Generalized OA, GERD (gastroesophageal reflux disease), Hyperlipidemia, Irritable bowel syndrome, and Osteoarthritis. Social History:   reports that she quit smoking about 30 years ago. She has a 30.00 pack-year smoking history. She has never used smokeless tobacco. She reports current alcohol use. She reports that she does not use drugs. Family History:   Family History   Problem Relation Age of Onset    Other Mother         had many stomach surgeries    COPD Father     Cancer Father         colon    Other Brother         sepsis    Cancer Maternal Grandfather         colon       Vitals:  /73   Pulse 58   Temp 97.6 °F (36.4 °C) (Oral)   Resp 18   Ht 5' 2\" (1.575 m)   Wt 157 lb 6.5 oz (71.4 kg)   SpO2 95%   BMI 28.79 kg/m²   Temp (24hrs), Av °F (36.7 °C), Min:97.5 °F (36.4 °C), Max:98.5 °F (36.9 °C)    No results for input(s): POCGLU in the last 72 hours. I/O (24Hr):     Intake/Output Summary (Last 24 hours) at 2020 1921  Last data filed at 2020 1145  Gross per 24 hour   Intake 120 ml   Output 200 ml   Net -80 ml       Labs:  Hematology:  Recent Labs     20  1552 20  0547   WBC 6.1 4.5   RBC 3.83* 3.41*   HGB 11.6* 10.5*   HCT 32.9* 29.5*   MCV 85.9 86.6   MCH 30.2 30.7   MCHC 35.1 35.5   RDW 12.6 12.6    276   MPV 6.6 6.9   CRP 94.6*  --    INR 0.9  --      Chemistry:  Recent Labs     20  1552 20  0547    139   K 3.5* 4.7    105   CO2 26 28   GLUCOSE 114* 141*   BUN 10 10   CREATININE 0.57 0.53   MG 2.1  --    ANIONGAP 11 6*   LABGLOM >60 >60   GFRAA >60 >60   CALCIUM 8.4* 8.4*   TROPHS <6  --      Recent Labs     20  1552 20  0547   PROT 6.3*

## 2020-11-24 NOTE — PROGRESS NOTES
[facility]    Progress Note    11/24/2020    6:15 PM    Name:   Lia Cavazos  MRN:     [id number]     Acct:      [de-identified]   Room:   [address] Day:  2  Admit Date:  11/22/2020  3:33 PM    PCP:   DREW Rodriguez CNP  Code Status:  Full Code    Subjective:     Patient interviewed via telephone  Patient states that she is doing much better today  Was on 4 liters oxygen yesterday  Today on room air  Diarrhea is much improved  She denies any new symptoms   afebrile        Review of Systems:     Constitutional:  negative for chills, fevers, sweats  Respiratory:  negative for  wheezing  Cardiovascular:  negative for chest pain, chest pressure/discomfort, lower extremity edema, palpitations  Gastrointestinal:  negative for abdominal pain, constipation,nausea, vomiting  Neurological:  negative for dizziness, headache    Medications: Allergies:     Allergies   Allergen Reactions    Nalbuphine      Other reaction(s): claustophobia; confusion  Other reaction(s): claustophobia; confusion    Sulfa Antibiotics Hives    Formaldehyde Rash    Nickel Rash    Morphine And Related Other (See Comments)    Oxycodone        Current Meds:   Scheduled Meds:    guaiFENesin-dextromethorphan  15 mL Oral 4 times per day    vitamin C  500 mg Oral Daily    vitamin D3  400 Units Oral Daily    dexamethasone  6 mg Intravenous Daily    sodium chloride flush  10 mL Intravenous 2 times per day    enoxaparin  40 mg Subcutaneous Daily    pantoprazole  40 mg Oral Daily    remdesivir IVPB  100 mg Intravenous Q24H    sodium chloride  20 mL Intravenous Once    lactobacillus  1 capsule Oral BID WC     Continuous Infusions:   PRN Meds: sodium chloride flush, potassium chloride **OR** potassium alternative oral replacement **OR** potassium chloride, magnesium sulfate, acetaminophen **OR** acetaminophen, magnesium hydroxide, promethazine **OR** ondansetron, albuterol sulfate HFA, hydrOXYzine, sodium chloride    Data:     Past Medical History:   has a past medical history of Anxiety, Diverticulosis of colon, DJD (degenerative joint disease), Family history of colon cancer, Generalized OA, GERD (gastroesophageal reflux disease), Hyperlipidemia, Irritable bowel syndrome, and Osteoarthritis. Social History:   reports that she quit smoking about 30 years ago. She has a 30.00 pack-year smoking history. She has never used smokeless tobacco. She reports current alcohol use. She reports that she does not use drugs. Family History:   Family History   Problem Relation Age of Onset    Other Mother         had many stomach surgeries    COPD Father     Cancer Father         colon    Other Brother         sepsis    Cancer Maternal Grandfather         colon       Vitals:  /61   Pulse 55   Temp 97.4 °F (36.3 °C) (Oral)   Resp 18   Ht 5' 2\" (1.575 m)   Wt 156 lb (70.8 kg)   SpO2 93%   BMI 28.53 kg/m²   Temp (24hrs), Av.6 °F (36.4 °C), Min:97.4 °F (36.3 °C), Max:97.9 °F (36.6 °C)    No results for input(s): POCGLU in the last 72 hours. I/O (24Hr):     Intake/Output Summary (Last 24 hours) at 2020 1815  Last data filed at 2020 0641  Gross per 24 hour   Intake 130 ml   Output 650 ml   Net -520 ml       Labs:  Hematology:  Recent Labs     20  1552 20  0547 20  0546   WBC 6.1 4.5 8.2   RBC 3.83* 3.41* 3.47*   HGB 11.6* 10.5* 10.5*   HCT 32.9* 29.5* 30.2*   MCV 85.9 86.6 87.2   MCH 30.2 30.7 30.2   MCHC 35.1 35.5 34.6   RDW 12.6 12.6 12.6    276 354   MPV 6.6 6.9 6.8   CRP 94.6*  --   --    INR 0.9  --   --      Chemistry:  Recent Labs     20  1552 20  0547 20  0546    139 139   K 3.5* 4.7 4.7    105 104   CO2 26 28 28   GLUCOSE 114* 141* 134*   BUN 10 10 19   CREATININE 0.57 0.53 0.53   MG 2.1  --   --    ANIONGAP 11 6* 7*   LABGLOM >60 >60 >60   GFRAA >60 >60 >60   CALCIUM 8.4* 8.4* 8.5*   TROPHS <6  --   -- Recent Labs     11/22/20  1552 11/23/20  0547 11/24/20  0546   PROT 6.3* 5.8* 5.9*   LABALBU 3.1* 2.9* 3.0*   AST 39* 32* 39*   ALT 19 17 23   *  --   --    ALKPHOS 61 55 57   BILITOT 0.30 0.22* 0.25*     ABG:No results found for: POCPH, PHART, PH, POCPCO2, THD3JAS, PCO2, POCPO2, PO2ART, PO2, POCHCO3, JEA1VSO, HCO3, NBEA, PBEA, BEART, BE, THGBART, THB, PLI5OAR, LTLC4HEE, F4OCRCPD, O2SAT, FIO2  No results found for: SPECIAL  No results found for: CULTURE    Radiology:  Xr Chest Portable    Result Date: 11/22/2020  Ill-defined multifocal airspace disease suspicious for pneumonia, possibly an atypical or viral pneumonia. Physical Examination:        General appearance:  alert, cooperative and no distress  Mental Status:  oriented to person, place and time and normal affect  Further examination deferred due to excessive risk conferred by physical examination during a global COVID 19 pandemic in a setting where local and national supplies of personal protective equipment are on shortage. Assessment:        Hospital Problems           Last Modified POA    * (Principal) Acute respiratory failure with hypoxia ([address] 11/22/2020 Yes    Pneumonia due to COVID-19 virus 11/22/2020 Yes    Gastroesophageal reflux disease without esophagitis 11/22/2020 Yes    Elevated LFTs 11/22/2020 Yes          Plan:        1. Wean off oxygen. S/p convalescent plasma. On Remdesivir, Decadron  2. Lovenox 40 mg  3. Protonix  4.  Will discharge once ok with ID and Indy Kingston MD  11/24/2020  6:15 PM

## 2020-11-24 NOTE — FLOWSHEET NOTE
Writer called patient who was appreciative of spiritual care and emotional support. She spoke about her anxiety and being frightened as her breathing got worse. She's also concerned about ongoing health issues that this could cause. She welcomed prayer. 11/23/20 1932   Encounter Summary   Services provided to: Patient   Referral/Consult From: Rounding  (by phone)   Support System Spouse; Children;Family members   Continue Visiting   (11-23-20)   Complexity of Encounter Moderate   Length of Encounter 15 minutes   Spiritual Assessment Completed Yes   Routine   Type Initial   Spiritual/Religion   Type Spiritual support   Assessment Approachable   Intervention Active listening;Explored feelings, thoughts, concerns;Explored coping resources;Prayer;Sustaining presence/ Ministry of presence; Discussed illness/injury and it's impact   Outcome Comfort;Expressed gratitude;Engaged in conversation;Expressed feelings/needs/concerns;Receptive

## 2020-11-24 NOTE — PROGRESS NOTES
Patients oxygen saturation at 99% on 3L/min. Turned down to 2L/min and patient is at 97% via nasal cannula. Tolerating well. Will continue to monitor.

## 2020-11-24 NOTE — PROGRESS NOTES
Pt. O2 decreased down to 1L of O2 from 2L. Pt. SpO2 maintaining 93-94%. Pt. Denies any SOB. Safety maintained, call light in reach. Will continue to monitor.

## 2020-11-25 VITALS
RESPIRATION RATE: 18 BRPM | OXYGEN SATURATION: 93 % | DIASTOLIC BLOOD PRESSURE: 81 MMHG | HEIGHT: 62 IN | TEMPERATURE: 98 F | SYSTOLIC BLOOD PRESSURE: 112 MMHG | BODY MASS INDEX: 28.71 KG/M2 | WEIGHT: 156 LBS | HEART RATE: 52 BPM

## 2020-11-25 LAB
ABSOLUTE BANDS #: 0.21 K/UL (ref 0–1)
ABSOLUTE EOS #: 0 K/UL (ref 0–0.4)
ABSOLUTE IMMATURE GRANULOCYTE: ABNORMAL K/UL (ref 0–0.3)
ABSOLUTE LYMPH #: 2 K/UL (ref 1–4.8)
ABSOLUTE MONO #: 0.84 K/UL (ref 0.1–1.3)
ALBUMIN SERPL-MCNC: 2.8 G/DL (ref 3.5–5.2)
ALBUMIN/GLOBULIN RATIO: ABNORMAL (ref 1–2.5)
ALP BLD-CCNC: 50 U/L (ref 35–104)
ALT SERPL-CCNC: 25 U/L (ref 5–33)
ANION GAP SERPL CALCULATED.3IONS-SCNC: 7 MMOL/L (ref 9–17)
AST SERPL-CCNC: 32 U/L
BANDS: 2 % (ref 0–10)
BASOPHILS # BLD: 0 % (ref 0–2)
BASOPHILS ABSOLUTE: 0 K/UL (ref 0–0.2)
BILIRUB SERPL-MCNC: 0.29 MG/DL (ref 0.3–1.2)
BUN BLDV-MCNC: 18 MG/DL (ref 8–23)
BUN/CREAT BLD: ABNORMAL (ref 9–20)
CALCIUM SERPL-MCNC: 8.6 MG/DL (ref 8.6–10.4)
CHLORIDE BLD-SCNC: 106 MMOL/L (ref 98–107)
CO2: 27 MMOL/L (ref 20–31)
CREAT SERPL-MCNC: 0.61 MG/DL (ref 0.5–0.9)
DIFFERENTIAL TYPE: ABNORMAL
EOSINOPHILS RELATIVE PERCENT: 0 % (ref 0–4)
GFR AFRICAN AMERICAN: >60 ML/MIN
GFR NON-AFRICAN AMERICAN: >60 ML/MIN
GFR SERPL CREATININE-BSD FRML MDRD: ABNORMAL ML/MIN/{1.73_M2}
GFR SERPL CREATININE-BSD FRML MDRD: ABNORMAL ML/MIN/{1.73_M2}
GLUCOSE BLD-MCNC: 109 MG/DL (ref 70–99)
HCT VFR BLD CALC: 33.5 % (ref 36–46)
HEMOGLOBIN: 10.9 G/DL (ref 12–16)
IMMATURE GRANULOCYTES: ABNORMAL %
LYMPHOCYTES # BLD: 19 % (ref 24–44)
MCH RBC QN AUTO: 30.1 PG (ref 26–34)
MCHC RBC AUTO-ENTMCNC: 32.4 G/DL (ref 31–37)
MCV RBC AUTO: 93 FL (ref 80–100)
METAMYELOCYTES ABSOLUTE COUNT: 0.74 K/UL
METAMYELOCYTES: 7 %
MONOCYTES # BLD: 8 % (ref 1–7)
MORPHOLOGY: NORMAL
NRBC AUTOMATED: ABNORMAL PER 100 WBC
PDW BLD-RTO: 13.2 % (ref 11.5–14.9)
PLATELET # BLD: 273 K/UL (ref 150–450)
PLATELET ESTIMATE: ABNORMAL
PMV BLD AUTO: 7.3 FL (ref 6–12)
POTASSIUM SERPL-SCNC: 4.8 MMOL/L (ref 3.7–5.3)
RBC # BLD: 3.6 M/UL (ref 4–5.2)
RBC # BLD: ABNORMAL 10*6/UL
SEG NEUTROPHILS: 64 % (ref 36–66)
SEGMENTED NEUTROPHILS ABSOLUTE COUNT: 6.71 K/UL (ref 1.3–9.1)
SODIUM BLD-SCNC: 140 MMOL/L (ref 135–144)
TOTAL PROTEIN: 5.6 G/DL (ref 6.4–8.3)
WBC # BLD: 10.5 K/UL (ref 3.5–11)
WBC # BLD: ABNORMAL 10*3/UL

## 2020-11-25 PROCEDURE — 36415 COLL VENOUS BLD VENIPUNCTURE: CPT

## 2020-11-25 PROCEDURE — 6370000000 HC RX 637 (ALT 250 FOR IP): Performed by: INTERNAL MEDICINE

## 2020-11-25 PROCEDURE — 80053 COMPREHEN METABOLIC PANEL: CPT

## 2020-11-25 PROCEDURE — 2580000003 HC RX 258: Performed by: FAMILY MEDICINE

## 2020-11-25 PROCEDURE — 85025 COMPLETE CBC W/AUTO DIFF WBC: CPT

## 2020-11-25 PROCEDURE — 6360000002 HC RX W HCPCS: Performed by: FAMILY MEDICINE

## 2020-11-25 PROCEDURE — 6370000000 HC RX 637 (ALT 250 FOR IP): Performed by: FAMILY MEDICINE

## 2020-11-25 RX ORDER — GREEN TEA/HOODIA GORDONII 315-12.5MG
1 CAPSULE ORAL 2 TIMES DAILY
Qty: 60 TABLET | Refills: 0 | Status: SHIPPED | OUTPATIENT
Start: 2020-11-25 | End: 2020-12-25

## 2020-11-25 RX ORDER — GUAIFENESIN/DEXTROMETHORPHAN 100-10MG/5
15 SYRUP ORAL 3 TIMES DAILY PRN
Qty: 120 ML | Refills: 0 | Status: SHIPPED | OUTPATIENT
Start: 2020-11-25 | End: 2020-12-05

## 2020-11-25 RX ORDER — DEXAMETHASONE 6 MG/1
6 TABLET ORAL
Qty: 7 TABLET | Refills: 0 | Status: SHIPPED | OUTPATIENT
Start: 2020-11-25 | End: 2020-12-02

## 2020-11-25 RX ADMIN — GUAIFENESIN AND DEXTROMETHORPHAN 15 ML: 100; 10 SYRUP ORAL at 12:24

## 2020-11-25 RX ADMIN — ENOXAPARIN SODIUM 40 MG: 40 INJECTION SUBCUTANEOUS at 08:27

## 2020-11-25 RX ADMIN — HYDROXYZINE HYDROCHLORIDE 25 MG: 25 TABLET, FILM COATED ORAL at 08:26

## 2020-11-25 RX ADMIN — OXYCODONE HYDROCHLORIDE AND ACETAMINOPHEN 500 MG: 500 TABLET ORAL at 08:26

## 2020-11-25 RX ADMIN — Medication 10 ML: at 08:28

## 2020-11-25 RX ADMIN — GUAIFENESIN AND DEXTROMETHORPHAN 15 ML: 100; 10 SYRUP ORAL at 05:20

## 2020-11-25 RX ADMIN — CHOLECALCIFEROL TAB 10 MCG (400 UNIT) 400 UNITS: 10 TAB at 08:27

## 2020-11-25 RX ADMIN — PANTOPRAZOLE SODIUM 40 MG: 40 TABLET, DELAYED RELEASE ORAL at 08:26

## 2020-11-25 RX ADMIN — Medication 1 CAPSULE: at 08:26

## 2020-11-25 NOTE — PROGRESS NOTES
[facility]    Progress Note    11/25/2020    11:45 AM    Name:   Catalino Gould  MRN:     [id number]     Acct:      [de-identified]   Room:   [address] Day:  3  Admit Date:  11/22/2020  3:33 PM    PCP:   DREW Hayden CNP  Code Status:  Full Code    Subjective:     Patient interviewed via telephone  She is doing well  Not on oxygen anymore  Diarrhea significantly improved  Remdesivir has been discontinued  Afebrile    Review of Systems:     Constitutional:  negative for chills, fevers, sweats  Respiratory:  negative for  wheezing  Cardiovascular:  negative for chest pain, chest pressure/discomfort, lower extremity edema, palpitations  Gastrointestinal:  negative for abdominal pain, constipation,nausea, vomiting  Neurological:  negative for dizziness, headache    Medications: Allergies:     Allergies   Allergen Reactions    Nalbuphine      Other reaction(s): claustophobia; confusion  Other reaction(s): claustophobia; confusion    Sulfa Antibiotics Hives    Formaldehyde Rash    Nickel Rash    Morphine And Related Other (See Comments)    Oxycodone        Current Meds:   Scheduled Meds:    guaiFENesin-dextromethorphan  15 mL Oral 4 times per day    vitamin C  500 mg Oral Daily    vitamin D3  400 Units Oral Daily    dexamethasone  6 mg Intravenous Daily    sodium chloride flush  10 mL Intravenous 2 times per day    enoxaparin  40 mg Subcutaneous Daily    pantoprazole  40 mg Oral Daily    sodium chloride  20 mL Intravenous Once    lactobacillus  1 capsule Oral BID WC     Continuous Infusions:   PRN Meds: sodium chloride flush, potassium chloride **OR** potassium alternative oral replacement **OR** potassium chloride, magnesium sulfate, acetaminophen **OR** acetaminophen, magnesium hydroxide, promethazine **OR** ondansetron, albuterol sulfate HFA, hydrOXYzine, sodium chloride    Data:     Past Medical History:   has a past medical history of Anxiety, Diverticulosis of colon, DJD (degenerative joint disease), Family history of colon cancer, Generalized OA, GERD (gastroesophageal reflux disease), Hyperlipidemia, Irritable bowel syndrome, and Osteoarthritis. Social History:   reports that she quit smoking about 30 years ago. She has a 30.00 pack-year smoking history. She has never used smokeless tobacco. She reports current alcohol use. She reports that she does not use drugs. Family History:   Family History   Problem Relation Age of Onset    Other Mother         had many stomach surgeries    COPD Father     Cancer Father         colon    Other Brother         sepsis    Cancer Maternal Grandfather         colon       Vitals:  /81   Pulse 52   Temp 98 °F (36.7 °C) (Oral)   Resp 18   Ht 5' 2\" (1.575 m)   Wt 156 lb (70.8 kg)   SpO2 93%   BMI 28.53 kg/m²   Temp (24hrs), Av.7 °F (36.5 °C), Min:97.4 °F (36.3 °C), Max:98 °F (36.7 °C)    No results for input(s): POCGLU in the last 72 hours. I/O (24Hr):   No intake or output data in the 24 hours ending 20 1145    Labs:  Hematology:  Recent Labs     20  1552 2047 20  0546 20  0804   WBC 6.1 4.5 8.2 10.5   RBC 3.83* 3.41* 3.47* 3.60*   HGB 11.6* 10.5* 10.5* 10.9*   HCT 32.9* 29.5* 30.2* 33.5*   MCV 85.9 86.6 87.2 93.0   MCH 30.2 30.7 30.2 30.1   MCHC 35.1 35.5 34.6 32.4   RDW 12.6 12.6 12.6 13.2    276 354 273   MPV 6.6 6.9 6.8 7.3   CRP 94.6*  --   --   --    INR 0.9  --   --   --      Chemistry:  Recent Labs     20  1552 20  0547 20  0546 20  0804    139 139 140   K 3.5* 4.7 4.7 4.8    105 104 106   CO2 26 28 28 27   GLUCOSE 114* 141* 134* 109*   BUN 10 10 19 18   CREATININE 0.57 0.53 0.53 0.61   MG 2.1  --   --   --    ANIONGAP 11 6* 7* 7*   LABGLOM >60 >60 >60 >60   GFRAA >60 >60 >60 >60   CALCIUM 8.4* 8.4* 8.5* 8.6   TROPHS <6  --   --   --      Recent Labs     20  1552 20  0547 20  0546 11/25/20  0804   PROT 6.3* 5.8* 5.9* 5.6*   LABALBU 3.1* 2.9* 3.0* 2.8*   AST 39* 32* 39* 32*   ALT 19 17 23 25   *  --   --   --    ALKPHOS 61 55 57 50   BILITOT 0.30 0.22* 0.25* 0.29*     ABG:No results found for: POCPH, PHART, PH, POCPCO2, WIM6GJX, PCO2, POCPO2, PO2ART, PO2, POCHCO3, JLE9DWK, HCO3, NBEA, PBEA, BEART, BE, THGBART, THB, MMY2VCN, CFKQ1UGI, F5NGBLVO, O2SAT, FIO2  No results found for: SPECIAL  No results found for: CULTURE    Radiology:  Xr Chest Portable    Result Date: 11/22/2020  Ill-defined multifocal airspace disease suspicious for pneumonia, possibly an atypical or viral pneumonia. Physical Examination:        General appearance:  alert, cooperative and no distress  Mental Status:  oriented to person, place and time and normal affect  Further examination deferred due to excessive risk conferred by physical examination during a global COVID 19 pandemic in a setting where local and national supplies of personal protective equipment are on shortage. Assessment:        Hospital Problems           Last Modified POA    * (Principal) Acute respiratory failure with hypoxia ([address] 11/22/2020 Yes    Pneumonia due to COVID-19 virus 11/22/2020 Yes    Gastroesophageal reflux disease without esophagitis 11/22/2020 Yes    Elevated LFTs 11/22/2020 Yes          Plan:        1. S/p convalescent plasma. Remdesivir has been discontinued. Will discharge patient on oral decadron.       Yehuda Teran MD  11/25/2020  11:45 AM

## 2020-11-25 NOTE — PROGRESS NOTES
Discharge teaching and instructions for diagnosis/procedure of COVID-19 completed with patient using teachback method. AVS reviewed. Patient voiced understanding regarding prescriptions, follow up appointments, and care of self at home.  Discharged in a wheelchair to  home with support per family

## 2020-11-25 NOTE — PLAN OF CARE
Problem: Airway Clearance - Ineffective  Goal: Achieve or maintain patent airway  11/25/2020 0107 by Fili Escobar RN  Outcome: Ongoing  11/24/2020 1839 by Odette Higuera RN  Outcome: Ongoing     Problem: Gas Exchange - Impaired  Goal: Absence of hypoxia  11/25/2020 0107 by Fili Escobar RN  Outcome: Ongoing  11/24/2020 1839 by Odette Higuera RN  Outcome: Ongoing  Goal: Promote optimal lung function  Outcome: Ongoing     Problem: Breathing Pattern - Ineffective  Goal: Ability to achieve and maintain a regular respiratory rate  Outcome: Ongoing     Problem:  Body Temperature -  Risk of, Imbalanced  Goal: Ability to maintain a body temperature within defined limits  11/25/2020 0107 by Fili Escobar RN  Outcome: Ongoing  11/24/2020 1839 by Odette Higuera RN  Outcome: Ongoing  Goal: Will regain or maintain usual level of consciousness  Outcome: Ongoing  Goal: Complications related to the disease process, condition or treatment will be avoided or minimized  Outcome: Ongoing     Problem: Isolation Precautions - Risk of Spread of Infection  Goal: Prevent transmission of infection  11/25/2020 0107 by Fili Escobar RN  Outcome: Ongoing  11/24/2020 1839 by Odette Higuera RN  Outcome: Ongoing     Problem: Nutrition Deficits  Goal: Optimize nutrtional status  Outcome: Ongoing     Problem: Risk for Fluid Volume Deficit  Goal: Maintain normal heart rhythm  Outcome: Ongoing  Goal: Maintain absence of muscle cramping  Outcome: Ongoing  Goal: Maintain normal serum potassium, sodium, calcium, phosphorus, and pH  Outcome: Ongoing     Problem: Loneliness or Risk for Loneliness  Goal: Demonstrate positive use of time alone when socialization is not possible  Outcome: Ongoing     Problem: Fatigue  Goal: Verbalize increase energy and improved vitality  Outcome: Ongoing     Problem: Patient Education: Go to Patient Education Activity  Goal: Patient/Family Education  Outcome: Ongoing     Problem: Falls - Risk of:  Goal: Will remain free from falls  Description: Will remain free from falls  Outcome: Ongoing  Goal: Absence of physical injury  Description: Absence of physical injury  Outcome: Ongoing

## 2020-11-25 NOTE — PROGRESS NOTES
PULMONARY PROGRESS NOTE:    REASON FOR VISIT:COVID pneumonia  Interval History:    Shortness of Breath: no  Cough: ++  Sputum: no          Hemoptysis: no  Chest Pain: no  Fever: no                   Swelling Feet: no  Headache: no                                           Nausea, Emesis, Abdominal Pain: no  Diarrhea: no         Constipation: no    Events since last visit: 97% on RA.  Home o2 eval done with activity and will not need oxygen     PAST MEDICAL HISTORY:      Scheduled Meds:   guaiFENesin-dextromethorphan  15 mL Oral 4 times per day    vitamin C  500 mg Oral Daily    vitamin D3  400 Units Oral Daily    dexamethasone  6 mg Intravenous Daily    sodium chloride flush  10 mL Intravenous 2 times per day    enoxaparin  40 mg Subcutaneous Daily    pantoprazole  40 mg Oral Daily    sodium chloride  20 mL Intravenous Once    lactobacillus  1 capsule Oral BID WC     Continuous Infusions:  PRN Meds:sodium chloride flush, potassium chloride **OR** potassium alternative oral replacement **OR** potassium chloride, magnesium sulfate, acetaminophen **OR** acetaminophen, magnesium hydroxide, promethazine **OR** ondansetron, albuterol sulfate HFA, hydrOXYzine, sodium chloride        PHYSICAL EXAMINATION:  /81   Pulse 52   Temp 98 °F (36.7 °C) (Oral)   Resp 18   Ht 5' 2\" (1.575 m)   Wt 156 lb (70.8 kg)   SpO2 93%   BMI 28.53 kg/m²     General : Awake, alert, oriented x 3   Neck - supple, no lymphadenopathy, JVD not raised  Heart - regular rhythm, S1 and S2 normal; no additional sounds heard  Lungs - Air Entry- fair bilaterally; breath sounds : vesicular;   rales/crackles - absent  Abdomen - soft, no tenderness  Upper Extremities  - no cyanosis, mottling; edema : absent  Lower Extremities: no cyanosis, mottling; edema : absent    Current Laboratory, Radiologic, Microbiologic, and Diagnostic studies reviewed  Data ReviewCBC:   Recent Labs     11/23/20  0547 11/24/20  0546 11/25/20  0804   WBC 4.5 8.2 10.5   RBC 3.41* 3.47* 3.60*   HGB 10.5* 10.5* 10.9*   HCT 29.5* 30.2* 33.5*    354 273     BMP:   Recent Labs     11/23/20  0547 11/24/20  0546 11/25/20  0804   GLUCOSE 141* 134* 109*    139 140   K 4.7 4.7 4.8   BUN 10 19 18   CREATININE 0.53 0.53 0.61   CALCIUM 8.4* 8.5* 8.6     ABGs: No results for input(s): PHART, PO2ART, NKW5UYX, MIR6WDE, BEART, L2XJLGQZ, PWA7WIW in the last 72 hours. PT/INR:  No results found for: PTINR    ASSESSMENT / PLAN:  COVID pneumonia - decadron, remdesivir  Hypoxic resp failure - on RA  Cough suppressant - scheduled  No objection to discharge- home O2 eval done at rest and with activity and will not require oxygen.    Follow up in office in 1 month   Discussed with Dr. Ozzy Anthony     Electronically signed by Radha Brody on 11/25/20

## 2020-11-25 NOTE — CARE COORDINATION
DISCHARGE PLANNING NOTE:       Plan is for this patient to return to home.     We are following for home O2 - Patient is on room air currently, however we are following for O2 needs with activity.      [place] - 10%    Possible discharge home later today.      Electronically signed by Jacob Ramirez RN on 11/25/2020 at 10:11 AM

## 2020-11-25 NOTE — DISCHARGE INSTR - DIET

## 2020-11-25 NOTE — PROGRESS NOTES
Home Oxygen Evaluation    Home Oxygen Evaluation completed. Patient is on room air.   Resting SpO2 = 94%  Resting SpO2 on room air = 94%    SpO2 on room air with exercise = 92%      Heide Foster  12:12 PM

## 2020-11-26 NOTE — DISCHARGE SUMMARY
[facility]    Discharge Summary     Patient ID: Deborah Tucker  :  1950   MRN: [id number]     ACCOUNT:  [de-identified]   Patient's PCP: DREW Singleton CNP  Admit Date: 2020   Discharge Date: 2020   Length of Stay: 3  Code Status:  Prior  Admitting Physician: Michael Rowe MD  Discharge Physician: Elina Nieves MD     Active Discharge Diagnoses:     Hospital Problem Lists:  Principal Problem:    Acute respiratory failure with hypoxia [facility])  Active Problems:    Pneumonia due to COVID-19 virus    Gastroesophageal reflux disease without esophagitis    Elevated LFTs  Resolved Problems:    * No resolved hospital problems. *      Admission Condition:  poor     Discharged Condition: good    Hospital Stay:     Hospital Course:  Deborah Tucker is a 72 y.o. female who was admitted for the management of   Acute respiratory failure with hypoxia ([address] , presented to ER with Cough and Shortness of Breath  patient was found to be COVID positive. Was placed on Remdesivir, decadron and oxygen. Also received Convalescent plasma. Patient responded well to treatment, oxygen was weaned off and she was discharged home in stable condition. Significant Diagnostic Studies:   Radiology:  Xr Chest Portable    Result Date: 2020  Ill-defined multifocal airspace disease suspicious for pneumonia, possibly an atypical or viral pneumonia. Consultations:    Consults:     Final Specialist Recommendations/Findings:   IP CONSULT TO PRIMARY CARE PROVIDER  IP CONSULT TO PULMONOLOGY  IP CONSULT TO INFECTIOUS DISEASES      The patient was seen and examined on day of discharge and this discharge summary is in conjunction with any daily progress note from day of discharge.     Discharge plan:     Disposition: Home    Physician Follow Up:     Ellen Jackson MD  [address]  [phone]    Schedule an appointment as soon as possible for a [phone]  [address] [facility] [address]    Phone:  [phone]   · dexamethasone 6 MG tablet  · guaiFENesin-dextromethorphan 100-10 MG/5ML syrup  · Probiotic Acidophilus Tabs         No discharge procedures on file. Time Spent on discharge is  35 mins in patient examination, evaluation, counseling as well as medication reconciliation, prescriptions for required medications, discharge plan and follow up. Electronically signed by   Radha Rodriguez MD  11/25/2020  10:25 PM      Thank you Dr. Go Luke, APRN - CNP for the opportunity to be involved in this patient's care.

## 2020-11-27 ENCOUNTER — CARE COORDINATION (OUTPATIENT)
Dept: CASE MANAGEMENT | Age: 70
End: 2020-11-27

## 2020-11-27 PROCEDURE — 1111F DSCHRG MED/CURRENT MED MERGE: CPT

## 2020-11-27 NOTE — CARE COORDINATION
[facility] Transitions Initial Follow Up Call    Call within 2 business days of discharge: Yes    Patient: Brunilda Cantu Patient : 1950   MRN: [id number]  Reason for Admission: cough/sob  Discharge Date: 20 RARS: Readmission Risk Score: 11      Last Discharge [facility]       Complaint Diagnosis Description Type Department Provider    20 Cough; Shortness of Breath COVID-19 virus infection . .. ED to Hosp-Admission (Discharged) (ADMITTED) Jani Glover MD; Billie Loob. Spoke with:  lucian    Facility: [facility]    Non-face-to-face services provided:  Writer spoke to patient, she stated she is feeling a little better still has the cough but no fevers, reviewed discharge medications and instructions, 1111F order completed, patient stated she will call and schedule her follow up appointments herself, reviewed and updated healthcare decision makers, reviewed covid precautions, patient agreed to covid loop, no vns at this time, writer provided contact information, instructed patient to call with any needs, patient v/u    Challenges to be reviewed by the provider   Additional needs identified to be addressed with provider No  none    Discussed COVID-19 related testing which was available at this time. Test results were positive. Patient informed of results, if available? Yes         Method of communication with provider : none    Advance Care Planning:   Does patient have an Advance Directive:  decision maker updated. Was this a readmission? No  Patient stated reason for admission: covid  Patients top risk factors for readmission: medical condition    Care Transition Nurse (CTN) contacted the patient by telephone to perform post hospital discharge assessment. Verified name and  with patient as identifiers. Provided introduction to self, and explanation of the CTN role.      CTN reviewed discharge instructions, medical action plan and red flags with patient who verbalized understanding. Patient given an opportunity to ask questions and does not have any further questions or concerns at this time. Were discharge instructions available to patient? Yes. Reviewed appropriate site of care based on symptoms and resources available to patient including: PCP, Specialist, After hours contact number-covid loop, Urgent care clinics, When to call 911 and CTN. The patient agrees to contact the PCP office for questions related to their healthcare. Medication reconciliation was performed with patient, who verbalizes understanding of administration of home medications. Advised obtaining a 90-day supply of all daily and as-needed medications. Covid Risk Education    Patient has following risk factors of: no known risk factors. Education provided regarding infection prevention, and signs and symptoms of COVID-19 and when to seek medical attention with patient who verbalized understanding. Discussed exposure protocols and quarantine From CDC: Are you at higher risk for severe illness?   and given an opportunity for questions and concerns. The patient agrees to contact the COVID-19 hotline [phone] or PCP office for questions related to COVID-19. For more information on steps you can take to protect yourself, see CDC's How to Protect Yourself     Patient/family/caregiver given information for Jazmin Mackey and agrees to enroll yes  Patient's preferred e-mail: [email or web address]  Patient's preferred phone number: [phone]    Discussed follow-up appointments. If no appointment was previously scheduled, appointment scheduling offered: Yes. Is follow up appointment scheduled within 7 days of discharge? No-patient stated she would call and schedule herself  Non-[facility] follow up appointment(s):     covid loop based on severity of symptoms and risk factors. Plan for next call: ozzy  CTN provided contact information for future needs.         Care Transitions 24 Hour Call    Care Transitions Interventions         Follow Up  Future Appointments   Date Time Provider Chadwick Lamb   4/7/2021 11:00 AM Kelsey Chou, APRN - [address] [facility] Velvet Gamboa RN

## 2020-11-30 ENCOUNTER — TELEMEDICINE (OUTPATIENT)
Dept: FAMILY MEDICINE CLINIC | Age: 70
End: 2020-11-30
Payer: MEDICARE

## 2020-11-30 LAB
BLD PROD TYP BPU: NORMAL
DISPENSE STATUS BLOOD BANK: NORMAL
TRANSFUSION STATUS: NORMAL
UNIT DIVISION: 0
UNIT NUMBER: NORMAL

## 2020-11-30 PROCEDURE — 99443 PR PHYS/QHP TELEPHONE EVALUATION 21-30 MIN: CPT | Performed by: NURSE PRACTITIONER

## 2020-11-30 NOTE — PROGRESS NOTES
department in the past 7 days or scheduled within the next 24 hours.     Patient identification was verified at the start of the visit: Yes    Total Time: minutes: 21-30 minutes    Note: not billable if this call serves to triage the patient into an appointment for the relevant concern      [place] Officer

## 2020-12-02 ENCOUNTER — TELEPHONE (OUTPATIENT)
Dept: FAMILY MEDICINE CLINIC | Age: 70
End: 2020-12-02

## 2020-12-02 RX ORDER — AZITHROMYCIN 250 MG/1
TABLET, FILM COATED ORAL
Qty: 1 PACKET | Refills: 0 | Status: SHIPPED | OUTPATIENT
Start: 2020-12-02 | End: 2020-12-12

## 2021-01-06 DIAGNOSIS — F41.9 ANXIETY: ICD-10-CM

## 2021-01-06 RX ORDER — HYDROXYZINE HYDROCHLORIDE 25 MG/1
25 TABLET, FILM COATED ORAL 3 TIMES DAILY PRN
Qty: 30 TABLET | Refills: 0 | Status: SHIPPED | OUTPATIENT
Start: 2021-01-06 | End: 2021-01-16

## 2021-01-06 NOTE — TELEPHONE ENCOUNTER
Next Visit Date:  Future Appointments   Date Time Provider Chadwick Lamb   4/7/2021 11:00 AM Lindsay Shetty, APRN - [address] Maintenance   Topic Date Due    DTaP/Tdap/Td vaccine (1 - Tdap) 07/23/1970    Diabetes screen  07/23/1991    Pneumococcal 65+ years Vaccine (2 of 2 - PPSV23) 12/23/2020    Annual Wellness Visit (AWV)  09/24/2021    Breast cancer screen  08/19/2022    Lipid screen  09/16/2025    Colon cancer screen colonoscopy  02/09/2028    DEXA (modify frequency per FRAX score)  Completed    Flu vaccine  Completed    Shingles Vaccine  Completed    Hepatitis C screen  Completed    Hepatitis A vaccine  Aged Out    Hepatitis B vaccine  Aged Out    Hib vaccine  Aged Out    Meningococcal (ACWY) vaccine  Aged Out       No results found for: LABA1C          ( goal A1C is < 7)   No results found for: LABMICR  LDL Cholesterol (mg/dL)   Date Value   09/16/2020 98   09/19/2018 83     LDL Calculated (mg/dL)   Date Value   06/17/2015 75       (goal LDL is <100)   AST (U/L)   Date Value   11/25/2020 32 (H)     ALT (U/L)   Date Value   11/25/2020 25     BUN (mg/dL)   Date Value   11/25/2020 18     BP Readings from Last 3 Encounters:   11/25/20 112/81   09/23/20 120/74   07/20/20 130/80          (goal 120/80)    All Future Testing planned in [facility]              Patient Active Problem List:     Abdominal pain, RUQ     Diverticulosis of colon     Pneumonia     Osteopenia     Hyperlipidemia     DJD (degenerative joint disease)     Generalized OA     Irritable bowel syndrome without diarrhea     Non-intractable cyclical vomiting with nausea     Multinodular goiter     Nausea     Family history of colon cancer     Pneumonia due to COVID-19 virus     Acute respiratory failure with hypoxia (HCC)     Gastroesophageal reflux disease without esophagitis     Elevated LFTs

## 2021-04-07 ENCOUNTER — OFFICE VISIT (OUTPATIENT)
Dept: FAMILY MEDICINE CLINIC | Age: 71
End: 2021-04-07
Payer: MEDICARE

## 2021-04-07 VITALS
HEART RATE: 76 BPM | TEMPERATURE: 97.5 F | BODY MASS INDEX: 29.33 KG/M2 | DIASTOLIC BLOOD PRESSURE: 72 MMHG | SYSTOLIC BLOOD PRESSURE: 122 MMHG | HEIGHT: 62 IN | WEIGHT: 159.4 LBS

## 2021-04-07 DIAGNOSIS — M34.9 SCLERODERMA (HCC): ICD-10-CM

## 2021-04-07 DIAGNOSIS — D64.9 ANEMIA, UNSPECIFIED TYPE: ICD-10-CM

## 2021-04-07 DIAGNOSIS — Z13.1 SCREENING FOR DIABETES MELLITUS (DM): ICD-10-CM

## 2021-04-07 DIAGNOSIS — E78.2 MIXED HYPERLIPIDEMIA: ICD-10-CM

## 2021-04-07 DIAGNOSIS — R73.01 IFG (IMPAIRED FASTING GLUCOSE): Primary | ICD-10-CM

## 2021-04-07 DIAGNOSIS — K21.9 GASTROESOPHAGEAL REFLUX DISEASE WITHOUT ESOPHAGITIS: ICD-10-CM

## 2021-04-07 PROCEDURE — 99214 OFFICE O/P EST MOD 30 MIN: CPT | Performed by: NURSE PRACTITIONER

## 2021-04-07 PROCEDURE — 4040F PNEUMOC VAC/ADMIN/RCVD: CPT | Performed by: NURSE PRACTITIONER

## 2021-04-07 PROCEDURE — 1123F ACP DISCUSS/DSCN MKR DOCD: CPT | Performed by: NURSE PRACTITIONER

## 2021-04-07 PROCEDURE — G8427 DOCREV CUR MEDS BY ELIG CLIN: HCPCS | Performed by: NURSE PRACTITIONER

## 2021-04-07 PROCEDURE — 1036F TOBACCO NON-USER: CPT | Performed by: NURSE PRACTITIONER

## 2021-04-07 PROCEDURE — 3017F COLORECTAL CA SCREEN DOC REV: CPT | Performed by: NURSE PRACTITIONER

## 2021-04-07 PROCEDURE — 1090F PRES/ABSN URINE INCON ASSESS: CPT | Performed by: NURSE PRACTITIONER

## 2021-04-07 PROCEDURE — G8399 PT W/DXA RESULTS DOCUMENT: HCPCS | Performed by: NURSE PRACTITIONER

## 2021-04-07 PROCEDURE — G8417 CALC BMI ABV UP PARAM F/U: HCPCS | Performed by: NURSE PRACTITIONER

## 2021-04-07 SDOH — ECONOMIC STABILITY: TRANSPORTATION INSECURITY
IN THE PAST 12 MONTHS, HAS LACK OF TRANSPORTATION KEPT YOU FROM MEETINGS, WORK, OR FROM GETTING THINGS NEEDED FOR DAILY LIVING?: NO

## 2021-04-07 ASSESSMENT — PATIENT HEALTH QUESTIONNAIRE - PHQ9
1. LITTLE INTEREST OR PLEASURE IN DOING THINGS: 0
SUM OF ALL RESPONSES TO PHQ QUESTIONS 1-9: 0
SUM OF ALL RESPONSES TO PHQ QUESTIONS 1-9: 0
SUM OF ALL RESPONSES TO PHQ9 QUESTIONS 1 & 2: 0
2. FEELING DOWN, DEPRESSED OR HOPELESS: 0

## 2021-04-07 ASSESSMENT — ENCOUNTER SYMPTOMS
SHORTNESS OF BREATH: 0
WHEEZING: 0
VOMITING: 0
ABDOMINAL PAIN: 0
NAUSEA: 0

## 2021-04-07 NOTE — PROGRESS NOTES
Subjective:      Patient ID: Augustin Lawrence is a 72 y.o. female. Visit Information    Have you changed or started any medications since your last visit including any over-the-counter medicines, vitamins, or herbal medicines? no   Are you having any side effects from any of your medications? -  no  Have you stopped taking any of your medications? Is so, why? -  no    Have you seen any other physician or provider since your last visit? Yes - Records Obtained  Have you had any other diagnostic tests since your last visit? Yes - Records Obtained  Have you been seen in the emergency room and/or had an admission to a hospital since we last saw you? No  Have you had your routine dental cleaning in the past 6 months? yes     Have you activated your [employer] account? If not, what are your barriers?  Yes     Patient Care Team:  DREW Durán CNP as PCP - General (Family Nurse Practitioner)  DREW Durán CNP as PCP - [facility]  Amanda Knowles MD as Consulting Physician (Gastroenterology)  Simi Steinberg MD as Consulting Physician (Rheumatology)  Chandler Cortez MD (Dermatology)  Josafat Martínez MD as Consulting Physician (Allergy & Immunology)    Medical History Review  Past Medical, Family, and Social History reviewed and does contribute to the patient presenting condition    Health Maintenance   Topic Date Due    DTaP/Tdap/Td vaccine (1 - Tdap) Never done    Diabetes screen  Never done   [employer] Visit (AWV)  09/24/2021    Breast cancer screen  08/19/2022    Lipid screen  09/16/2025    Colon cancer screen colonoscopy  02/09/2028    DEXA (modify frequency per FRAX score)  Completed    Flu vaccine  Completed    Shingles Vaccine  Completed    Pneumococcal 65+ years Vaccine  Completed    COVID-19 Vaccine  Completed    Hepatitis C screen  Completed    Hepatitis A vaccine  Aged Out    Hepatitis B vaccine  Aged Out    Hib vaccine  Aged Out    Meningococcal (ACWY) vaccine  Aged Out     HPI    72year old female presents with management of IFG HLD anemia GERD. Noted to have elevated fasting glucose and has been on diet measures. States she watches diet and stays active. Currently is not on statin therapy. Also noted to have anemia per chart review. Is on PPI therapy for acid reflux and it works well. Pt had covid 19 in nov and states she has been doing well. Hx of scleroderma and follows up with Dr. Lin Parish. The condition has been stable. Review of Systems   Constitutional: Negative for chills and fever. Respiratory: Negative for shortness of breath and wheezing. Cardiovascular: Negative for chest pain and leg swelling. Gastrointestinal: Negative for abdominal pain, nausea and vomiting. Neurological: Negative for dizziness, weakness and numbness. Psychiatric/Behavioral: Negative for agitation and behavioral problems. The patient is nervous/anxious. Objective:   Physical Exam  Vitals signs and nursing note reviewed. Constitutional:       General: She is not in acute distress. Appearance: Normal appearance. HENT:      Nose: Nose normal.   Eyes:      Conjunctiva/sclera: Conjunctivae normal.   Neck:      Musculoskeletal: Neck supple. Cardiovascular:      Rate and Rhythm: Normal rate and regular rhythm. Heart sounds: Normal heart sounds. Pulmonary:      Effort: Pulmonary effort is normal. No respiratory distress. Breath sounds: Normal breath sounds. Abdominal:      Palpations: Abdomen is soft. Tenderness: There is no abdominal tenderness. Musculoskeletal: Normal range of motion. Lymphadenopathy:      Cervical: No cervical adenopathy. Skin:     General: Skin is warm and dry. Neurological:      Mental Status: She is alert and oriented to person, place, and time. Cranial Nerves: No cranial nerve deficit.    Psychiatric:         Mood and Affect: Mood normal.         Behavior: Behavior normal.         Assessment: 1. IFG (impaired fasting glucose)    2. Mixed hyperlipidemia    3. Anemia, unspecified type    4. Gastroesophageal reflux disease without esophagitis    5. Scleroderma ([facility]    6. Screening for diabetes mellitus (DM)            Plan:      BP Readings from Last 3 Encounters:   04/07/21 122/72   11/25/20 112/81   09/23/20 120/74     /72   Pulse 76   Temp 97.5 °F (36.4 °C) (Infrared)   Ht 5' 2\" (1.575 m)   Wt 159 lb 6.4 oz (72.3 kg)   BMI 29.15 kg/m²   Lab Results   Component Value Date    WBC 10.5 11/25/2020    HGB 10.9 (L) 11/25/2020    HCT 33.5 (L) 11/25/2020     11/25/2020    CHOL 173 09/16/2020    TRIG 94 09/16/2020    HDL 56 09/16/2020    ALT 25 11/25/2020    AST 32 (H) 11/25/2020     11/25/2020    K 4.8 11/25/2020     11/25/2020    CREATININE 0.61 11/25/2020    BUN 18 11/25/2020    CO2 27 11/25/2020    TSH 3.67 06/17/2015    INR 0.9 11/22/2020     Lab Results   Component Value Date    CALCIUM 8.6 11/25/2020     Lab Results   Component Value Date    LDLCALC 75 06/17/2015    LDLCHOLESTEROL 98 09/16/2020         1. IFG (impaired fasting glucose)  - cont diet measures. - Hemoglobin A1C; Future    2. Mixed hyperlipidemia  - cont diet measures. - Lipid Panel; Future  - Comprehensive Metabolic Panel; Future    3 Anemia, unspecified type  - CBC Auto Differential; Future  - Ferritin; Future  - Iron and TIBC; Future    4. Gastroesophageal reflux disease without esophagitis  - stable. Cont PPI therapy   - Magnesium; Future    5. Scleroderma ([facility]  - stable. Cont to monitor     6. Screening for diabetes mellitus (DM)  - Hemoglobin A1C; Future        Requested Prescriptions      No prescriptions requested or ordered in this encounter       Medications Discontinued During This Encounter   Medication Reason    loratadine (CLARITIN) 10 MG tablet LIST CLEANUP       Discussed use, benefit, and side effects of prescribed medications. Barriers to medication compliance addressed.       All patient questions answered. Pt voiced understanding. Return in about 4 months (around 8/7/2021) for HTN, HLD, IFG.             Leatha Gallegos, APRN - CNP

## 2021-06-04 ENCOUNTER — HOSPITAL ENCOUNTER (OUTPATIENT)
Age: 71
Setting detail: SPECIMEN
Discharge: HOME | End: 2021-06-04
Payer: MEDICARE

## 2021-06-04 DIAGNOSIS — D64.9 ANEMIA, UNSPECIFIED TYPE: ICD-10-CM

## 2021-06-04 DIAGNOSIS — Z13.1 SCREENING FOR DIABETES MELLITUS (DM): ICD-10-CM

## 2021-06-04 DIAGNOSIS — K21.9 GASTROESOPHAGEAL REFLUX DISEASE WITHOUT ESOPHAGITIS: ICD-10-CM

## 2021-06-04 DIAGNOSIS — E78.2 MIXED HYPERLIPIDEMIA: ICD-10-CM

## 2021-06-04 DIAGNOSIS — R73.01 IFG (IMPAIRED FASTING GLUCOSE): ICD-10-CM

## 2021-06-04 LAB
ABSOLUTE EOS #: 0.27 K/UL (ref 0–0.44)
ABSOLUTE IMMATURE GRANULOCYTE: 0.04 K/UL (ref 0–0.3)
ABSOLUTE LYMPH #: 1.89 K/UL (ref 1.1–3.7)
ABSOLUTE MONO #: 1.11 K/UL (ref 0.1–1.2)
ALBUMIN SERPL-MCNC: 4.2 G/DL (ref 3.5–5.2)
ALBUMIN/GLOBULIN RATIO: 1.6 (ref 1–2.5)
ALP BLD-CCNC: 92 U/L (ref 35–104)
ALT SERPL-CCNC: 13 U/L (ref 5–33)
ANION GAP SERPL CALCULATED.3IONS-SCNC: 12 MMOL/L (ref 9–17)
AST SERPL-CCNC: 19 U/L
BASOPHILS # BLD: 0 % (ref 0–2)
BASOPHILS ABSOLUTE: 0.04 K/UL (ref 0–0.2)
BILIRUB SERPL-MCNC: 0.44 MG/DL (ref 0.3–1.2)
BUN BLDV-MCNC: 16 MG/DL (ref 8–23)
BUN/CREAT BLD: NORMAL (ref 9–20)
CALCIUM SERPL-MCNC: 8.7 MG/DL (ref 8.6–10.4)
CHLORIDE BLD-SCNC: 106 MMOL/L (ref 98–107)
CHOLESTEROL/HDL RATIO: 3.2
CHOLESTEROL: 134 MG/DL
CO2: 25 MMOL/L (ref 20–31)
CREAT SERPL-MCNC: 0.58 MG/DL (ref 0.5–0.9)
DIFFERENTIAL TYPE: ABNORMAL
EOSINOPHILS RELATIVE PERCENT: 3 % (ref 1–4)
ESTIMATED AVERAGE GLUCOSE: 108 MG/DL
FERRITIN: 39 UG/L (ref 13–150)
GFR AFRICAN AMERICAN: >60 ML/MIN
GFR NON-AFRICAN AMERICAN: >60 ML/MIN
GFR SERPL CREATININE-BSD FRML MDRD: NORMAL ML/MIN/{1.73_M2}
GFR SERPL CREATININE-BSD FRML MDRD: NORMAL ML/MIN/{1.73_M2}
GLUCOSE BLD-MCNC: 83 MG/DL (ref 70–99)
HBA1C MFR BLD: 5.4 % (ref 4–6)
HCT VFR BLD CALC: 41.7 % (ref 36.3–47.1)
HDLC SERPL-MCNC: 42 MG/DL
HEMOGLOBIN: 13.5 G/DL (ref 11.9–15.1)
IMMATURE GRANULOCYTES: 0 %
IRON SATURATION: 27 % (ref 20–55)
IRON: 81 UG/DL (ref 37–145)
LDL CHOLESTEROL: 67 MG/DL (ref 0–130)
LYMPHOCYTES # BLD: 18 % (ref 24–43)
MAGNESIUM: 2.2 MG/DL (ref 1.6–2.6)
MCH RBC QN AUTO: 29.5 PG (ref 25.2–33.5)
MCHC RBC AUTO-ENTMCNC: 32.4 G/DL (ref 28.4–34.8)
MCV RBC AUTO: 91.2 FL (ref 82.6–102.9)
MONOCYTES # BLD: 11 % (ref 3–12)
NRBC AUTOMATED: 0 PER 100 WBC
PDW BLD-RTO: 12.4 % (ref 11.8–14.4)
PLATELET # BLD: 303 K/UL (ref 138–453)
PLATELET ESTIMATE: ABNORMAL
PMV BLD AUTO: 9.8 FL (ref 8.1–13.5)
POTASSIUM SERPL-SCNC: 3.8 MMOL/L (ref 3.7–5.3)
RBC # BLD: 4.57 M/UL (ref 3.95–5.11)
RBC # BLD: ABNORMAL 10*6/UL
SEG NEUTROPHILS: 68 % (ref 36–65)
SEGMENTED NEUTROPHILS ABSOLUTE COUNT: 7.19 K/UL (ref 1.5–8.1)
SODIUM BLD-SCNC: 143 MMOL/L (ref 135–144)
THYROXINE, FREE: 1.18 NG/DL (ref 0.93–1.7)
TOTAL IRON BINDING CAPACITY: 301 UG/DL (ref 250–450)
TOTAL PROTEIN: 6.8 G/DL (ref 6.4–8.3)
TRIGL SERPL-MCNC: 127 MG/DL
TSH SERPL DL<=0.05 MIU/L-ACNC: 4.86 MIU/L (ref 0.3–5)
UNSATURATED IRON BINDING CAPACITY: 220 UG/DL (ref 112–347)
VLDLC SERPL CALC-MCNC: NORMAL MG/DL (ref 1–30)
WBC # BLD: 10.5 K/UL (ref 3.5–11.3)
WBC # BLD: ABNORMAL 10*3/UL

## 2021-08-09 ENCOUNTER — OFFICE VISIT (OUTPATIENT)
Dept: FAMILY MEDICINE CLINIC | Age: 71
End: 2021-08-09
Payer: MEDICARE

## 2021-08-09 ENCOUNTER — HOSPITAL ENCOUNTER (OUTPATIENT)
Age: 71
Setting detail: SPECIMEN
Discharge: HOME | End: 2021-08-09
Payer: MEDICARE

## 2021-08-09 VITALS
WEIGHT: 158 LBS | SYSTOLIC BLOOD PRESSURE: 129 MMHG | BODY MASS INDEX: 29.08 KG/M2 | HEART RATE: 54 BPM | TEMPERATURE: 97.2 F | DIASTOLIC BLOOD PRESSURE: 70 MMHG | HEIGHT: 62 IN | RESPIRATION RATE: 18 BRPM | OXYGEN SATURATION: 93 %

## 2021-08-09 DIAGNOSIS — R09.81 CONGESTION OF NASAL SINUS: ICD-10-CM

## 2021-08-09 DIAGNOSIS — J20.9 ACUTE BRONCHITIS, UNSPECIFIED ORGANISM: Primary | ICD-10-CM

## 2021-08-09 DIAGNOSIS — Z87.01 HISTORY OF PNEUMONIA: ICD-10-CM

## 2021-08-09 DIAGNOSIS — R09.82 POST-NASAL DRIP: ICD-10-CM

## 2021-08-09 PROBLEM — E04.1 NONTOXIC SINGLE THYROID NODULE: Status: ACTIVE | Noted: 2021-08-09

## 2021-08-09 PROBLEM — R20.9 SKIN SENSATION DISTURBANCE: Status: ACTIVE | Noted: 2021-08-09

## 2021-08-09 PROBLEM — E55.9 VITAMIN D DEFICIENCY: Status: ACTIVE | Noted: 2021-08-09

## 2021-08-09 PROCEDURE — G8427 DOCREV CUR MEDS BY ELIG CLIN: HCPCS | Performed by: NURSE PRACTITIONER

## 2021-08-09 PROCEDURE — G8417 CALC BMI ABV UP PARAM F/U: HCPCS | Performed by: NURSE PRACTITIONER

## 2021-08-09 PROCEDURE — 4040F PNEUMOC VAC/ADMIN/RCVD: CPT | Performed by: NURSE PRACTITIONER

## 2021-08-09 PROCEDURE — 3017F COLORECTAL CA SCREEN DOC REV: CPT | Performed by: NURSE PRACTITIONER

## 2021-08-09 PROCEDURE — G8399 PT W/DXA RESULTS DOCUMENT: HCPCS | Performed by: NURSE PRACTITIONER

## 2021-08-09 PROCEDURE — 1036F TOBACCO NON-USER: CPT | Performed by: NURSE PRACTITIONER

## 2021-08-09 PROCEDURE — 1123F ACP DISCUSS/DSCN MKR DOCD: CPT | Performed by: NURSE PRACTITIONER

## 2021-08-09 PROCEDURE — 1090F PRES/ABSN URINE INCON ASSESS: CPT | Performed by: NURSE PRACTITIONER

## 2021-08-09 PROCEDURE — 99214 OFFICE O/P EST MOD 30 MIN: CPT | Performed by: NURSE PRACTITIONER

## 2021-08-09 RX ORDER — AZITHROMYCIN 250 MG/1
TABLET, FILM COATED ORAL
Qty: 1 PACKET | Refills: 0 | Status: SHIPPED | OUTPATIENT
Start: 2021-08-09 | End: 2021-08-19

## 2021-08-09 ASSESSMENT — ENCOUNTER SYMPTOMS
SHORTNESS OF BREATH: 0
ABDOMINAL PAIN: 0
COUGH: 1
EYE PAIN: 0
SORE THROAT: 0
VOMITING: 0
RHINORRHEA: 0
NAUSEA: 0
CHEST TIGHTNESS: 0

## 2021-08-09 NOTE — PROGRESS NOTES
[address] [facility]  [address] [facility] [place] [id number]  Dept: [phone]  Dept Fax: [phone]    Fernando Ramires is a 80 y.o. female who presents today for her medical conditions/complaints of   Chief Complaint   Patient presents with    Cough    Congestion     pt reports that she would like to get tested for covid. HPI:     /70   Pulse 54   Temp 97.2 °F (36.2 °C) (Temporal)   Resp 18   Ht 5' 2\" (1.575 m)   Wt 158 lb (71.7 kg)   SpO2 93%   BMI 28.90 kg/m²       HPI  Pt presented to the clinic today with c/o congestion. This is a new problem. The current episode started 7 days ago. The problem has been improving since onset. Associated symptoms include: cough- dry, post nasal drip . Pertinent negatives include: No fever, chills, diarrhea, loss of taste, smell, SOB, chest pain, abdominal pain . Pt has tried Mucinex DM, OTC allergy med,  and night time cough and cold with some improvement. Former Smoker. History of allergies. Vaccinated for COVID x2. Had COVID in past.   Concerned for COVID. Past Medical History:   Diagnosis Date    Anxiety     Diverticulosis of colon     DJD (degenerative joint disease) 1/7/2015    Family history of colon cancer     Generalized OA 1/7/2015    GERD (gastroesophageal reflux disease)     Hyperlipidemia 1/7/2015    Irritable bowel syndrome     Osteoarthritis         Past Surgical History:   Procedure Laterality Date    CHOLECYSTECTOMY      COLONOSCOPY  2004    normal    COLONOSCOPY  4/25/14    diverticulosis    COLONOSCOPY N/A 2/9/2018    COLONOSCOPY POLYPECTOMY COLD BIOPSY performed by Biju Zhu MD at [facility]  02/09/2018    Spasms in the sigmoid colon. No colitis or ileitis seen.     DILATION AND CURETTAGE OF UTERUS  1970    DILATION AND CURETTAGE OF UTERUS  1991    ENDOSCOPY, COLON, DIAGNOSTIC      JOINT REPLACEMENT Right 8/26/2013    hip    TONSILLECTOMY      UPPER GASTROINTESTINAL ENDOSCOPY      normal    UPPER GASTROINTESTINAL ENDOSCOPY  14    esophagitis with bx       Family History   Problem Relation Age of Onset    Other Mother         had many stomach surgeries    COPD Father     Cancer Father         colon    Other Brother         sepsis    Cancer Maternal Grandfather         colon       Social History     Tobacco Use    Smoking status: Former Smoker     Packs/day: 1.50     Years: 20.00     Pack years: 30.00     Quit date: 1990     Years since quittin.6    Smokeless tobacco: Never Used   Substance Use Topics    Alcohol use: Yes        Prior to Visit Medications    Medication Sig Taking? Authorizing Provider   azithromycin (ZITHROMAX) 250 MG tablet Take 2 tablets (500 mg) on Day 1, followed by 1 tablet (250 mg) once daily on Days 2 through 5. Yes DREW Schwab CNP   ZINC PO Take by mouth Yes Historical Provider, MD   albuterol sulfate HFA (PROAIR HFA) 108 (90 Base) MCG/ACT inhaler Inhale 2 puffs into the lungs every 6 hours as needed for Wheezing Yes DREW Mcclain CNP   diclofenac sodium (VOLTAREN) 1 % GEL Apply topically 2 times daily Yes DREW Mcclain CNP   vitamin C (ASCORBIC ACID) 500 MG tablet Take 500 mg by mouth daily Yes Historical Provider, MD   pantoprazole (PROTONIX) 40 MG tablet Take 1 tablet by mouth daily Yes DREW Rudd NP   valACYclovir (VALTREX) 1 g tablet Take 1,000 mg by mouth 2 times daily Patient uses PRN Yes Historical Provider, MD   Cyanocobalamin (VITAMIN B-12 CR PO) Take by mouth Yes Historical Provider, MD   Coenzyme Q10 (COQ10 PO) Take by mouth Yes Historical Provider, MD   Multiple Vitamins-Minerals (MULTIVITAMIN PO) Take by mouth Yes Historical Provider, MD   vitamin D (ERGOCALCIFEROL) 400 UNITS CAPS Take 400 Units by mouth daily Yes Historical Provider, MD   Calcium Carbonate-Vitamin D (CALCIUM + D) 600-200 MG-UNIT TABS Take  by mouth.  Yes Historical Provider, MD       Allergies   Allergen Reactions    Nalbuphine      Other reaction(s): claustophobia; confusion  Other reaction(s): claustophobia; confusion    Sulfa Antibiotics Hives    Formaldehyde Rash    Nickel Rash    Morphine And Related Other (See Comments)    Oxycodone          Subjective:      Review of Systems   Constitutional: Negative for chills and fever. HENT: Positive for congestion and postnasal drip. Negative for ear pain, rhinorrhea and sore throat. Eyes: Negative for pain and visual disturbance. Respiratory: Positive for cough. Negative for chest tightness and shortness of breath. Cardiovascular: Negative for chest pain, palpitations and leg swelling. Gastrointestinal: Negative for abdominal pain, nausea and vomiting. Genitourinary: Negative for decreased urine volume and difficulty urinating. Musculoskeletal: Negative for gait problem, myalgias and neck pain. Skin: Negative for pallor and rash. Neurological: Negative for weakness, light-headedness and headaches. Psychiatric/Behavioral: Negative for sleep disturbance. Objective:     Physical Exam  Vitals and nursing note reviewed. Constitutional:       General: She is not in acute distress. Appearance: Normal appearance. HENT:      Head: Normocephalic and atraumatic. Right Ear: Tympanic membrane and ear canal normal.      Left Ear: Tympanic membrane and ear canal normal.      Nose: Congestion present. Mouth/Throat:      Lips: Pink. Mouth: Mucous membranes are moist.      Pharynx: Oropharynx is clear. Uvula midline. No posterior oropharyngeal erythema. Eyes:      Extraocular Movements: Extraocular movements intact. Conjunctiva/sclera: Conjunctivae normal.   Cardiovascular:      Rate and Rhythm: Regular rhythm. Bradycardia present. Pulses: Normal pulses. Pulmonary:      Effort: Pulmonary effort is normal. No tachypnea. Breath sounds: Normal breath sounds. No rales. Abdominal:      General: Bowel sounds are normal.      Palpations: Abdomen is soft. Musculoskeletal:         General: Normal range of motion. Cervical back: Normal range of motion and neck supple. Right lower leg: No edema. Left lower leg: No edema. Skin:     General: Skin is warm and dry. Capillary Refill: Capillary refill takes less than 2 seconds. Findings: No rash. Neurological:      Mental Status: She is alert and oriented to person, place, and time. Coordination: Coordination normal.      Gait: Gait normal.   Psychiatric:         Mood and Affect: Mood normal.         Thought Content: Thought content normal.           MEDICAL DECISION MAKING Assessment/Plan:     Edwin was seen today for cough and congestion. Diagnoses and all orders for this visit:    Acute bronchitis, unspecified organism  -     azithromycin (ZITHROMAX) 250 MG tablet; Take 2 tablets (500 mg) on Day 1, followed by 1 tablet (250 mg) once daily on Days 2 through 5. Congestion of nasal sinus  -     COVID-19; Future    History of pneumonia    Post-nasal drip        Results for orders placed or performed during the hospital encounter of 06/04/21   T4, Free   Result Value Ref Range    Thyroxine, Free 1.18 0.93 - 1.70 ng/dL   TSH without Reflex   Result Value Ref Range    TSH 4.86 0.30 - 5.00 mIU/L     Based on the patient's history and exam will treat as bronchitis. The patient does not have clinical findings suggestive of pneumonia. There is no abnormal vital signs (pulse is not greater than 100/ minute, respirations are not greater than 24/ minute, temperature is not greater than 38 degrees Celsius, or oxygen saturation less than 95 percent) There is no tachypnea, rales, or signs of parenchymal consolidation on exam. There are no changes in mental status or behavioral changes. Pt concerned with history of pneumonia states usually needs antibiotic. Has tessalon pearls at home for cough if needed. Script provided for Z-pack. Continue on OTC meds as you were prior to coming in today as needed. COVID-19 testing collected and sent to the lab. Pt to quarantine as directed in the AVS.   Rest, increase fluids. Return if no improvement in symptoms. Go to the ER for any emergent concern. Preventing the Spread of Coronavirus Disease 2019 in Homes and Residential Communities: For the most recent information go to: [employer]    Patient given educational materials - see patientinstructions. Discussed use, benefit, and side effects of prescribed medications. All patient questions answered. Pt verbalized understanding. Instructed to continue current medications, diet and exercise. Patient agreed with treatment plan. Follow up as directed.      Electronically signed by DREW Gillette CNP on 8/9/2021 at 2:37 PM

## 2021-08-09 NOTE — PATIENT INSTRUCTIONS
Learning About Coronavirus [phone])  Coronavirus [phone]): Overview  What is coronavirus (COVID-19)? The coronavirus disease (COVID-19) is caused by a virus. It is an illness that was first found in [place], [place], in December 2019. It has since spread worldwide. The virus can cause fever, cough, and trouble breathing. In severe cases, it can cause pneumonia and make it hard to breathe without help. It can cause death. Coronaviruses are a large group of viruses. They cause the common cold. They also cause more serious illnesses like [place] East respiratory syndrome (MERS) and severe acute respiratory syndrome (SARS). COVID-19 is caused by a novel coronavirus. That means it's a new type that has not been seen in people before. This virus spreads person-to-person through droplets from coughing and sneezing. It can also spread when you are close to someone who is infected. And it can spread when you touch something that has the virus on it, such as a doorknob or a tabletop. What can you do to protect yourself from coronavirus (COVID-19)? The best way to protect yourself from getting sick is to:  · Avoid areas where there is an outbreak. · Avoid contact with people who may be infected. · Wash your hands often with soap or alcohol-based hand sanitizers. · Avoid crowds and try to stay at least 6 feet away from other people. · Wash your hands often, especially after you cough or sneeze. Use soap and water, and scrub for at least 20 seconds. If soap and water aren't available, use an alcohol-based hand . · Avoid touching your mouth, nose, and eyes. What can you do to avoid spreading the virus to others? To help avoid spreading the virus to others:  · Cover your mouth with a tissue when you cough or sneeze. Then throw the tissue in the trash. · Use a disinfectant to clean things that you touch often. · Wear a cloth face cover if you have to go to public areas.   · Stay home if you are sick or have been exposed to the virus. Don't go to school, work, or public areas. And don't use public transportation. · If you are sick:  ? Leave your home only if you need to get medical care. But call the doctor's office first so they know you're coming. And wear a face cover. ? Wear the face cover whenever you're around other people. It can help stop the spread of the virus when you cough or sneeze. ? Clean and disinfect your home every day. Use household  and disinfectant wipes or sprays. Take special care to clean things that you grab with your hands. These include doorknobs, remote controls, phones, and handles on your refrigerator and microwave. And don't forget countertops, tabletops, bathrooms, and computer keyboards. When to call for help  [id number] anytime you think you may need emergency care. For example, call if:  · You have severe trouble breathing. (You can't talk at all.)  · You have constant chest pain or pressure. · You are severely dizzy or lightheaded. · You are confused or can't think clearly. · Your face and lips have a blue color. · You pass out (lose consciousness) or are very hard to wake up. Call your doctor now if you develop symptoms such as:  · Shortness of breath. · Fever. · Cough. If you need to get care, call ahead to the doctor's office for instructions before you go. Make sure you wear a face cover to prevent exposing other people to the virus. Where can you get the latest information? The following health organizations are tracking and studying this virus. Their websites contain the most up-to-date information. Suri Menon also learn what to do if you think you may have been exposed to the virus. · U.[place]. Centers for Disease Control and Prevention (CDC): The CDC provides updated news about the disease and travel advice. The website also tells you how to prevent the spread of infection.  www.cdc.gov  · World Health Organization [facility]): WHO offers information about the virus outbreaks. WHO also has travel advice. www.who.int  Current as of: April 24, 2020               Content Version: 12.4  © 2006-2020 Healthwise, [employer]. Care instructions adapted under license by your healthcare professional. If you have questions about a medical condition or this instruction, always ask your healthcare professional. [address] any warranty or liability for your use of this information. Coronavirus (OVVPA-11): Care Instructions  Overview  The coronavirus disease (COVID-19) is caused by a virus. It causes a fever, a cough, and shortness of breath. It mainly spreads person-to-person through droplets from coughing and sneezing. The virus also can spread when people are in close contact with someone who is infected. Most people have mild symptoms and can take care of themselves at home. If their symptoms get worse, they may need care in a hospital. There is no medicine to fight the virus. It's important to not spread the virus to others. If you have COVID-19, wear a face cover anytime you are around other people. You need to isolate yourself while you are sick. Your doctor will tell you when you no longer need to be isolated. Leave your home only if you need to get medical care. Follow-up care is a key part of your treatment and safety. Be sure to make and go to all appointments, and call your doctor if you are having problems. It's also a good idea to know your test results and keep a list of the medicines you take. How can you care for yourself at home? · Get extra rest. It can help you feel better. · Drink plenty of fluids. This helps replace fluids lost from fever. Fluids also help ease a scratchy throat. Water, soup, fruit juice, and hot tea with lemon are good choices. · Take acetaminophen (such as Tylenol) to reduce a fever. It may also help with muscle aches. Read and follow all instructions on the label.   · Sponge your body with lukewarm water to help with fever. Don't use cold water or ice. · Use petroleum jelly on sore skin. This can help if the skin around your nose and lips becomes sore from rubbing a lot with tissues. Tips for isolation  · Wear a cloth face cover when you are around other people. It can help stop the spread of the virus when you cough or sneeze. · Limit contact with people in your home. If possible, stay in a separate bedroom and use a separate bathroom. · Avoid contact with pets and other animals. · Cover your mouth and nose with a tissue when you cough or sneeze. Then throw it in the trash right away. · Wash your hands often, especially after you cough or sneeze. Use soap and water, and scrub for at least 20 seconds. If soap and water aren't available, use an alcohol-based hand . · Don't share personal household items. These include bedding, towels, cups and glasses, and eating utensils. · Clean and disinfect your home every day. Use household  and disinfectant wipes or sprays. Take special care to clean things that you grab with your hands. These include doorknobs, remote controls, phones, and handles on your refrigerator and microwave. And don't forget countertops, tabletops, bathrooms, and computer keyboards. When should you call for help? [id number] anytime you think you may need emergency care. For example, call if you have life-threatening symptoms, such as:  · You have severe trouble breathing. (You can't talk at all.)  · You have constant chest pain or pressure. · You are severely dizzy or lightheaded. · You are confused or can't think clearly. · Your face and lips have a blue color. · You pass out (lose consciousness) or are very hard to wake up. Call your doctor now or seek immediate medical care if:  · You have moderate trouble breathing. (You can't speak a full sentence.)  · You are coughing up blood (more than about 1 teaspoon). · You have signs of low blood pressure.  These include feeling lightheaded; being too weak to stand; and having cold, pale, clammy skin. Watch closely for changes in your health, and be sure to contact your doctor if:  · Your symptoms get worse. · You are not getting better as expected. Call before you go to the doctor's office. Follow their instructions. And wear a cloth face cover. Current as of: April 24, 2020               Content Version: 12.4  © 2006-2020 [employer]. Care instructions adapted under license by your healthcare professional. If you have questions about a medical condition or this instruction, always ask your healthcare professional. [address] any warranty or liability for your use of this information. Patient Education        Bronchitis: Care Instructions  Your Care Instructions     Bronchitis is inflammation of the bronchial tubes, which carry air to the lungs. The tubes swell and produce mucus, or phlegm. The mucus and inflamed bronchial tubes make you cough. You may have trouble breathing. Most cases of bronchitis are caused by viruses like those that cause colds. Antibiotics usually do not help and they may be harmful. Bronchitis usually develops rapidly and lasts about 2 to 3 weeks in otherwise healthy people. Follow-up care is a key part of your treatment and safety. Be sure to make and go to all appointments, and call your doctor if you are having problems. It's also a good idea to know your test results and keep a list of the medicines you take. How can you care for yourself at home? · Take all medicines exactly as prescribed. Call your doctor if you think you are having a problem with your medicine. · Get some extra rest.  · Take an over-the-counter pain medicine, such as acetaminophen (Tylenol), ibuprofen (Advil, Motrin), or naproxen (Aleve) to reduce fever and relieve body aches. Read and follow all instructions on the label.   · Do not take two or more pain medicines at the same time unless the doctor told you to. Many pain medicines have acetaminophen, which is Tylenol. Too much acetaminophen (Tylenol) can be harmful. · Take an over-the-counter cough medicine that contains dextromethorphan to help quiet a dry, hacking cough so that you can sleep. Avoid cough medicines that have more than one active ingredient. Read and follow all instructions on the label. · Breathe moist air from a humidifier, hot shower, or sink filled with hot water. The heat and moisture will thin mucus so you can cough it out. · Do not smoke. Smoking can make bronchitis worse. If you need help quitting, talk to your doctor about stop-smoking programs and medicines. These can increase your chances of quitting for good. When should you call for help? Call 911 anytime you think you may need emergency care. For example, call if:    · You have severe trouble breathing. Call your doctor now or seek immediate medical care if:    · You have new or worse trouble breathing.     · You cough up dark brown or bloody mucus (sputum).     · You have a new or higher fever.     · You have a new rash. Watch closely for changes in your health, and be sure to contact your doctor if:    · You cough more deeply or more often, especially if you notice more mucus or a change in the color of your mucus.     · You are not getting better as expected. Where can you learn more? Go to [email or web address] and sign in to your [employer] account. Enter H333 in the Circl box to learn more about \"Bronchitis: Care Instructions. \"     If you do not have an account, please click on the \"Sign Up Now\" link. Current as of: October 26, 2020               Content Version: 12.9  © 6502-3363 Healthwise, [employer]. Care instructions adapted under license by [facility] ([facility]).  If you have questions about a medical condition or this instruction, always ask your healthcare professional. [address] any warranty or liability for your use of this information.

## 2021-08-10 DIAGNOSIS — R09.81 CONGESTION OF NASAL SINUS: ICD-10-CM

## 2021-08-11 LAB
SARS-COV-2: NORMAL
SARS-COV-2: NOT DETECTED
SOURCE: NORMAL

## 2021-09-13 ENCOUNTER — OFFICE VISIT (OUTPATIENT)
Dept: GASTROENTEROLOGY | Age: 71
End: 2021-09-13
Payer: MEDICARE

## 2021-09-13 VITALS
HEIGHT: 62 IN | DIASTOLIC BLOOD PRESSURE: 80 MMHG | HEART RATE: 62 BPM | BODY MASS INDEX: 29.77 KG/M2 | WEIGHT: 161.8 LBS | OXYGEN SATURATION: 95 % | SYSTOLIC BLOOD PRESSURE: 135 MMHG

## 2021-09-13 DIAGNOSIS — M34.9 SCLERODERMA (HCC): ICD-10-CM

## 2021-09-13 DIAGNOSIS — K21.9 GASTROESOPHAGEAL REFLUX DISEASE WITHOUT ESOPHAGITIS: ICD-10-CM

## 2021-09-13 DIAGNOSIS — K57.30 DIVERTICULOSIS OF COLON: Primary | ICD-10-CM

## 2021-09-13 PROCEDURE — G8427 DOCREV CUR MEDS BY ELIG CLIN: HCPCS | Performed by: INTERNAL MEDICINE

## 2021-09-13 PROCEDURE — 1036F TOBACCO NON-USER: CPT | Performed by: INTERNAL MEDICINE

## 2021-09-13 PROCEDURE — 1123F ACP DISCUSS/DSCN MKR DOCD: CPT | Performed by: INTERNAL MEDICINE

## 2021-09-13 PROCEDURE — G8417 CALC BMI ABV UP PARAM F/U: HCPCS | Performed by: INTERNAL MEDICINE

## 2021-09-13 PROCEDURE — 1090F PRES/ABSN URINE INCON ASSESS: CPT | Performed by: INTERNAL MEDICINE

## 2021-09-13 PROCEDURE — 3017F COLORECTAL CA SCREEN DOC REV: CPT | Performed by: INTERNAL MEDICINE

## 2021-09-13 PROCEDURE — 4040F PNEUMOC VAC/ADMIN/RCVD: CPT | Performed by: INTERNAL MEDICINE

## 2021-09-13 PROCEDURE — G8399 PT W/DXA RESULTS DOCUMENT: HCPCS | Performed by: INTERNAL MEDICINE

## 2021-09-13 PROCEDURE — 99213 OFFICE O/P EST LOW 20 MIN: CPT | Performed by: INTERNAL MEDICINE

## 2021-09-13 PROCEDURE — APPSS30 APP SPLIT SHARED TIME 16-30 MINUTES: Performed by: NURSE PRACTITIONER

## 2021-09-13 RX ORDER — PANTOPRAZOLE SODIUM 40 MG/1
40 TABLET, DELAYED RELEASE ORAL DAILY
Qty: 90 TABLET | Refills: 3 | Status: SHIPPED | OUTPATIENT
Start: 2021-09-13

## 2021-09-13 ASSESSMENT — ENCOUNTER SYMPTOMS
BLOOD IN STOOL: 0
COUGH: 1
CHOKING: 0
BACK PAIN: 0
RECTAL PAIN: 0
NAUSEA: 0
ABDOMINAL PAIN: 0
SHORTNESS OF BREATH: 0
ANAL BLEEDING: 0
DIARRHEA: 0
GASTROINTESTINAL NEGATIVE: 1
ABDOMINAL DISTENTION: 0
VOMITING: 0
SORE THROAT: 0
VOICE CHANGE: 0
TROUBLE SWALLOWING: 0
CONSTIPATION: 0

## 2021-09-15 ENCOUNTER — HOSPITAL ENCOUNTER (OUTPATIENT)
Dept: MAMMOGRAPHY | Age: 71
Discharge: HOME | End: 2021-09-17
Payer: MEDICARE

## 2021-09-15 DIAGNOSIS — Z12.31 VISIT FOR SCREENING MAMMOGRAM: ICD-10-CM

## 2021-09-15 PROCEDURE — 77063 BREAST TOMOSYNTHESIS BI: CPT

## 2021-10-15 ENCOUNTER — OFFICE VISIT (OUTPATIENT)
Dept: FAMILY MEDICINE CLINIC | Age: 71
End: 2021-10-15
Payer: MEDICARE

## 2021-10-15 VITALS
HEIGHT: 62 IN | DIASTOLIC BLOOD PRESSURE: 72 MMHG | HEART RATE: 64 BPM | WEIGHT: 160 LBS | SYSTOLIC BLOOD PRESSURE: 114 MMHG | BODY MASS INDEX: 29.44 KG/M2

## 2021-10-15 DIAGNOSIS — Z00.00 ROUTINE GENERAL MEDICAL EXAMINATION AT A HEALTH CARE FACILITY: ICD-10-CM

## 2021-10-15 DIAGNOSIS — Z00.00 MEDICARE ANNUAL WELLNESS VISIT, SUBSEQUENT: Primary | ICD-10-CM

## 2021-10-15 DIAGNOSIS — Z23 NEED FOR INFLUENZA VACCINATION: ICD-10-CM

## 2021-10-15 PROCEDURE — 3017F COLORECTAL CA SCREEN DOC REV: CPT | Performed by: NURSE PRACTITIONER

## 2021-10-15 PROCEDURE — G0008 ADMIN INFLUENZA VIRUS VAC: HCPCS | Performed by: NURSE PRACTITIONER

## 2021-10-15 PROCEDURE — 1123F ACP DISCUSS/DSCN MKR DOCD: CPT | Performed by: NURSE PRACTITIONER

## 2021-10-15 PROCEDURE — G8484 FLU IMMUNIZE NO ADMIN: HCPCS | Performed by: NURSE PRACTITIONER

## 2021-10-15 PROCEDURE — 4040F PNEUMOC VAC/ADMIN/RCVD: CPT | Performed by: NURSE PRACTITIONER

## 2021-10-15 PROCEDURE — 90694 VACC AIIV4 NO PRSRV 0.5ML IM: CPT | Performed by: NURSE PRACTITIONER

## 2021-10-15 PROCEDURE — G0439 PPPS, SUBSEQ VISIT: HCPCS | Performed by: NURSE PRACTITIONER

## 2021-10-15 ASSESSMENT — PATIENT HEALTH QUESTIONNAIRE - PHQ9
SUM OF ALL RESPONSES TO PHQ QUESTIONS 1-9: 0
1. LITTLE INTEREST OR PLEASURE IN DOING THINGS: 0
SUM OF ALL RESPONSES TO PHQ QUESTIONS 1-9: 0
2. FEELING DOWN, DEPRESSED OR HOPELESS: 0
SUM OF ALL RESPONSES TO PHQ QUESTIONS 1-9: 0
SUM OF ALL RESPONSES TO PHQ9 QUESTIONS 1 & 2: 0

## 2021-10-15 ASSESSMENT — LIFESTYLE VARIABLES
AUDIT-C TOTAL SCORE: 1
HOW MANY STANDARD DRINKS CONTAINING ALCOHOL DO YOU HAVE ON A TYPICAL DAY: 0
HOW OFTEN DO YOU HAVE SIX OR MORE DRINKS ON ONE OCCASION: 0
HOW OFTEN DO YOU HAVE A DRINK CONTAINING ALCOHOL: 1

## 2021-10-15 NOTE — PROGRESS NOTES
mass index is 29.26 kg/m². Based upon direct observation of the patient, evaluation of cognition reveals recent and remote memory intact. Patient's complete Health Risk Assessment and screening values have been reviewed and are found in Flowsheets. The following problems were reviewed today and where indicated follow up appointments were made and/or referrals ordered. Positive Risk Factor Screenings with Interventions:            General Health and ACP:  General  In general, how would you say your health is?: Good  In the past 7 days, have you experienced any of the following?  New or Increased Pain, New or Increased Fatigue, Loneliness, Social Isolation, Stress or Anger?: (!) Stress, Anger  Do you get the social and emotional support that you need?: Yes  Do you have a Living Will?: Yes  Advance Directives     Power of  Living Will ACP-Advance Directive ACP-Power of     Not on File Not on File Not on File Not on File      General Health Risk Interventions:  · Stress: pt has some family issues whihc makes her stressed       Safety:  Safety  Do you have working smoke detectors?: Yes  Have all throw rugs been removed or fastened?: Yes  Do you have non-slip mats or surfaces in all bathtubs/showers?: (!) No  Do all of your stairways have a railing or banister?: Yes  Are your doorways, halls and stairs free of clutter?: Yes  Do you always fasten your seatbelt when you are in a car?: Yes  Safety Interventions:  · Home safety tips provided     Personalized Preventive Plan   Current Health Maintenance Status  Immunization History   Administered Date(s) Administered    COVID-19, Pfizer, PF, 30mcg/0.3mL 02/17/2021, 03/10/2021, 09/27/2021    Influenza Virus Vaccine 10/28/2003, 10/30/2007, 09/16/2009, 11/05/2010, 09/27/2011, 09/24/2012    Influenza, High Dose (Fluzone 65 yrs and older) 09/26/2018, 10/04/2019    Influenza, Intradermal, Preservative free 10/01/2019    Influenza, Quadv, IM, PF (6 mo Other

## 2021-10-15 NOTE — PATIENT INSTRUCTIONS
Personalized Preventive Plan for [place] - 10/15/2021  Medicare offers a range of preventive health benefits. Some of the tests and screenings are paid in full while other may be subject to a deductible, co-insurance, and/or copay. Some of these benefits include a comprehensive review of your medical history including lifestyle, illnesses that may run in your family, and various assessments and screenings as appropriate. After reviewing your medical record and screening and assessments performed today your provider may have ordered immunizations, labs, imaging, and/or referrals for you. A list of these orders (if applicable) as well as your Preventive Care list are included within your After Visit Summary for your review. Other Preventive Recommendations:    · A preventive eye exam performed by an eye specialist is recommended every 1-2 years to screen for glaucoma; cataracts, macular degeneration, and other eye disorders. · A preventive dental visit is recommended every 6 months. · Try to get at least 150 minutes of exercise per week or 10,000 steps per day on a pedometer . · Order or download the FREE \"Exercise & Physical Activity: Your Everyday Guide\" from [employer]. Call 3-308.210.7391 or search [employer] online. · You need 5064-4987 mg of calcium and 9920-8877 IU of vitamin D per day. It is possible to meet your calcium requirement with diet alone, but a vitamin D supplement is usually necessary to meet this goal.  · When exposed to the sun, use a sunscreen that protects against both UVA and UVB radiation with an SPF of 30 or greater. Reapply every 2 to 3 hours or after sweating, drying off with a towel, or swimming. · Always wear a seat belt when traveling in a car. Always wear a helmet when riding a bicycle or motorcycle.

## 2022-02-23 ENCOUNTER — TELEPHONE (OUTPATIENT)
Dept: OBGYN CLINIC | Age: 72
End: 2022-02-23

## 2022-02-23 NOTE — TELEPHONE ENCOUNTER
Pt called she had a lump under her right breast when  She was laying down but she does not feel it now she had a mehrdad 09/21 she is wondering what you recommend

## 2022-02-24 ENCOUNTER — OFFICE VISIT (OUTPATIENT)
Dept: OBGYN CLINIC | Age: 72
End: 2022-02-24
Payer: MEDICARE

## 2022-02-24 VITALS
DIASTOLIC BLOOD PRESSURE: 76 MMHG | BODY MASS INDEX: 28.97 KG/M2 | HEIGHT: 62 IN | WEIGHT: 157.4 LBS | SYSTOLIC BLOOD PRESSURE: 134 MMHG

## 2022-02-24 DIAGNOSIS — N63.15 BREAST LUMP ON RIGHT SIDE AT 3 O'CLOCK POSITION: Primary | ICD-10-CM

## 2022-02-24 PROCEDURE — G8484 FLU IMMUNIZE NO ADMIN: HCPCS | Performed by: NURSE PRACTITIONER

## 2022-02-24 PROCEDURE — 99213 OFFICE O/P EST LOW 20 MIN: CPT | Performed by: NURSE PRACTITIONER

## 2022-02-24 ASSESSMENT — ENCOUNTER SYMPTOMS
CONSTIPATION: 0
BACK PAIN: 0
DIARRHEA: 0
ABDOMINAL PAIN: 0
ABDOMINAL DISTENTION: 0
COUGH: 0
SHORTNESS OF BREATH: 0

## 2022-02-24 NOTE — PROGRESS NOTES
Subjective:      Patient ID: Rosie Patricia is being seen today for   Chief Complaint   Patient presents with    Breast Mass     x2days not painful - last mammogram 9/15/21-WNL        HPI  Here for evaluation of a lump in her right breast that she noticed 2 days ago when lying araceli. She was unable to palpate it when she was upright. Mammogram normal in 9/2021. Denies personal and family hx BrCa. Denies other skin changes, nipple discharge, pain. Review of Systems   Constitutional: Negative for appetite change and fatigue. HENT: Negative for congestion and hearing loss. Eyes: Negative for visual disturbance. Respiratory: Negative for cough and shortness of breath. Cardiovascular: Negative for chest pain and palpitations. Gastrointestinal: Negative for abdominal distention, abdominal pain, constipation and diarrhea. Genitourinary: Negative for flank pain, frequency, menstrual problem, pelvic pain and vaginal discharge. Musculoskeletal: Negative for back pain. Neurological: Negative for syncope and headaches. Psychiatric/Behavioral: Negative for behavioral problems.        Vitals:    02/24/22 0841   BP: 134/76   Site: Right Upper Arm   Position: Sitting   Cuff Size: Medium Adult   Weight: 157 lb 6.4 oz (71.4 kg)   Height: 5' 2\" (1.575 m)     Current Outpatient Medications   Medication Sig Dispense Refill    pantoprazole (PROTONIX) 40 MG tablet Take 1 tablet by mouth daily 90 tablet 3    ZINC PO Take by mouth      albuterol sulfate HFA (PROAIR HFA) 108 (90 Base) MCG/ACT inhaler Inhale 2 puffs into the lungs every 6 hours as needed for Wheezing 1 Inhaler 2    diclofenac sodium (VOLTAREN) 1 % GEL Apply topically 2 times daily 100 g 0    vitamin C (ASCORBIC ACID) 500 MG tablet Take 500 mg by mouth daily      valACYclovir (VALTREX) 1 g tablet Take 1,000 mg by mouth 2 times daily Patient uses PRN      Cyanocobalamin (VITAMIN B-12 CR PO) Take by mouth      Coenzyme Q10 (COQ10 PO) Take by mouth deficit present. Mental Status: She is alert and oriented to person, place, and time. Mental status is at baseline. Skin:     General: Skin is warm and dry. Psychiatric:         Mood and Affect: Mood normal.         Behavior: Behavior normal.         Thought Content: Thought content normal.         Judgment: Judgment normal.         Assessment:      1. Breast lump on right side at 3 o'clock position          Plan:      Patient is a 80 y.o. [id number]  seen with total face to face time of 15 minutes. More than 50% of this visit was on counseling and education regarding her    Diagnosis Orders   1. Breast lump on right side at 3 o'clock position  ELNORA ANTONIETA DIGITAL DIAGNOSTIC UNILATERAL RIGHT    US BREAST LIMITED RIGHT    and her options. She was also counseled on her preventative health maintenance recommendations and follow-up of annual exam. Refer to plan and assessment.     Recent Results (from the past 8736 hour(s))   CBC Auto Differential    Collection Time: 06/04/21  9:06 AM   Result Value Ref Range    WBC 10.5 3.5 - 11.3 k/uL    RBC 4.57 3.95 - 5.11 m/uL    Hemoglobin 13.5 11.9 - 15.1 g/dL    Hematocrit 41.7 36.3 - 47.1 %    MCV 91.2 82.6 - 102.9 fL    MCH 29.5 25.2 - 33.5 pg    MCHC 32.4 28.4 - 34.8 g/dL    RDW 12.4 11.8 - 14.4 %    Platelets 649 342 - 352 k/uL    MPV 9.8 8.1 - 13.5 fL    NRBC Automated 0.0 0.0 per 100 WBC    Differential Type NOT REPORTED     Seg Neutrophils 68 (H) 36 - 65 %    Lymphocytes 18 (L) 24 - 43 %    Monocytes 11 3 - 12 %    Eosinophils % 3 1 - 4 %    Basophils 0 0 - 2 %    Immature Granulocytes 0 0 %    Segs Absolute 7.19 1.50 - 8.10 k/uL    Absolute Lymph # 1.89 1.10 - 3.70 k/uL    Absolute Mono # 1.11 0.10 - 1.20 k/uL    Absolute Eos # 0.27 0.00 - 0.44 k/uL    Basophils Absolute 0.04 0.00 - 0.20 k/uL    Absolute Immature Granulocyte 0.04 0.00 - 0.30 k/uL    WBC Morphology NOT REPORTED     RBC Morphology NOT REPORTED     Platelet Estimate NOT REPORTED    Hemoglobin A1C SARS-CoV-2 Not Detected Not Detected         PLAN:   Diagnostic right breast mammogram and US ordered  FU annual exam.

## 2022-02-28 ENCOUNTER — HOSPITAL ENCOUNTER (OUTPATIENT)
Dept: MAMMOGRAPHY | Age: 72
Discharge: HOME | End: 2022-03-02
Payer: MEDICARE

## 2022-02-28 ENCOUNTER — HOSPITAL ENCOUNTER (OUTPATIENT)
Dept: ULTRASOUND IMAGING | Age: 72
Discharge: HOME | End: 2022-03-02
Payer: MEDICARE

## 2022-02-28 DIAGNOSIS — N63.15 BREAST LUMP ON RIGHT SIDE AT 3 O'CLOCK POSITION: ICD-10-CM

## 2022-02-28 PROCEDURE — G0279 TOMOSYNTHESIS, MAMMO: HCPCS

## 2022-02-28 PROCEDURE — 76642 ULTRASOUND BREAST LIMITED: CPT

## 2022-04-18 ENCOUNTER — OFFICE VISIT (OUTPATIENT)
Dept: OBGYN CLINIC | Age: 72
End: 2022-04-18
Payer: MEDICARE

## 2022-04-18 ENCOUNTER — OFFICE VISIT (OUTPATIENT)
Dept: FAMILY MEDICINE CLINIC | Age: 72
End: 2022-04-18
Payer: MEDICARE

## 2022-04-18 VITALS
BODY MASS INDEX: 28.67 KG/M2 | HEIGHT: 62 IN | DIASTOLIC BLOOD PRESSURE: 84 MMHG | WEIGHT: 155.8 LBS | SYSTOLIC BLOOD PRESSURE: 136 MMHG

## 2022-04-18 VITALS
HEART RATE: 69 BPM | HEIGHT: 62 IN | SYSTOLIC BLOOD PRESSURE: 112 MMHG | OXYGEN SATURATION: 96 % | DIASTOLIC BLOOD PRESSURE: 70 MMHG | TEMPERATURE: 97.2 F | WEIGHT: 154.8 LBS | BODY MASS INDEX: 28.49 KG/M2

## 2022-04-18 DIAGNOSIS — M34.9 SCLERODERMA (HCC): ICD-10-CM

## 2022-04-18 DIAGNOSIS — Z01.419 ENCOUNTER FOR GYNECOLOGICAL EXAMINATION: Primary | ICD-10-CM

## 2022-04-18 DIAGNOSIS — I25.10 ATHEROSCLEROSIS OF NATIVE CORONARY ARTERY WITHOUT ANGINA PECTORIS, UNSPECIFIED WHETHER NATIVE OR TRANSPLANTED HEART: ICD-10-CM

## 2022-04-18 DIAGNOSIS — R31.9 HEMATURIA, UNSPECIFIED TYPE: ICD-10-CM

## 2022-04-18 DIAGNOSIS — E04.1 THYROID NODULE: ICD-10-CM

## 2022-04-18 DIAGNOSIS — F41.9 ANXIETY: ICD-10-CM

## 2022-04-18 DIAGNOSIS — Z78.0 POSTMENOPAUSAL: ICD-10-CM

## 2022-04-18 DIAGNOSIS — Z12.31 ENCOUNTER FOR SCREENING MAMMOGRAM FOR BREAST CANCER: ICD-10-CM

## 2022-04-18 PROCEDURE — 3017F COLORECTAL CA SCREEN DOC REV: CPT | Performed by: NURSE PRACTITIONER

## 2022-04-18 PROCEDURE — G0101 CA SCREEN;PELVIC/BREAST EXAM: HCPCS | Performed by: NURSE PRACTITIONER

## 2022-04-18 PROCEDURE — G8427 DOCREV CUR MEDS BY ELIG CLIN: HCPCS | Performed by: NURSE PRACTITIONER

## 2022-04-18 PROCEDURE — G8417 CALC BMI ABV UP PARAM F/U: HCPCS | Performed by: NURSE PRACTITIONER

## 2022-04-18 PROCEDURE — 1123F ACP DISCUSS/DSCN MKR DOCD: CPT | Performed by: NURSE PRACTITIONER

## 2022-04-18 PROCEDURE — 99214 OFFICE O/P EST MOD 30 MIN: CPT | Performed by: NURSE PRACTITIONER

## 2022-04-18 PROCEDURE — G8399 PT W/DXA RESULTS DOCUMENT: HCPCS | Performed by: NURSE PRACTITIONER

## 2022-04-18 PROCEDURE — 1036F TOBACCO NON-USER: CPT | Performed by: NURSE PRACTITIONER

## 2022-04-18 PROCEDURE — 4040F PNEUMOC VAC/ADMIN/RCVD: CPT | Performed by: NURSE PRACTITIONER

## 2022-04-18 PROCEDURE — 1090F PRES/ABSN URINE INCON ASSESS: CPT | Performed by: NURSE PRACTITIONER

## 2022-04-18 RX ORDER — ASPIRIN 81 MG/1
81 TABLET ORAL DAILY
Qty: 90 TABLET | Refills: 1 | Status: SHIPPED | OUTPATIENT
Start: 2022-04-18 | End: 2022-10-10

## 2022-04-18 RX ORDER — ROSUVASTATIN CALCIUM 5 MG/1
5 TABLET, COATED ORAL NIGHTLY
Qty: 90 TABLET | Refills: 1 | Status: SHIPPED | OUTPATIENT
Start: 2022-04-18 | End: 2022-10-10

## 2022-04-18 RX ORDER — HYDROXYZINE HYDROCHLORIDE 25 MG/1
25 TABLET, FILM COATED ORAL DAILY PRN
Qty: 30 TABLET | Refills: 0 | Status: SHIPPED | OUTPATIENT
Start: 2022-04-18 | End: 2022-05-19

## 2022-04-18 SDOH — ECONOMIC STABILITY: FOOD INSECURITY: WITHIN THE PAST 12 MONTHS, YOU WORRIED THAT YOUR FOOD WOULD RUN OUT BEFORE YOU GOT MONEY TO BUY MORE.: NEVER TRUE

## 2022-04-18 SDOH — ECONOMIC STABILITY: FOOD INSECURITY: WITHIN THE PAST 12 MONTHS, THE FOOD YOU BOUGHT JUST DIDN'T LAST AND YOU DIDN'T HAVE MONEY TO GET MORE.: NEVER TRUE

## 2022-04-18 ASSESSMENT — ENCOUNTER SYMPTOMS
SHORTNESS OF BREATH: 0
VOMITING: 0
ABDOMINAL DISTENTION: 0
ABDOMINAL PAIN: 0
CONSTIPATION: 0
WHEEZING: 0
ABDOMINAL PAIN: 0
BACK PAIN: 0
SHORTNESS OF BREATH: 0
NAUSEA: 0
DIARRHEA: 0
COUGH: 0

## 2022-04-18 ASSESSMENT — SOCIAL DETERMINANTS OF HEALTH (SDOH): HOW HARD IS IT FOR YOU TO PAY FOR THE VERY BASICS LIKE FOOD, HOUSING, MEDICAL CARE, AND HEATING?: NOT HARD AT ALL

## 2022-04-18 NOTE — PROGRESS NOTES
[address] [facility] Julianna Cornell  [address] [place] [address] [address] [address]  Dept: [phone]  OF VISIT:  22        History and Physical    Reece Calvo    :  1950  CHIEF COMPLAINT:  No chief complaint on file. HPI :   Reece Calvo is a 80 y.o. female    Retired for about 4 years. Exercises often for a hobby. Involved in [employer], exercising. Has a 34 y/o daughter and a 2 1/1 y/o granddaughter 3 days a week. Seeing rheumatology. Saw urology for hematuria- negative testing. Gave her something to put in her vagina \"for dryness.'    The patient was seen and examined. Per the patient bowels are regular. She has no voiding complaints. She denies any bloating as well as vaginal discharge. Denies vaginal dryness. Denies hot flushes. Denies VB.   _____________________________________________________________________  Past Medical History:   Diagnosis Date    Anxiety     Diverticulosis of colon     DJD (degenerative joint disease) 2015    Family history of colon cancer     Generalized OA 2015    GERD (gastroesophageal reflux disease)     Hyperlipidemia 2015    Irritable bowel syndrome     Osteoarthritis                                                                    Past Surgical History:   Procedure Laterality Date    CHOLECYSTECTOMY      COLONOSCOPY      normal    COLONOSCOPY  14    diverticulosis    COLONOSCOPY N/A 2018    COLONOSCOPY POLYPECTOMY COLD BIOPSY performed by Reena Xavier MD at [address] [place] [address]  2018    Spasms in the sigmoid colon. No colitis or ileitis seen.     DILATION AND CURETTAGE OF UTERUS  1970    DILATION AND CURETTAGE OF UTERUS  1991    ENDOSCOPY, COLON, DIAGNOSTIC      JOINT REPLACEMENT Right 2013    hip    TONSILLECTOMY      UPPER GASTROINTESTINAL ENDOSCOPY      normal    UPPER GASTROINTESTINAL ENDOSCOPY  14 esophagitis with bx     Family History   Problem Relation Age of Onset    Other Mother         had many stomach surgeries    COPD Father     Cancer Father         colon    Other Brother         sepsis    Cancer Maternal Grandfather         colon     Social History     Tobacco Use   Smoking Status Former Smoker    Packs/day: 1.50    Years: 20.00    Pack years: 30.00    Quit date: 1990    Years since quittin.3   Smokeless Tobacco Never Used     Social History     Substance and Sexual Activity   Alcohol Use Yes     Current Outpatient Medications   Medication Sig Dispense Refill    pantoprazole (PROTONIX) 40 MG tablet Take 1 tablet by mouth daily 90 tablet 3    ZINC PO Take by mouth      albuterol sulfate HFA (PROAIR HFA) 108 (90 Base) MCG/ACT inhaler Inhale 2 puffs into the lungs every 6 hours as needed for Wheezing 1 Inhaler 2    diclofenac sodium (VOLTAREN) 1 % GEL Apply topically 2 times daily 100 g 0    vitamin C (ASCORBIC ACID) 500 MG tablet Take 500 mg by mouth daily      valACYclovir (VALTREX) 1 g tablet Take 1,000 mg by mouth 2 times daily Patient uses PRN      Cyanocobalamin (VITAMIN B-12 CR PO) Take by mouth      Coenzyme Q10 (COQ10 PO) Take by mouth      Multiple Vitamins-Minerals (MULTIVITAMIN PO) Take by mouth      vitamin D (ERGOCALCIFEROL) 400 UNITS CAPS Take 400 Units by mouth daily      Calcium Carbonate-Vitamin D (CALCIUM + D) 600-200 MG-UNIT TABS Take  by mouth. No current facility-administered medications for this visit. Allergies:  Nalbuphine, Sulfa antibiotics, Formaldehyde, Nickel, Morphine and related, and Oxycodone    Gynecologic History:  No LMP recorded. Patient is postmenopausal.  Sexually Active: No  How many partners in the last 12 months- 1  Dyspareunia: NA  STD History: No  Pap smear history:  NILM  Hx HRT denies  Dexascan: 2019 osteopenia. Ordered repeat  Mammogram:  BIRAD 1.  Right breast US benign  Colonoscopy: 2018  Family hx Breast, Ovarian or Colorectal Cancer: denies    OB History    Para Term  AB Living   4 2 1 0 2 1   SAB IAB Ectopic Molar Multiple Live Births   2 0 0 0 0 1     ______________________________________________________________________  REVIEW OF SYSTEMS:  Review of Systems   Constitutional: Negative for appetite change and fatigue. HENT: Negative for congestion and hearing loss. Eyes: Negative for visual disturbance. Respiratory: Negative for cough and shortness of breath. Cardiovascular: Negative for chest pain and palpitations. Gastrointestinal: Negative for abdominal distention, abdominal pain, constipation and diarrhea. Genitourinary: Negative for flank pain, frequency, menstrual problem, pelvic pain and vaginal discharge. Musculoskeletal: Negative for back pain. Neurological: Negative for syncope and headaches. Psychiatric/Behavioral: Negative for behavioral problems. There were no vitals taken for this visit. Physical Exam:     Physical Exam  Constitutional:       Appearance: She is well-developed. HENT:      Head: Normocephalic. Eyes:      Extraocular Movements: Extraocular movements intact. Conjunctiva/sclera: Conjunctivae normal.   Neck:      Thyroid: No thyromegaly. Trachea: No tracheal deviation. Pulmonary:      Effort: Pulmonary effort is normal. No respiratory distress. Chest:   Breasts: Breasts are symmetrical.      Right: No inverted nipple. Left: No inverted nipple, mass, nipple discharge, skin change or tenderness. Abdominal:      General: There is no distension. Palpations: Abdomen is soft. There is no mass. Tenderness: There is no abdominal tenderness. Genitourinary:     Labia:         Right: No rash or lesion. Left: No rash or lesion. Vagina: No vaginal discharge or tenderness. Cervix: No cervical motion tenderness, discharge or friability.       Uterus: Not deviated, not enlarged and not fixed. Adnexa:         Right: No mass, tenderness or fullness. Left: No mass, tenderness or fullness. Musculoskeletal:         General: No tenderness. Normal range of motion. Cervical back: Normal range of motion. Skin:     General: Skin is warm and dry. Neurological:      General: No focal deficit present. Mental Status: She is alert and oriented to person, place, and time. Mental status is at baseline. Psychiatric:         Mood and Affect: Mood normal.         Behavior: Behavior normal.         Thought Content: Thought content normal.         Judgment: Judgment normal.             ASSESSMENT:        80 y.o. Female; Annual   Diagnosis Orders   1. Encounter for gynecological examination     2. Encounter for screening mammogram for breast cancer       No follow-ups on file. Hereditary Breast, Ovarian,Colon and Uterine Cancer screening Done. Tobacco & Secondary smoke risks reviewed; instructed oncessation and avoidance    PLAN:  - Pap not indicated. -Menopause symptoms discussed   - Incontinence  - Vaginal Dryness  - Hormone Replacement  - Screening mammogram discussed and advised yearly if normal starting at age 36.  - Calcium and Vitamin D dosing reviewed. - Colonoscopy screening reviewed. -General diet and exercise reviewed. - Routine health maintenance per patients PCP.     Electronically signed by DREW Perez - CNP on 4/18/2022at 6:47 AM  [address]  [address] [place] [address]

## 2022-04-18 NOTE — PROGRESS NOTES
Subjective:      Patient ID: Lia Cavazos is a 80 y.o. female. Visit Information    Have you changed or started any medications since your last visit including any over-the-counter medicines, vitamins, or herbal medicines? no   Are you having any side effects from any of your medications? -  no  Have you stopped taking any of your medications? Is so, why? -  no    Have you seen any other physician or provider since your last visit? Yes - Records Requested  Have you had any other diagnostic tests since your last visit? Yes - Records Requested  Have you been seen in the emergency room and/or had an admission to a hospital since we last saw you? No  Have you had your routine dental cleaning in the past 6 months? Yes     Have you activated your [employer] account? If not, what are your barriers?  Yes     Patient Care Team:  DREW Rodriguez CNP as PCP - General (Family Nurse Practitioner)  DREW Rodriguez CNP as PCP - [facility]  Coleman Becerril MD as Consulting Physician (Gastroenterology)  Ramiro Sánchez MD as Consulting Physician (Rheumatology)  Fuad Jo MD (Dermatology)  Juan Oneill MD as Consulting Physician (Allergy & Immunology)    Medical History Review  Past Medical, Family, and Social History reviewed and does contribute to the patient presenting condition    Health Maintenance   Topic Date Due    DTaP/Tdap/Td vaccine (1 - Tdap) Never done    Lipid screen  06/04/2022    Depression Screen  10/15/2022    Annual Wellness Visit (AWV)  10/16/2022    Breast cancer screen  02/28/2024    Colorectal Cancer Screen  02/09/2028    DEXA (modify frequency per FRAX score)  Completed    Flu vaccine  Completed    Shingles Vaccine  Completed    Pneumococcal 65+ years Vaccine  Completed    COVID-19 Vaccine  Completed    Hepatitis C screen  Completed    Hepatitis A vaccine  Aged Out    Hepatitis B vaccine  Aged Out    Hib vaccine  Aged Out    Meningococcal (ACWY) vaccine Aged Out     HPI    80year old female presents with management of followin. Atherosclerotic vascular disease and thyroid nodules - pt recently had some blood work and CT chest which showed atherosclerotic vascular disease and nodules in bilateral thyroid glands measuring 1.2 cm. Recent TSH was unremarkable and pt follows up with Dr. Miles Dillon. 2. Hematuria and Anxiety - small blood in UA and pt is scheduled to have cystoscope per urology. States she feels anxious before the procedure and would like some atarax   3. Scleroderma - currently follows up with Dr. Steven Deleon at [facility]. The condition is stable. Review of Systems   Constitutional: Negative for chills, fatigue and fever. Eyes: Negative for visual disturbance. Respiratory: Negative for shortness of breath and wheezing. Cardiovascular: Negative for chest pain and leg swelling. Gastrointestinal: Negative for abdominal pain, nausea and vomiting. Neurological: Negative for dizziness, weakness and numbness. Psychiatric/Behavioral: Negative for agitation, dysphoric mood, sleep disturbance and suicidal ideas. The patient is nervous/anxious. Objective:   Physical Exam  Vitals and nursing note reviewed. Constitutional:       General: She is not in acute distress. Appearance: Normal appearance. HENT:      Nose: Nose normal.   Eyes:      Conjunctiva/sclera: Conjunctivae normal.   Cardiovascular:      Rate and Rhythm: Normal rate and regular rhythm. Heart sounds: Normal heart sounds. Pulmonary:      Effort: Pulmonary effort is normal. No respiratory distress. Breath sounds: Normal breath sounds. Abdominal:      Palpations: Abdomen is soft. Tenderness: There is no abdominal tenderness. Musculoskeletal:         General: Normal range of motion. Cervical back: Neck supple. Lymphadenopathy:      Cervical: No cervical adenopathy. Skin:     General: Skin is warm and dry.    Neurological:      Mental Status: She is alert and oriented to person, place, and time. Cranial Nerves: No cranial nerve deficit. Psychiatric:         Mood and Affect: Mood normal.         Behavior: Behavior normal.         Assessment:      1. Thyroid nodule    2. Atherosclerosis of native coronary artery without angina pectoris, unspecified whether native or transplanted heart    3. Anxiety    4. Hematuria, unspecified type    5. Scleroderma ([facility] [address]            Plan:      BP Readings from Last 3 Encounters:   04/18/22 112/70   02/24/22 134/76   10/15/21 114/72     /70   Pulse 69   Temp 97.2 °F (36.2 °C) (Infrared)   Ht 5' 2\" (1.575 m)   Wt 154 lb 12.8 oz (70.2 kg)   SpO2 96%   BMI 28.31 kg/m²   Lab Results   Component Value Date    WBC 10.5 06/04/2021    HGB 13.5 06/04/2021    HCT 41.7 06/04/2021     06/04/2021    CHOL 134 06/04/2021    TRIG 127 06/04/2021    HDL 42 06/04/2021    ALT 13 06/04/2021    AST 19 06/04/2021     06/04/2021    K 3.8 06/04/2021     06/04/2021    CREATININE 0.58 06/04/2021    BUN 16 06/04/2021    CO2 25 06/04/2021    TSH 4.86 06/04/2021    INR 0.9 11/22/2020    LABA1C 5.4 06/04/2021     Lab Results   Component Value Date    CALCIUM 8.7 06/04/2021     Lab Results   Component Value Date    LDLCALC 75 06/17/2015    LDLCHOLESTEROL 67 06/04/2021         1. Atherosclerosis of native coronary artery without angina pectoris, unspecified whether native or transplanted heart  - start low dose statin therapy and ASA   - rosuvastatin (CRESTOR) 5 MG tablet; Take 1 tablet by mouth nightly  Dispense: 90 tablet; Refill: 1  - aspirin EC 81 MG EC tablet; Take 1 tablet by mouth daily  Dispense: 90 tablet; Refill: 1    2. Thyroid nodule  - cont to monitor   - follow up with Dr. Claude Neve as scheduled     3. Anxiety  - start hydrOXYzine (ATARAX) 25 MG tablet; Take 1 tablet by mouth daily as needed for Anxiety  Dispense: 30 tablet; Refill: 0    4.  Hematuria, unspecified type  - cont to monitor   - follow up with urology as scheduled     5. Scleroderma ([facility] [address]  - stable. Cont to monitor   - follow up with Dr. Sherlyn Poole as scheduled         Requested Prescriptions     Signed Prescriptions Disp Refills    rosuvastatin (CRESTOR) 5 MG tablet 90 tablet 1     Sig: Take 1 tablet by mouth nightly    hydrOXYzine (ATARAX) 25 MG tablet 30 tablet 0     Sig: Take 1 tablet by mouth daily as needed for Anxiety    aspirin EC 81 MG EC tablet 90 tablet 1     Sig: Take 1 tablet by mouth daily       There are no discontinued medications. Discussed use, benefit, and side effects of prescribed medications. Barriers to medication compliance addressed. All patient questions answered. Pt voiced understanding. Return in about 6 months (around 10/18/2022).             DREW Gentile - CNP

## 2022-05-13 ENCOUNTER — TELEPHONE (OUTPATIENT)
Dept: FAMILY MEDICINE CLINIC | Age: 72
End: 2022-05-13

## 2022-05-13 NOTE — TELEPHONE ENCOUNTER
FYI:  Patient called C/O sore throat,cough, blowing color since Monday, 5-9-2 covid vaccinated, patient states she tested for Covid yesterday it was negative. Patient was directed to the [facility]. She agreed to go.

## 2022-05-16 ENCOUNTER — HOSPITAL ENCOUNTER (OUTPATIENT)
Dept: MAMMOGRAPHY | Age: 72
Discharge: HOME | End: 2022-05-18
Payer: MEDICARE

## 2022-05-16 DIAGNOSIS — Z78.0 POSTMENOPAUSAL: ICD-10-CM

## 2022-05-16 PROCEDURE — 77080 DXA BONE DENSITY AXIAL: CPT

## 2022-05-19 DIAGNOSIS — F41.9 ANXIETY: ICD-10-CM

## 2022-05-19 RX ORDER — HYDROXYZINE HYDROCHLORIDE 25 MG/1
TABLET, FILM COATED ORAL
Qty: 30 TABLET | Refills: 0 | Status: SHIPPED | OUTPATIENT
Start: 2022-05-19 | End: 2022-06-13

## 2022-05-20 ENCOUNTER — OFFICE VISIT (OUTPATIENT)
Dept: FAMILY MEDICINE CLINIC | Age: 72
End: 2022-05-20
Payer: MEDICARE

## 2022-05-20 VITALS
SYSTOLIC BLOOD PRESSURE: 124 MMHG | OXYGEN SATURATION: 95 % | RESPIRATION RATE: 16 BRPM | HEART RATE: 67 BPM | TEMPERATURE: 98.1 F | DIASTOLIC BLOOD PRESSURE: 76 MMHG | BODY MASS INDEX: 28.2 KG/M2 | WEIGHT: 154.2 LBS

## 2022-05-20 DIAGNOSIS — R05.9 COUGH: ICD-10-CM

## 2022-05-20 DIAGNOSIS — J20.9 ACUTE BRONCHITIS, UNSPECIFIED ORGANISM: Primary | ICD-10-CM

## 2022-05-20 PROCEDURE — G8399 PT W/DXA RESULTS DOCUMENT: HCPCS | Performed by: NURSE PRACTITIONER

## 2022-05-20 PROCEDURE — G8417 CALC BMI ABV UP PARAM F/U: HCPCS | Performed by: NURSE PRACTITIONER

## 2022-05-20 PROCEDURE — 3017F COLORECTAL CA SCREEN DOC REV: CPT | Performed by: NURSE PRACTITIONER

## 2022-05-20 PROCEDURE — 99213 OFFICE O/P EST LOW 20 MIN: CPT | Performed by: NURSE PRACTITIONER

## 2022-05-20 PROCEDURE — 1036F TOBACCO NON-USER: CPT | Performed by: NURSE PRACTITIONER

## 2022-05-20 PROCEDURE — G8427 DOCREV CUR MEDS BY ELIG CLIN: HCPCS | Performed by: NURSE PRACTITIONER

## 2022-05-20 PROCEDURE — 1090F PRES/ABSN URINE INCON ASSESS: CPT | Performed by: NURSE PRACTITIONER

## 2022-05-20 PROCEDURE — 1123F ACP DISCUSS/DSCN MKR DOCD: CPT | Performed by: NURSE PRACTITIONER

## 2022-05-20 RX ORDER — AZITHROMYCIN 250 MG/1
TABLET, FILM COATED ORAL
Qty: 1 PACKET | Refills: 0 | Status: SHIPPED | OUTPATIENT
Start: 2022-05-20 | End: 2022-05-30

## 2022-05-20 RX ORDER — BENZONATATE 100 MG/1
100 CAPSULE ORAL 3 TIMES DAILY PRN
Qty: 30 CAPSULE | Refills: 0 | Status: SHIPPED | OUTPATIENT
Start: 2022-05-20 | End: 2022-05-30

## 2022-05-20 ASSESSMENT — ENCOUNTER SYMPTOMS
NAUSEA: 0
CHEST TIGHTNESS: 1
EYE PAIN: 0
SORE THROAT: 0
COUGH: 1
RHINORRHEA: 0
VOMITING: 0
SHORTNESS OF BREATH: 0

## 2022-05-20 ASSESSMENT — PATIENT HEALTH QUESTIONNAIRE - PHQ9
SUM OF ALL RESPONSES TO PHQ QUESTIONS 1-9: 0
1. LITTLE INTEREST OR PLEASURE IN DOING THINGS: 0
SUM OF ALL RESPONSES TO PHQ QUESTIONS 1-9: 0
SUM OF ALL RESPONSES TO PHQ9 QUESTIONS 1 & 2: 0
SUM OF ALL RESPONSES TO PHQ QUESTIONS 1-9: 0
2. FEELING DOWN, DEPRESSED OR HOPELESS: 0
SUM OF ALL RESPONSES TO PHQ QUESTIONS 1-9: 0

## 2022-05-20 NOTE — PROGRESS NOTES
[address] [facility]  [address] [facility] [address]  [address] [address]  Dept: [phone]  Dept Fax: [phone]    Ct Hernandez is a 80 y.o. female who presents today for her medical conditions/complaints of   Chief Complaint   Patient presents with    Nasal Congestion     started x2 wks, had two negative at home covid tests    Pharyngitis     went away the next day when sx started          HPI:     /76   Pulse 67   Temp 98.1 °F (36.7 °C)   Resp 16   Wt 154 lb 3.2 oz (69.9 kg)   SpO2 95%   Breastfeeding No   BMI 28.20 kg/m²       HPI  Pt presented to the clinic today with c/o congestion. This is a new problem. The current episode started 2 weeks ago. The problem has been unchanged since onset. Associated symptoms include: cough- productive with clear sputum, headache, chest tightness . Pertinent negatives include: No fever, chills, SOB, chest pain, abdominal pain. Pt has tried Dayquil with little improvement. Vaccinated for COVID:  [facility] baby had a cold recently. Home COVID test neg x 2. No history of asthma. History of bronchitis. Has Albuterol inhaler at home- has not used. Past Medical History:   Diagnosis Date    Anxiety     Diverticulosis of colon     DJD (degenerative joint disease) 2015    Family history of colon cancer     Generalized OA 2015    GERD (gastroesophageal reflux disease)     Hyperlipidemia 2015    Irritable bowel syndrome     Osteoarthritis         Past Surgical History:   Procedure Laterality Date    CHOLECYSTECTOMY      COLONOSCOPY      normal    COLONOSCOPY  14    diverticulosis    COLONOSCOPY N/A 2018    COLONOSCOPY POLYPECTOMY COLD BIOPSY performed by Sarah Moser MD at [address] [place] [address]  2018    Spasms in the sigmoid colon. No colitis or ileitis seen.     DILATION AND CURETTAGE OF UTERUS  1970    DILATION AND CURETTAGE OF UTERUS      ENDOSCOPY, COLON, DIAGNOSTIC      JOINT REPLACEMENT Right 2013    hip    TONSILLECTOMY  1954    UPPER GASTROINTESTINAL ENDOSCOPY      normal    UPPER GASTROINTESTINAL ENDOSCOPY  14    esophagitis with bx       Family History   Problem Relation Age of Onset    Other Mother         had many stomach surgeries    COPD Father     Cancer Father         colon    Other Brother         sepsis    Cancer Maternal Grandfather         colon       Social History     Tobacco Use    Smoking status: Former Smoker     Packs/day: 1.50     Years: 20.00     Pack years: 30.00     Quit date: 1990     Years since quittin.4    Smokeless tobacco: Never Used   Substance Use Topics    Alcohol use: Yes        Prior to Visit Medications    Medication Sig Taking? Authorizing Provider   azithromycin (ZITHROMAX) 250 MG tablet Take 2 tablets (500 mg) on Day 1, followed by 1 tablet (250 mg) once daily on Days 2 through 5.  Yes DREW Lauren CNP   benzonatate (TESSALON PERLES) 100 MG capsule Take 1 capsule by mouth 3 times daily as needed for Cough Yes DREW Lauren CNP   hydrOXYzine (ATARAX) 25 MG tablet TAKE 1 TABLET BY MOUTH EVERY DAY AS NEEDED FOR ANXIETY Yes DREW Arnett CNP   rosuvastatin (CRESTOR) 5 MG tablet Take 1 tablet by mouth nightly Yes DREW Arnett CNP   aspirin EC 81 MG EC tablet Take 1 tablet by mouth daily Yes DREW Arnett CNP   pantoprazole (PROTONIX) 40 MG tablet Take 1 tablet by mouth daily Yes DREW Stoll NP   ZINC PO Take by mouth Yes Historical Provider, MD   albuterol sulfate HFA (PROAIR HFA) 108 (90 Base) MCG/ACT inhaler Inhale 2 puffs into the lungs every 6 hours as needed for Wheezing Yes DREW Arnett CNP   diclofenac sodium (VOLTAREN) 1 % GEL Apply topically 2 times daily Yes DREW Arnett CNP   vitamin C (ASCORBIC ACID) 500 MG tablet Take 500 mg by mouth daily Yes Historical Provider, MD   valACYclovir (VALTREX) 1 g tablet Take 1,000 mg by mouth 2 times daily Patient uses PRN Yes Historical Provider, MD   Cyanocobalamin (VITAMIN B-12 CR PO) Take by mouth Yes Historical Provider, MD   Coenzyme Q10 (COQ10 PO) Take by mouth Yes Historical Provider, MD   Multiple Vitamins-Minerals (MULTIVITAMIN PO) Take by mouth Yes Historical Provider, MD   vitamin D (ERGOCALCIFEROL) 400 UNITS CAPS Take 400 Units by mouth daily Yes Historical Provider, MD   Calcium Carbonate-Vitamin D (CALCIUM + D) 600-200 MG-UNIT TABS Take  by mouth. Yes Historical Provider, MD       Allergies   Allergen Reactions    Nalbuphine      Other reaction(s): claustophobia; confusion    Sulfa Antibiotics Hives    Formaldehyde Rash    Nickel Rash    Morphine And Related Other (See Comments)    Oxycodone          Subjective:      Review of Systems   Constitutional: Negative for chills and fever. HENT: Positive for congestion. Negative for ear pain, rhinorrhea and sore throat. Eyes: Negative for pain and visual disturbance. Respiratory: Positive for cough and chest tightness. Negative for shortness of breath. Cardiovascular: Negative for chest pain, palpitations and leg swelling. Gastrointestinal: Negative for nausea and vomiting. Genitourinary: Negative for decreased urine volume and difficulty urinating. Musculoskeletal: Negative for gait problem, myalgias and neck pain. Skin: Negative for pallor and rash. Neurological: Positive for headaches. Negative for weakness and light-headedness. Psychiatric/Behavioral: Negative for sleep disturbance. Objective:     Physical Exam  Vitals and nursing note reviewed. Constitutional:       General: She is not in acute distress. Appearance: Normal appearance. HENT:      Head: Normocephalic and atraumatic. Right Ear: Tympanic membrane and ear canal normal.      Left Ear: Tympanic membrane and ear canal normal.      Nose: Congestion present. Mouth/Throat:      Lips: Pink.       Mouth: Mucous membranes are moist.      Pharynx: Oropharynx is clear. Uvula midline. Eyes:      Extraocular Movements: Extraocular movements intact. Conjunctiva/sclera: Conjunctivae normal.   Cardiovascular:      Rate and Rhythm: Regular rhythm. Bradycardia present. Pulses: Normal pulses. Pulmonary:      Effort: Pulmonary effort is normal. No tachypnea. Breath sounds: Normal breath sounds. No wheezing, rhonchi or rales. Abdominal:      General: Bowel sounds are normal.      Palpations: Abdomen is soft. Musculoskeletal:         General: Normal range of motion. Cervical back: Normal range of motion and neck supple. Skin:     General: Skin is warm and dry. Capillary Refill: Capillary refill takes less than 2 seconds. Coloration: Skin is not pale. Neurological:      Mental Status: She is alert and oriented to person, place, and time. Coordination: Coordination normal.      Gait: Gait normal.   Psychiatric:         Mood and Affect: Mood normal.         Thought Content: Thought content normal.           MEDICAL DECISION MAKING Assessment/Plan:     Tim Estrada was seen today for nasal congestion and pharyngitis. Diagnoses and all orders for this visit:    Acute bronchitis, unspecified organism  -     azithromycin (ZITHROMAX) 250 MG tablet; Take 2 tablets (500 mg) on Day 1, followed by 1 tablet (250 mg) once daily on Days 2 through 5. Cough  -     benzonatate (TESSALON PERLES) 100 MG capsule; Take 1 capsule by mouth 3 times daily as needed for Cough        Results for orders placed or performed during the hospital encounter of 08/09/21   COVID-19    Specimen: Nasopharyngeal Swab   Result Value Ref Range    SARS-CoV-2          Source . NASOPHARYNGEAL SWAB     SARS-CoV-2 Not Detected Not Detected     Based on the patient's history and exam will treat as bronchitis. The patient does not have clinical findings suggestive of pneumonia.   There is no abnormal vital signs (pulse is not greater than 100/ minute, respirations are not greater than 24/ minute, temperature is not greater than 38 degrees Celsius, or oxygen saturation less than 95 percent) There is no tachypnea, rales, or signs of parenchymal consolidation on exam. There are no changes in mental status or behavioral changes. Pt to fill and take medications as prescribed. Rest, increase fluids. Return if no improvement in symptoms. Go to the ER for any emergent concern. Patient given educational materials - see patientinstructions. Discussed use, benefit, and side effects of prescribed medications. All patient questions answered. Pt verbalized understanding. Instructed to continue current medications, diet and exercise. Patient agreed with treatment plan. Follow up as directed.      Electronically signed by DREW Martin CNP on 5/20/2022 at 4:26 PM

## 2022-05-20 NOTE — PATIENT INSTRUCTIONS
Patient Education        Bronchitis: Care Instructions  Your Care Instructions     Bronchitis is inflammation of the bronchial tubes, which carry air to the lungs. The tubes swell and produce mucus, or phlegm. The mucus and inflamedbronchial tubes make you cough. You may have trouble breathing. Most cases of bronchitis are caused by viruses like those that cause colds. Antibiotics usually do not help and they may be harmful. Bronchitis usually develops rapidly and lasts about 2 to 3 weeks in otherwisehealthy people. Follow-up care is a key part of your treatment and safety. Be sure to make and go to all appointments, and call your doctor if you are having problems. It's also a good idea to know your test results and keep alist of the medicines you take. How can you care for yourself at home?  Take all medicines exactly as prescribed. Call your doctor if you think you are having a problem with your medicine.  Get some extra rest.   Take an over-the-counter pain medicine, such as acetaminophen (Tylenol), ibuprofen (Advil, Motrin), or naproxen (Aleve) to reduce fever and relieve body aches. Read and follow all instructions on the label.  Do not take two or more pain medicines at the same time unless the doctor told you to. Many pain medicines have acetaminophen, which is Tylenol. Too much acetaminophen (Tylenol) can be harmful.  Take an over-the-counter cough medicine to help quiet a dry, hacking cough so that you can sleep. Avoid cough medicines that have more than one active ingredient. Read and follow all instructions on the label.  Do not smoke. Smoking can make bronchitis worse. If you need help quitting, talk to your doctor about stop-smoking programs and medicines. These can increase your chances of quitting for good. When should you call for help? Call 911 anytime you think you may need emergency care. For example, call if:     You have severe trouble breathing.    Call your doctor now or seek immediate medical care if:     You have new or worse trouble breathing.      You cough up dark brown or bloody mucus (sputum).      You have a new or higher fever.      You have a new rash. Watch closely for changes in your health, and be sure to contact your doctor if:     You cough more deeply or more often, especially if you notice more mucus or a change in the color of your mucus.      You are not getting better as expected. Where can you learn more? Go to [email or web address] org and sign in to your [employer]ph[employer] account. Enter H333 in the [employer] E2[employer] box to learn more about \"Bronchitis: Care Instructions. \"     If you do not have an account, please click on the \"Sign Up Now\" link. Current as of: July 6, 2021               Content Version: 13.2  © 2074-0250 [employer], [employer]orporated. Care instructions adapted under license by [facility] ([facility]). If you have questions about a medical condition or this instruction, always ask your healthcare professional. [address] any warranty or liability for your use of this information.

## 2022-06-11 DIAGNOSIS — F41.9 ANXIETY: ICD-10-CM

## 2022-06-13 RX ORDER — HYDROXYZINE HYDROCHLORIDE 25 MG/1
TABLET, FILM COATED ORAL
Qty: 30 TABLET | Refills: 0 | Status: SHIPPED | OUTPATIENT
Start: 2022-06-13 | End: 2022-07-12

## 2022-07-12 DIAGNOSIS — F41.9 ANXIETY: ICD-10-CM

## 2022-07-12 RX ORDER — HYDROXYZINE HYDROCHLORIDE 25 MG/1
TABLET, FILM COATED ORAL
Qty: 30 TABLET | Refills: 0 | Status: SHIPPED | OUTPATIENT
Start: 2022-07-12 | End: 2022-07-14 | Stop reason: SDUPTHER

## 2022-07-14 DIAGNOSIS — F41.9 ANXIETY: ICD-10-CM

## 2022-07-14 RX ORDER — HYDROXYZINE HYDROCHLORIDE 25 MG/1
TABLET, FILM COATED ORAL
Qty: 90 TABLET | Refills: 0 | Status: SHIPPED | OUTPATIENT
Start: 2022-07-14 | End: 2022-10-20

## 2022-09-16 ENCOUNTER — TELEPHONE (OUTPATIENT)
Dept: FAMILY MEDICINE CLINIC | Age: 72
End: 2022-09-16

## 2022-09-16 NOTE — TELEPHONE ENCOUNTER
----- Message from Esther Palumbo sent at 9/16/2022  2:55 PM EDT -----  Subject: Message to Provider    QUESTIONS  Information for Provider? Patient wants to know if the appointment she has   on 10/17 can be for her AWV. Please advise patient about this.  ---------------------------------------------------------------------------  --------------  Any QUINTANA  [phone]; OK to leave message on voicemail  ---------------------------------------------------------------------------  --------------  SCRIPT ANSWERS  Relationship to Patient?  Self

## 2022-10-10 DIAGNOSIS — I25.10 ATHEROSCLEROSIS OF NATIVE CORONARY ARTERY WITHOUT ANGINA PECTORIS, UNSPECIFIED WHETHER NATIVE OR TRANSPLANTED HEART: ICD-10-CM

## 2022-10-10 RX ORDER — ASPIRIN 81 MG/1
TABLET ORAL
Qty: 90 TABLET | Refills: 1 | Status: SHIPPED | OUTPATIENT
Start: 2022-10-10

## 2022-10-10 RX ORDER — ROSUVASTATIN CALCIUM 5 MG/1
5 TABLET, COATED ORAL NIGHTLY
Qty: 90 TABLET | Refills: 1 | Status: SHIPPED | OUTPATIENT
Start: 2022-10-10

## 2022-10-12 SDOH — HEALTH STABILITY: PHYSICAL HEALTH: ON AVERAGE, HOW MANY MINUTES DO YOU ENGAGE IN EXERCISE AT THIS LEVEL?: 150+ MIN

## 2022-10-12 SDOH — HEALTH STABILITY: PHYSICAL HEALTH: ON AVERAGE, HOW MANY DAYS PER WEEK DO YOU ENGAGE IN MODERATE TO STRENUOUS EXERCISE (LIKE A BRISK WALK)?: 2 DAYS

## 2022-10-12 ASSESSMENT — PATIENT HEALTH QUESTIONNAIRE - PHQ9
SUM OF ALL RESPONSES TO PHQ QUESTIONS 1-9: 1
SUM OF ALL RESPONSES TO PHQ9 QUESTIONS 1 & 2: 1
2. FEELING DOWN, DEPRESSED OR HOPELESS: 0
1. LITTLE INTEREST OR PLEASURE IN DOING THINGS: 1
SUM OF ALL RESPONSES TO PHQ QUESTIONS 1-9: 1

## 2022-10-12 ASSESSMENT — LIFESTYLE VARIABLES
HOW OFTEN DO YOU HAVE A DRINK CONTAINING ALCOHOL: 2
HOW OFTEN DO YOU HAVE A DRINK CONTAINING ALCOHOL: MONTHLY OR LESS
HOW MANY STANDARD DRINKS CONTAINING ALCOHOL DO YOU HAVE ON A TYPICAL DAY: 1 OR 2
HOW OFTEN DO YOU HAVE SIX OR MORE DRINKS ON ONE OCCASION: 98
HOW MANY STANDARD DRINKS CONTAINING ALCOHOL DO YOU HAVE ON A TYPICAL DAY: 1

## 2022-10-18 ENCOUNTER — OFFICE VISIT (OUTPATIENT)
Dept: FAMILY MEDICINE CLINIC | Age: 72
End: 2022-10-18
Payer: MEDICARE

## 2022-10-18 ENCOUNTER — TELEPHONE (OUTPATIENT)
Dept: FAMILY MEDICINE CLINIC | Age: 72
End: 2022-10-18

## 2022-10-18 VITALS
HEART RATE: 56 BPM | DIASTOLIC BLOOD PRESSURE: 80 MMHG | TEMPERATURE: 97.1 F | SYSTOLIC BLOOD PRESSURE: 130 MMHG | WEIGHT: 161.4 LBS | BODY MASS INDEX: 29.7 KG/M2 | RESPIRATION RATE: 16 BRPM | HEIGHT: 62 IN

## 2022-10-18 DIAGNOSIS — E55.9 VITAMIN D DEFICIENCY: ICD-10-CM

## 2022-10-18 DIAGNOSIS — K21.9 GASTROESOPHAGEAL REFLUX DISEASE WITHOUT ESOPHAGITIS: ICD-10-CM

## 2022-10-18 DIAGNOSIS — E78.2 MIXED HYPERLIPIDEMIA: Primary | ICD-10-CM

## 2022-10-18 DIAGNOSIS — Z00.00 MEDICARE ANNUAL WELLNESS VISIT, SUBSEQUENT: Primary | ICD-10-CM

## 2022-10-18 DIAGNOSIS — R79.89 ELEVATED LFTS: ICD-10-CM

## 2022-10-18 PROCEDURE — G0439 PPPS, SUBSEQ VISIT: HCPCS | Performed by: FAMILY MEDICINE

## 2022-10-18 PROCEDURE — G8484 FLU IMMUNIZE NO ADMIN: HCPCS | Performed by: FAMILY MEDICINE

## 2022-10-18 PROCEDURE — 3017F COLORECTAL CA SCREEN DOC REV: CPT | Performed by: FAMILY MEDICINE

## 2022-10-18 PROCEDURE — 1123F ACP DISCUSS/DSCN MKR DOCD: CPT | Performed by: FAMILY MEDICINE

## 2022-10-18 NOTE — PATIENT INSTRUCTIONS
Personalized Preventive Plan for Becky Goodell - 10/18/2022  Medicare offers a range of preventive health benefits. Some of the tests and screenings are paid in full while other may be subject to a deductible, co-insurance, and/or copay. Some of these benefits include a comprehensive review of your medical history including lifestyle, illnesses that may run in your family, and various assessments and screenings as appropriate. After reviewing your medical record and screening and assessments performed today your provider may have ordered immunizations, labs, imaging, and/or referrals for you. A list of these orders (if applicable) as well as your Preventive Care list are included within your After Visit Summary for your review. Other Preventive Recommendations:    A preventive eye exam performed by an eye specialist is recommended every 1-2 years to screen for glaucoma; cataracts, macular degeneration, and other eye disorders. A preventive dental visit is recommended every 6 months. Try to get at least 150 minutes of exercise per week or 10,000 steps per day on a pedometer . Order or download the FREE \"Exercise & Physical Activity: Your Everyday Guide\" from [employer]. Call 3-335.937.2684 or search [employer] online. You need 7911-7310 mg of calcium and 3503-6591 IU of vitamin D per day. It is possible to meet your calcium requirement with diet alone, but a vitamin D supplement is usually necessary to meet this goal.  When exposed to the sun, use a sunscreen that protects against both UVA and UVB radiation with an SPF of 30 or greater. Reapply every 2 to 3 hours or after sweating, drying off with a towel, or swimming. Always wear a seat belt when traveling in a car. Always wear a helmet when riding a bicycle or motorcycle. Heart-Healthy Diet   Sodium, Fat, and Cholesterol Controlled Diet       What Is a Heart Healthy Diet?    A heart-healthy diet is one that limits sodium , certain types of fat , and cholesterol . This type of diet is recommended for:   People with any form of cardiovascular disease (eg, coronary heart disease , peripheral vascular disease , previous heart attack , previous stroke )   People with risk factors for cardiovascular disease, such as high blood pressure , high cholesterol , or diabetes   Anyone who wants to lower their risk of developing cardiovascular disease   Sodium    Sodium is a mineral found in many foods. In general, most people consume much more sodium than they need. Diets high in sodium can increase blood pressure and lead to edema (water retention). On a heart-healthy diet, you should consume no more than 2,300 mg (milligrams) of sodium per dayabout the amount in one teaspoon of table salt. The foods highest in sodium include table salt (about 50% sodium), processed foods, convenience foods, and preserved foods. Cholesterol    Cholesterol is a fat-like, waxy substance in your blood. Our bodies make some cholesterol. It is also found in animal products, with the highest amounts in fatty meat, egg yolks, whole milk, cheese, shellfish, and organ meats. On a heart-healthy diet, you should limit your cholesterol intake to less than 200 mg per day. It is normal and important to have some cholesterol in your bloodstream. But too much cholesterol can cause plaque to build up within your arteries, which can eventually lead to a heart attack or stroke. The two types of cholesterol that are most commonly referred to are:   Low-density lipoprotein (LDL) cholesterol  Also known as bad cholesterol, this is the cholesterol that tends to build up along your arteries. Bad cholesterol levels are increased by eating fats that are saturated or hydrogenated. Optimal level of this cholesterol is less than 100. Over 130 starts to get risky for heart disease.    High-density lipoprotein (HDL) cholesterol  Also known as good cholesterol, this type of cholesterol actually carries cholesterol away from your arteries and may, therefore, help lower your risk of having a heart attack. You want this level to be high (ideally greater than 60). It is a risk to have a level less than 40. You can raise this good cholesterol by eating olive oil, canola oil, avocados, or nuts. Exercise raises this level, too. Fat    Fat is calorie dense and packs a lot of calories into a small amount of food. Even though fats should be limited due to their high calorie content, not all fats are bad. In fact, some fats are quite healthful. Fat can be broken down into four main types. The good-for-you fats are:   Monounsaturated fat  found in oils such as olive and canola, avocados, and nuts and natural nut butters; can decrease cholesterol levels, while keeping levels of HDL cholesterol high   Polyunsaturated fat  found in oils such as safflower, sunflower, soybean, corn, and sesame; can decrease total cholesterol and LDL cholesterol   Omega-3 fatty acids  particularly those found in fatty fish (such as salmon, trout, tuna, mackerel, herring, and sardines); can decrease risk of arrhythmias, decrease triglyceride levels, and slightly lower blood pressure   The fats that you want to limit are:   Saturated fat  found in animal products, many fast foods, and a few vegetables; increases total blood cholesterol, including LDL levels   Animal fats that are saturated include: butter, lard, whole-milk dairy products, meat fat, and poultry skin   Vegetable fats that are saturated include: hydrogenated shortening, palm oil, coconut oil, cocoa butter   Hydrogenated or trans fat  found in margarine and vegetable shortening, most shelf stable snack foods, and fried foods; increases LDL and decreases HDL     It is generally recommended that you limit your total fat for the day to less than 30% of your total calories.  If you follow an 1800-calorie heart healthy diet, for example, this would mean 60 grams of fat or less per day. Saturated fat and trans fat in your diet raises your blood cholesterol the most, much more than dietary cholesterol does. For this reason, on a heart-healthy diet, less than 7% of your calories should come from saturated fat and ideally 0% from trans fat. On an 1800-calorie diet, this translates into less than 14 grams of saturated fat per day, leaving 46 grams of fat to come from mono- and polyunsaturated fats.    Food Choices on a Heart Healthy Diet   Food Category   Foods Recommended   Foods to Avoid   Grains   Breads and rolls without salted tops Most dry and cooked cereals Unsalted crackers and breadsticks Low-sodium or homemade breadcrumbs or stuffing All rice and pastas   Breads, rolls, and crackers with salted tops High-fat baked goods (eg, muffins, donuts, pastries) Quick breads, self-rising flour, and biscuit mixes Regular bread crumbs Instant hot cereals Commercially prepared rice, pasta, or stuffing mixes   Vegetables   Most fresh, frozen, and low-sodium canned vegetables Low-sodium and salt-free vegetable juices Canned vegetables if unsalted or rinsed   Regular canned vegetables and juices, including sauerkraut and pickled vegetables Frozen vegetables with sauces Commercially prepared potato and vegetable mixes   Fruits   Most fresh, frozen, and canned fruits All fruit juices   Fruits processed with salt or sodium   Milk   Nonfat or low-fat (1%) milk Nonfat or low-fat yogurt Cottage cheese, low-fat ricotta, cheeses labeled as low-fat and low-sodium   Whole milk Reduced-fat (2%) milk Malted and chocolate milk Full fat yogurt Most cheeses (unless low-fat and low salt) Buttermilk (no more than 1 cup per week)   Meats and Beans   Lean cuts of fresh or frozen beef, veal, lamb, or pork (look for the word loin) Fresh or frozen poultry without the skin Fresh or frozen fish and some shellfish Egg whites and egg substitutes (Limit whole eggs to three per week) Tofu Nuts or seeds (unsalted, dry-roasted), low-sodium peanut butter Dried peas, beans, and lentils   Any smoked, cured, salted, or canned meat, fish, or poultry (including weaver, chipped beef, cold cuts, hot dogs, sausages, sardines, and anchovies) Poultry skins Breaded and/or fried fish or meats Canned peas, beans, and lentils Salted nuts   Fats and Oils   Olive oil and canola oil Low-sodium, low-fat salad dressings and mayonnaise   Butter, margarine, coconut and palm oils, weaver fat   Snacks, Sweets, and Condiments   Low-sodium or unsalted versions of broths, soups, soy sauce, and condiments Pepper, herbs, and spices; vinegar, lemon, or lime juice Low-fat frozen desserts (yogurt, sherbet, fruit bars) Sugar, cocoa powder, honey, syrup, jam, and preserves Low-fat, trans-fat free cookies, cakes, and pies Daniel and animal crackers, fig bars, dallas snaps   High-fat desserts Broth, soups, gravies, and sauces, made from instant mixes or other high-sodium ingredients Salted snack foods Canned olives Meat tenderizers, seasoning salt, and most flavored vinegars   Beverages   Low-sodium carbonated beverages Tea and coffee in moderation Soy milk   Commercially softened water   Suggestions   Make whole grains, fruits, and vegetables the base of your diet. Choose heart-healthy fats such as canola, olive, and flaxseed oil, and foods high in heart-healthy fats, such as nuts, seeds, soybeans, tofu, and fish. Eat fish at least twice per week; the fish highest in omega-3 fatty acids and lowest in mercury include salmon, herring, mackerel, sardines, and canned chunk light tuna. If you eat fish less than twice per week or have high triglycerides, talk to your doctor about taking fish oil supplements. Read food labels. For products low in fat and cholesterol, look for fat free, low-fat, cholesterol free, saturated fat free, and trans fat freeAlso scan the Nutrition Facts Label, which lists saturated fat, trans fat, and cholesterol amounts. For products low in sodium, look for sodium free, very low sodium, low sodium, no added salt, and unsalted   Skip the salt when cooking or at the table; if food needs more flavor, get creative and try out different herbs and spices. Garlic and onion also add substantial flavor to foods. Trim any visible fat off meat and poultry before cooking, and drain the fat off after tompkins. Use cooking methods that require little or no added fat, such as grilling, boiling, baking, poaching, broiling, roasting, steaming, stir-frying, and sauting. Avoid fast food and convenience food. They tend to be high in saturated and trans fat and have a lot of added salt. Talk to a registered dietitian for individualized diet advice.       Last Reviewed: March 2011 Domo Tijerina MS, MPH, RD   Updated: 3/29/2011

## 2022-10-18 NOTE — PROGRESS NOTES
Medicare Annual Wellness Visit    Claudia Wolfe is here for Medicare AW    Assessment & Plan    Recommendations for Preventive Services Due: see orders and patient instructions/AVS.  Recommended screening schedule for the next 5-10 years is provided to the patient in written form: see Patient Instructions/AVS.     No follow-ups on file. Subjective       Patient's complete Health Risk Assessment and screening values have been reviewed and are found in Flowsheets. The following problems were reviewed today and where indicated follow up appointments were made and/or referrals ordered. Positive Risk Factor Screenings with Interventions:             General Health and ACP:  General  In general, how would you say your health is?: Good  In the past 7 days, have you experienced any of the following: New or Increased Pain, New or Increased Fatigue, Loneliness, Social Isolation, Stress or Anger?: (!) Yes  Select all that apply: (!) Anger  Do you get the social and emotional support that you need?: Yes  Do you have a Living Will?: Yes    Advance Directives       Power of  Living Will ACP-Advance Directive ACP-Power of     Not on File Not on File Not on File Not on File        General Health Risk Interventions:  Anger: regular exercise recommended- 3-5 times per week, 30-45 minutes per session  She may resume weight watchers. She will think about seeing a sleep specialist for evaluation of possible sleep issues. Objective   Vitals:    10/18/22 1306   BP: 130/80   Pulse: 56   Resp: 16   Temp: 97.1 °F (36.2 °C)   Weight: 161 lb 6.4 oz (73.2 kg)   Height: 5' 1.75\" (1.568 m)      Body mass index is 29.76 kg/m². Allergies   Allergen Reactions    Nalbuphine      Other reaction(s): claustophobia; confusion    Sulfa Antibiotics Hives    Formaldehyde Rash    Nickel Rash    Morphine And Related Other (See Comments)    Oxycodone      Prior to Visit Medications    Medication Sig Taking? Authorizing Provider   rosuvastatin (CRESTOR) 5 MG tablet TAKE 1 TABLET BY MOUTH NIGHTLY Yes Patsy Goel MD   aspirin 81 MG EC tablet TAKE 1 TABLET BY MOUTH EVERY DAY Yes Patsy Goel MD   hydrOXYzine HCl (ATARAX) 25 MG tablet TAKE 1 TABLET BY MOUTH EVERY DAY AS NEEDED FOR ANXIETY Yes DREW Lyon CNP   pantoprazole (PROTONIX) 40 MG tablet Take 1 tablet by mouth daily Yes DREW Jara NP   ZINC PO Take by mouth Yes Historical Provider, MD   albuterol sulfate HFA (PROAIR HFA) 108 (90 Base) MCG/ACT inhaler Inhale 2 puffs into the lungs every 6 hours as needed for Wheezing Yes DREW Lyon CNP   diclofenac sodium (VOLTAREN) 1 % GEL Apply topically 2 times daily Yes DREW Lyon CNP   vitamin C (ASCORBIC ACID) 500 MG tablet Take 500 mg by mouth daily Yes Historical Provider, MD   Coenzyme Q10 (COQ10 PO) Take by mouth Yes Historical Provider, MD   Multiple Vitamins-Minerals (MULTIVITAMIN PO) Take by mouth Yes Historical Provider, MD   vitamin D (ERGOCALCIFEROL) 400 UNITS CAPS Take 400 Units by mouth daily Yes Historical Provider, MD   calcium carbonate-vitamin D 600-200 MG-UNIT TABS Take  by mouth.  Yes Historical Provider, MD   valACYclovir (VALTREX) 1 g tablet Take 1,000 mg by mouth 2 times daily Patient uses PRN  Patient not taking: Reported on 10/18/2022  Historical Provider, MD   Cyanocobalamin (VITAMIN B-12 CR PO) Take by mouth  Patient not taking: Reported on 10/18/2022  Historical Provider, MD [place] (Including outside providers/suppliers regularly involved in providing care):   Patient Care Team:  DREW Lyon CNP as PCP - General (Family Nurse Practitioner)  DREW Lyon CNP as PCP - [facility] Empaneled Provider  Rodney Lin MD as Consulting Physician (Gastroenterology)  Shay Davis MD (Dermatology)  Robert Hanna MD as Consulting Physician (Oral Maxillofacial Surgery)     Reviewed and updated this visit:  Tobacco  Allergies  Meds Med Hx  Surg Hx  Soc Hx  Fam Hx            I, Iraida Landrum RN, 10/18/2022, performed the documented evaluation under the direct supervision of the attending physician. This encounter was performed under myDarren MDs, direct supervision, 10/18/2022.

## 2022-10-18 NOTE — TELEPHONE ENCOUNTER
The patient was seen for AWV but is due to get labs done. Her last labs were ordered by Kate Blair CNP in 06/21. Please call the patient when lab orders have been placed.

## 2022-10-19 DIAGNOSIS — F41.9 ANXIETY: ICD-10-CM

## 2022-10-20 RX ORDER — HYDROXYZINE HYDROCHLORIDE 25 MG/1
TABLET, FILM COATED ORAL
Qty: 90 TABLET | Refills: 0 | Status: SHIPPED | OUTPATIENT
Start: 2022-10-20

## 2022-10-21 RX ORDER — PANTOPRAZOLE SODIUM 40 MG/1
TABLET, DELAYED RELEASE ORAL
Qty: 90 TABLET | Refills: 3 | OUTPATIENT
Start: 2022-10-21

## 2022-10-27 ENCOUNTER — HOSPITAL ENCOUNTER (OUTPATIENT)
Dept: MAMMOGRAPHY | Age: 72
Discharge: HOME | End: 2022-10-29
Payer: MEDICARE

## 2022-10-27 DIAGNOSIS — Z12.31 ENCOUNTER FOR SCREENING MAMMOGRAM FOR BREAST CANCER: ICD-10-CM

## 2022-10-27 PROCEDURE — 77067 SCR MAMMO BI INCL CAD: CPT

## 2022-11-30 NOTE — TELEPHONE ENCOUNTER
Pt called to get her acid reflux med refilled. She has an appt in January but she is completley out.

## 2022-12-01 RX ORDER — PANTOPRAZOLE SODIUM 40 MG/1
40 TABLET, DELAYED RELEASE ORAL DAILY
Qty: 90 TABLET | Refills: 3 | Status: SHIPPED | OUTPATIENT
Start: 2022-12-01

## 2023-01-05 ENCOUNTER — HOSPITAL ENCOUNTER (OUTPATIENT)
Age: 73
Setting detail: SPECIMEN
Discharge: HOME | End: 2023-01-05

## 2023-01-05 ENCOUNTER — OFFICE VISIT (OUTPATIENT)
Dept: FAMILY MEDICINE CLINIC | Age: 73
End: 2023-01-05

## 2023-01-05 VITALS
OXYGEN SATURATION: 98 % | RESPIRATION RATE: 16 BRPM | BODY MASS INDEX: 29.72 KG/M2 | SYSTOLIC BLOOD PRESSURE: 138 MMHG | DIASTOLIC BLOOD PRESSURE: 76 MMHG | TEMPERATURE: 97.4 F | WEIGHT: 161.2 LBS | HEART RATE: 92 BPM

## 2023-01-05 DIAGNOSIS — Z11.52 ENCOUNTER FOR SCREENING FOR COVID-19: ICD-10-CM

## 2023-01-05 DIAGNOSIS — J40 BRONCHITIS: Primary | ICD-10-CM

## 2023-01-05 LAB
INFLUENZA A ANTIBODY: NEGATIVE
INFLUENZA B ANTIBODY: NEGATIVE

## 2023-01-05 RX ORDER — BENZONATATE 200 MG/1
200 CAPSULE ORAL 3 TIMES DAILY PRN
Qty: 30 CAPSULE | Refills: 1 | Status: SHIPPED | OUTPATIENT
Start: 2023-01-05 | End: 2023-01-05

## 2023-01-05 RX ORDER — BENZONATATE 200 MG/1
200 CAPSULE ORAL 3 TIMES DAILY PRN
Qty: 30 CAPSULE | Refills: 1 | Status: SHIPPED | OUTPATIENT
Start: 2023-01-05 | End: 2023-01-25

## 2023-01-05 ASSESSMENT — PATIENT HEALTH QUESTIONNAIRE - PHQ9
1. LITTLE INTEREST OR PLEASURE IN DOING THINGS: 0
SUM OF ALL RESPONSES TO PHQ QUESTIONS 1-9: 0
2. FEELING DOWN, DEPRESSED OR HOPELESS: 0
SUM OF ALL RESPONSES TO PHQ QUESTIONS 1-9: 0
SUM OF ALL RESPONSES TO PHQ9 QUESTIONS 1 & 2: 0

## 2023-01-05 ASSESSMENT — ENCOUNTER SYMPTOMS
SHORTNESS OF BREATH: 0
SINUS PAIN: 0
GASTROINTESTINAL NEGATIVE: 1
SORE THROAT: 0
COUGH: 1
VOICE CHANGE: 0
WHEEZING: 0

## 2023-01-06 DIAGNOSIS — Z11.52 ENCOUNTER FOR SCREENING FOR COVID-19: ICD-10-CM

## 2023-01-06 DIAGNOSIS — J40 BRONCHITIS: ICD-10-CM

## 2023-01-07 LAB
SARS-COV-2: NORMAL
SARS-COV-2: NOT DETECTED
SOURCE: NORMAL

## 2023-01-09 ENCOUNTER — TELEPHONE (OUTPATIENT)
Dept: GASTROENTEROLOGY | Age: 73
End: 2023-01-09

## 2023-01-09 ENCOUNTER — OFFICE VISIT (OUTPATIENT)
Dept: GASTROENTEROLOGY | Age: 73
End: 2023-01-09
Payer: MEDICARE

## 2023-01-09 VITALS
HEIGHT: 62 IN | WEIGHT: 162 LBS | HEART RATE: 58 BPM | SYSTOLIC BLOOD PRESSURE: 131 MMHG | BODY MASS INDEX: 29.81 KG/M2 | DIASTOLIC BLOOD PRESSURE: 71 MMHG

## 2023-01-09 DIAGNOSIS — K58.9 IRRITABLE BOWEL SYNDROME, UNSPECIFIED TYPE: ICD-10-CM

## 2023-01-09 DIAGNOSIS — M34.9 SCLERODERMA (HCC): ICD-10-CM

## 2023-01-09 DIAGNOSIS — K21.9 GASTROESOPHAGEAL REFLUX DISEASE, UNSPECIFIED WHETHER ESOPHAGITIS PRESENT: Primary | ICD-10-CM

## 2023-01-09 PROCEDURE — 1090F PRES/ABSN URINE INCON ASSESS: CPT | Performed by: INTERNAL MEDICINE

## 2023-01-09 PROCEDURE — G8427 DOCREV CUR MEDS BY ELIG CLIN: HCPCS | Performed by: INTERNAL MEDICINE

## 2023-01-09 PROCEDURE — 1123F ACP DISCUSS/DSCN MKR DOCD: CPT | Performed by: INTERNAL MEDICINE

## 2023-01-09 PROCEDURE — 3017F COLORECTAL CA SCREEN DOC REV: CPT | Performed by: INTERNAL MEDICINE

## 2023-01-09 PROCEDURE — G8399 PT W/DXA RESULTS DOCUMENT: HCPCS | Performed by: INTERNAL MEDICINE

## 2023-01-09 PROCEDURE — G8484 FLU IMMUNIZE NO ADMIN: HCPCS | Performed by: INTERNAL MEDICINE

## 2023-01-09 PROCEDURE — 99214 OFFICE O/P EST MOD 30 MIN: CPT | Performed by: INTERNAL MEDICINE

## 2023-01-09 PROCEDURE — G8417 CALC BMI ABV UP PARAM F/U: HCPCS | Performed by: INTERNAL MEDICINE

## 2023-01-09 PROCEDURE — 1036F TOBACCO NON-USER: CPT | Performed by: INTERNAL MEDICINE

## 2023-01-09 RX ORDER — HYDROXYCHLOROQUINE SULFATE 200 MG/1
TABLET, FILM COATED ORAL
COMMUNITY
Start: 2022-10-26

## 2023-01-09 ASSESSMENT — ENCOUNTER SYMPTOMS
SORE THROAT: 1
GASTROINTESTINAL NEGATIVE: 1
ALLERGIC/IMMUNOLOGIC NEGATIVE: 1
RESPIRATORY NEGATIVE: 1

## 2023-01-09 NOTE — PROGRESS NOTES
GI OFFICE FOLLOW UP    INTERVAL HISTORY:   No referring provider defined for this encounter. Chief Complaint   Patient presents with    Gastroesophageal Reflux     Pt here for yearly f/u, pt has been on protonix. Pt states she went a month without it and started to get sick. But she takes it every other day and it works        1. Gastroesophageal reflux disease, unspecified whether esophagitis present    2. Scleroderma ([address]    3. Irritable bowel syndrome, unspecified type              HISTORY OF PRESENT ILLNESS:   Patient seen for follow-up of chronic problems. Patient stated that she has heartburns for several decades. Her last EGD was in 2014 and at that time she was found to have esophagitis. Since then patient was diagnosed to have scleroderma. At present she denies dysphagia. She gained about 20 pounds in the last year. Bowel movements satisfactory. Denies abdominal pain, bloating, nausea vomiting etc.  Her last colonoscopy was in 2018. Past Medical,Family, and Social History reviewed and does contribute to the patient presenting condition. Patient's PMH/PSH,SH,PSYCH Hx, MEDs, ALLERGIES, and ROS were all reviewed and updated in the appropriate sections.  Yes      PAST MEDICAL HISTORY:  Past Medical History:   Diagnosis Date    Anxiety     Diverticulosis of colon     DJD (degenerative joint disease) 1/7/2015    Family history of colon cancer     Generalized OA 1/7/2015    GERD (gastroesophageal reflux disease)     Hyperlipidemia 1/7/2015    Irritable bowel syndrome     Osteoarthritis        Past Surgical History:   Procedure Laterality Date    CHOLECYSTECTOMY      COLONOSCOPY  2004    normal    COLONOSCOPY  4/25/14    diverticulosis    COLONOSCOPY N/A 2/9/2018    COLONOSCOPY POLYPECTOMY COLD BIOP[place] [address] [place] [address]  02[place]09/2018    Spasms in the sigmoid colon. No colitis or ileitis seen.     DILATION AND CURETTAGE OF UTERUS  1970    DILATION AND CURETTAGE OF UTERUS  1991    ENDOSCOPY, COLON, DIAGNOSTIC      JOINT REPLACEMENT Right 8/26/2013    hip    TONSILLECTOMY  1954    UPPER GASTROINTESTINAL ENDOSCOPY  2004    normal    UPPER GASTROINTESTINAL ENDOSCOPY  4/25/14    esophagitis with bx       CURRENT MEDICATIONS:    Current Outpatient Medications:     hydroxychloroquine (PLAQUENIL) 200 MG tablet, 1 TABLET WITH FOOD OR MILK ORALLY DAILY AT BEDTIME 90 DAYS, Disp: , Rfl:     benzonatate (TESSALON) 200 MG capsule, Take 1 capsule by mouth 3 times daily as needed for Cough, Disp: 30 capsule, Rfl: 1    pantoprazole (PROTONIX) 40 MG tablet, Take 1 tablet by mouth daily, Disp: 90 tablet, Rfl: 3    hydrOXYzine HCl (ATARAX) 25 MG tablet, TAKE 1 TABLET BY MOUTH EVERY DAY AS NEEDED FOR ANXIETY, Disp: 90 tablet, Rfl: 0    rosuvastatin (CRESTOR) 5 MG tablet, TAKE 1 TABLET BY MOUTH NIGHTLY, Disp: 90 tablet, Rfl: 1    aspirin 81 MG EC tablet, TAKE 1 TABLET BY MOUTH EVERY DAY, Disp: 90 tablet, Rfl: 1    ZINC PO, Take by mouth, Disp: , Rfl:     albuterol sulfate HFA (PROAIR HFA) 108 (90 Base) MCG/ACT inhaler, Inhale 2 puffs into the lungs every 6 hours as needed for Wheezing, Disp: 1 Inhaler, Rfl: 2    diclofenac sodium (VOLTAREN) 1 % GEL, Apply topically 2 times daily, Disp: 100 g, Rfl: 0    vitamin C (ASCORBIC ACID) 500 MG tablet, Take 500 mg by mouth daily, Disp: , Rfl:     valACYclovir (VALTREX) 1 g tablet, Take 1,000 mg by mouth 2 times daily Patient uses PRN, Disp: , Rfl:     Coenzyme Q10 (COQ10 PO), Take by mouth, Disp: , Rfl:     Multiple Vitamins-Minerals (MULTIVITAMIN PO), Take by mouth, Disp: , Rfl:     vitamin D (ERGOCALCIFEROL) 400 UNITS CAPS, Take 400 Units by mouth daily, Disp: , Rfl:     calcium carbonate-vitamin D 600-200 MG-UNIT TABS, Take  by mouth., Disp: , Rfl:     Cyanocobalamin (VITAMIN B-12 CR PO), Take by mouth (Patient not taking: No sig reported), Disp: , Rfl:     ALLERGIES:   Allergies   Allergen Reactions    Nalbuphine      Other reaction(s): claustophobia; confusion    Sulfa Antibiotics Hives    Formaldehyde Rash    Nickel Rash    Morphine And Related Other (See Comments)    Oxycodone        FAMILY HISTORY:       Problem Relation Age of Onset    Other Mother         had many stomach surgeries    COPD Father     Cancer Father         colon    Other Brother         sepsis    Cancer Maternal Grandfather         colon         SOCIAL HISTORY:   Social History     Socioeconomic History    Marital status:      Spouse name: Not on file    Number of children: Not on file    Years of education: Not on file    Highest education level: Not on file   Occupational History    Not on file   Tobacco Use    Smoking status: Former     Packs/day: 1.50     Years: 20.00     Pack years: 30.00     Types: Cigarettes     Quit date: 1990     Years since quittin.0    Smokeless tobacco: Never   Vaping Use    Vaping Use: Never used   Substance and Sexual Activity    Alcohol use: Yes    Drug use: No    Sexual activity: Not Currently   Other Topics Concern    Not on file   Social History Narrative    Not on file     Social Determinants of Health     Financial Resource Strain: Low Risk     Difficulty of Paying Living Expenses: Not hard at all   Food Insecurity: No Food Insecurity    Worried About Running Out of Food in the Last Year: Never true    Ran Out of Food in the Last Year: Never true   Transportation Needs: Not on file   Physical Activity: Sufficiently Active    Days of Exercise per Week: 2 days    Minutes of Exercise per Session: 150+ min   Stress: Not on file   Social Connections: Not on file   Intimate Partner Violence: Not on file   Housing Stability: Not on file         REVIEW OF SYSTEMS:         Review of Systems   Constitutional: Negative. HENT:  Positive for sore throat (from a cold). Eyes:  Positive for visual disturbance (glasses). Respiratory: Negative. Cardiovascular: Negative. Gastrointestinal: Negative. Endocrine: Negative. Genitourinary: Negative. Musculoskeletal: Negative. Skin: Negative. Allergic/Immunologic: Negative. Neurological: Negative. Hematological: Negative. Psychiatric/Behavioral: Negative. PHYSICAL EXAMINATION:     Vital signs reviewed per the nursing documentation. /71   Pulse 58   Ht 5' 1.75\" (1.568 m)   Wt 162 lb (73.5 kg)   BMI 29.87 kg/m²   Body mass index is 29.87 kg/m². Physical Exam  Vitals and nursing note reviewed. Constitutional:       Appearance: She is well-developed. Comments: Mildly overweight patient. HENT:      Head: Normocephalic and atraumatic. Eyes:      General: No scleral icterus. Conjunctiva/sclera: Conjunctivae normal.      Pupils: Pupils are equal, round, and reactive to light. Neck:      Thyroid: No thyromegaly. Vascular: No hepatojugular reflux or JVD. Trachea: No tracheal deviation. Cardiovascular:      Rate and Rhythm: Normal rate and regular rhythm. Heart sounds: Normal heart sounds. Pulmonary:      Effort: Pulmonary effort is normal. No respiratory distress. Breath sounds: Normal breath sounds. No wheezing or rales. Abdominal:      General: Bowel sounds are normal. There is no distension. Palpations: Abdomen is soft. There is no hepatomegaly or mass. Tenderness: There is no abdominal tenderness. There is no rebound. Hernia: No hernia is present. Musculoskeletal:         General: No tenderness. Cervical back: Normal range of motion and neck supple. Comments: No joint swelling   Lymphadenopathy:      Cervical: No cervical adenopathy. Skin:     General: Skin is warm. Findings: No bruising, ecchymosis, erythema or rash. Neurological:      Mental Status: She is alert and oriented to person, place, and time. Cranial Nerves: No cranial nerve deficit.    Psychiatric: Thought Content: Thought content normal.         LABORATORY DATA: Reviewed  Lab Results   Component Value Date    WBC 10.5 06/04/2021    HGB 13.5 06/04/2021    HCT 41.7 06/04/2021    MCV 91.2 06/04/2021     06/04/2021     06/04/2021    K 3.8 06/04/2021     06/04/2021    CO2 25 06/04/2021    BUN 16 06/04/2021    CREATININE 0.58 06/04/2021    LABALBU 4.2 06/04/2021    BILITOT 0.44 06/04/2021    ALKPHOS 92 06/04/2021    AST 19 06/04/2021    ALT 13 06/04/2021    INR 0.9 11/22/2020         Lab Results   Component Value Date    RBC 4.57 06/04/2021    HGB 13.5 06/04/2021    MCV 91.2 06/04/2021    MCH 29.5 06/04/2021    MCHC 32.4 06/04/2021    RDW 12.4 06/04/2021    MPV 9.8 06/04/2021    BASOPCT 0 06/04/2021    LYMPHSABS 1.89 06/04/2021    MONOSABS 1.11 06/04/2021    NEUTROABS 7.19 06/04/2021    EOSABS 0.27 06/04/2021    BASOSABS 0.04 06/04/2021         DIAGNOSTIC TESTING:     No results found. Assessment  1. Gastroesophageal reflux disease, unspecified whether esophagitis present    2. Scleroderma ([address]    3. Irritable bowel syndrome, unspecified type        Plan  Patient appears stable. In the past it was felt that patient may have IBS and no symptoms appears to be in remission. She does have chronic GERD. Given that she has scleroderma diagnosis, need to evaluate esophageal pathology. To continue PPI therapy, Protonix 40 mg once a day. Also advised antireflux measures. To lose weight. Discussed with the patient regarding scleroderma and esophageal involvement. Advised to make food into small pieces, chew well and swallow. However patient does not have dysphagia at this time. Strongly encouraged calorie restriction and to lose weight. Thank you for allowing me to participate in the care of Ms. Woodard. For any further questions please do not hesitate to contact me. I have reviewed and agree with the ROS entered by the MA/LPN.      Note is dictated utilizing voice recognition software. Unfortunately this leads to occasional typographical errors.  Please contact our office if you have any questions        George MD Jorge L,FACP, [facility]  Board Certified in Gastroenterology and [address] [place] [address] Gastroenterology  Office #: [phone]

## 2023-01-10 NOTE — TELEPHONE ENCOUNTER
Writer spoke with patient regarding her PAT time not working for her due to her having a dr's appointment. Patients PAT time is now at 10:15am for 1/11/23. Patient been advised.

## 2023-01-11 ENCOUNTER — HOSPITAL ENCOUNTER (OUTPATIENT)
Dept: PREADMISSION TESTING | Age: 73
Discharge: HOME | End: 2023-01-15

## 2023-01-11 VITALS — BODY MASS INDEX: 29.26 KG/M2 | HEIGHT: 62 IN | WEIGHT: 159 LBS

## 2023-01-11 NOTE — TELEPHONE ENCOUNTER
Writer received VM from patient regarding her procedure 1/17/23 patient stated she has a cold and stated she will be in contact with our office on Monday if its better-patient did state she doesn't have COVID stated she was tested.

## 2023-01-11 NOTE — PROGRESS NOTES

## 2023-01-15 ENCOUNTER — HOSPITAL ENCOUNTER (OUTPATIENT)
Age: 73
Discharge: HOME | End: 2023-01-17
Payer: MEDICARE

## 2023-01-15 ENCOUNTER — OFFICE VISIT (OUTPATIENT)
Dept: FAMILY MEDICINE CLINIC | Age: 73
End: 2023-01-15

## 2023-01-15 ENCOUNTER — HOSPITAL ENCOUNTER (OUTPATIENT)
Dept: GENERAL RADIOLOGY | Age: 73
Discharge: HOME | End: 2023-01-17
Payer: MEDICARE

## 2023-01-15 VITALS
TEMPERATURE: 98.6 F | BODY MASS INDEX: 29.26 KG/M2 | OXYGEN SATURATION: 96 % | HEIGHT: 62 IN | SYSTOLIC BLOOD PRESSURE: 124 MMHG | DIASTOLIC BLOOD PRESSURE: 76 MMHG | HEART RATE: 55 BPM | WEIGHT: 159 LBS

## 2023-01-15 DIAGNOSIS — R06.2 WHEEZING: ICD-10-CM

## 2023-01-15 DIAGNOSIS — R05.1 ACUTE COUGH: ICD-10-CM

## 2023-01-15 DIAGNOSIS — J01.90 ACUTE BACTERIAL SINUSITIS: ICD-10-CM

## 2023-01-15 DIAGNOSIS — B96.89 ACUTE BACTERIAL SINUSITIS: ICD-10-CM

## 2023-01-15 DIAGNOSIS — R05.1 ACUTE COUGH: Primary | ICD-10-CM

## 2023-01-15 PROCEDURE — 71046 X-RAY EXAM CHEST 2 VIEWS: CPT

## 2023-01-15 RX ORDER — AZITHROMYCIN 250 MG/1
250 TABLET, FILM COATED ORAL SEE ADMIN INSTRUCTIONS
Qty: 6 TABLET | Refills: 0 | Status: SHIPPED | OUTPATIENT
Start: 2023-01-15 | End: 2023-01-20

## 2023-01-15 RX ORDER — FLUTICASONE PROPIONATE 50 MCG
2 SPRAY, SUSPENSION (ML) NASAL DAILY
Qty: 16 G | Refills: 0 | Status: SHIPPED | OUTPATIENT
Start: 2023-01-15

## 2023-01-15 RX ORDER — ALBUTEROL SULFATE 90 UG/1
2 AEROSOL, METERED RESPIRATORY (INHALATION) EVERY 4 HOURS PRN
Qty: 18 G | Refills: 3 | Status: SHIPPED | OUTPATIENT
Start: 2023-01-15

## 2023-01-15 RX ORDER — EMOLLIENT COMBINATION NO.10
EMULSION (GRAM) TOPICAL
COMMUNITY
Start: 2023-01-11

## 2023-01-15 ASSESSMENT — ENCOUNTER SYMPTOMS
WHEEZING: 1
COUGH: 1
RHINORRHEA: 0
HEMOPTYSIS: 0
HEARTBURN: 0
SHORTNESS OF BREATH: 0
SORE THROAT: 0

## 2023-01-15 NOTE — PROGRESS NOTES
[address] [address]  Dept: [phone]  Dept Fax: [phone]    Jamey Howell is a 71 y.o. female who presents to the urgent care today for her medical conditions/complaints as notedbelow. Jamey Howell is c/o of Chest Congestion (Pt was seen for bronchitis on 22 and it has just been getting worse. ), Hemoptysis (Patient has started coughing up blood today. ), and Congestion      HPI:     71 yr old female presents for cough, head and chest congestion  Seen 2023 at [facility] in for dry np cough for 1 week  Neg flu and covid testing  Rx silvio perles    Here today reports sx gradually worsening with nasal congestion and cough now prod yellow. Nasal mucous and cough had streaks bright red blood in them today. No clots, no SOB or chest pain but having some wheezing. Has inhaler but believes . Hx resp failure, pneumonia and bronchitis, scleroderma. Gets pneumonia very easily. Cough  This is a new problem. The current episode started 1 to 4 weeks ago. The problem has been gradually worsening. The problem occurs every few minutes. The cough is Productive of purulent sputum and productive of blood-tinged sputum. Associated symptoms include nasal congestion, weight loss and wheezing. Pertinent negatives include no chest pain, chills, ear congestion, ear pain, fever, headaches, heartburn, hemoptysis, myalgias, postnasal drip, rash, rhinorrhea, sore throat, shortness of breath or sweats. Nothing aggravates the symptoms. She has tried a beta-agonist inhaler ([facility] expectorant, alb inhaler) for the symptoms. The treatment provided mild relief. Her past medical history is significant for bronchitis and pneumonia. There is no history of asthma, bronchiectasis, COPD, emphysema or environmental allergies.      Past Medical History:   Diagnosis Date    Anxiety Diverticulosis of colon     DJD (degenerative joint disease) 01/07/2015    Family history of colon cancer     Generalized OA 01/07/2015    GERD (gastroesophageal reflux disease)     Hyperlipidemia 01/07/2015    Irritable bowel syndrome     Osteoarthritis     Scleroderma (HCC)         Current Outpatient Medications   Medication Sig Dispense Refill    Niacin (VITAMIN B-3 PO) Take by mouth      albuterol sulfate HFA (PROAIR HFA) 108 (90 Base) MCG/ACT inhaler Inhale 2 puffs into the lungs every 4 hours as needed for Wheezing 18 g 3    azithromycin (ZITHROMAX) 250 MG tablet Take 1 tablet by mouth See Admin Instructions for 5 days 500mg on day 1 followed by 250mg on days 2 - 5 6 tablet 0    fluticasone (FLONASE) 50 MCG/ACT nasal spray 2 sprays by Each Nostril route daily 16 g 0    hydroxychloroquine (PLAQUENIL) 200 MG tablet 1 TABLET WITH FOOD OR MILK ORALLY DAILY AT BEDTIME 90 DAYS      pantoprazole (PROTONIX) 40 MG tablet Take 1 tablet by mouth daily 90 tablet 3    hydrOXYzine HCl (ATARAX) 25 MG tablet TAKE 1 TABLET BY MOUTH EVERY DAY AS NEEDED FOR ANXIETY 90 tablet 0    rosuvastatin (CRESTOR) 5 MG tablet TAKE 1 TABLET BY MOUTH NIGHTLY 90 tablet 1    aspirin 81 MG EC tablet TAKE 1 TABLET BY MOUTH EVERY DAY 90 tablet 1    ZINC PO Take by mouth      albuterol sulfate HFA (PROAIR HFA) 108 (90 Base) MCG/ACT inhaler Inhale 2 puffs into the lungs every 6 hours as needed for Wheezing 1 Inhaler 2    diclofenac sodium (VOLTAREN) 1 % GEL Apply topically 2 times daily 100 g 0    vitamin C (ASCORBIC ACID) 500 MG tablet Take 500 mg by mouth daily      valACYclovir (VALTREX) 1 g tablet Take 1,000 mg by mouth 2 times daily Patient uses PRN      Coenzyme Q10 (COQ10 PO) Take by mouth      Multiple Vitamins-Minerals (MULTIVITAMIN PO) Take by mouth      vitamin D (ERGOCALCIFEROL) 400 UNITS CAPS Take 400 Units by mouth daily      calcium carbonate-vitamin D 600-200 MG-UNIT TABS Take  by mouth.       Wound Dressings (SONAFINE) EMUL APPLY TO HANDS TWICE A DAY      benzonatate (TESSALON) 200 MG capsule Take 1 capsule by mouth 3 times daily as needed for Cough (Patient not taking: Reported on 1/15/2023) 30 capsule 1    Cyanocobalamin (VITAMIN B-12 CR PO) Take by mouth (Patient not taking: Reported on 1/15/2023)       No current facility-administered medications for this visit. Allergies   Allergen Reactions    Nalbuphine      Other reaction(s): claustophobia; confusion    Sulfa Antibiotics Hives    Formaldehyde Rash    Nickel Rash    Morphine And Related Other (See Comments)    Oxycodone        Subjective:      Review of Systems   Constitutional:  Positive for weight loss. Negative for chills and fever. HENT:  Negative for ear pain, postnasal drip, rhinorrhea and sore throat. Respiratory:  Positive for cough and wheezing. Negative for hemoptysis and shortness of breath. Cardiovascular:  Negative for chest pain. Gastrointestinal:  Negative for heartburn. Musculoskeletal:  Negative for myalgias. Skin:  Negative for rash. Allergic/Immunologic: Negative for environmental allergies. Neurological:  Negative for headaches. All other systems reviewed and are negative. 14 systems reviewed and negative except as listed in HPI. Objective:     Physical Exam  Vitals and nursing note reviewed. Constitutional:       General: She is not in acute distress. Appearance: Normal appearance. She is well-developed. She is not ill-appearing, toxic-appearing or diaphoretic. Comments: nontoxic   HENT:      Head: Normocephalic and atraumatic. Right Ear: Tympanic membrane, ear canal and external ear normal.      Left Ear: Tympanic membrane, ear canal and external ear normal.      Nose: Congestion and rhinorrhea present. Comments: mckinley sinus tenderness  No facial swelling or erythema     Mouth/Throat:      Mouth: Mucous membranes are moist.      Pharynx: Oropharynx is clear. No oropharyngeal exudate or posterior oropharyngeal erythema. Comments: + cobblestoning  Uvula midline no edema  Handling oral secretions without difficulty  Eyes:      General: No scleral icterus. Right eye: No discharge. Left eye: No discharge. Conjunctiva/sclera: Conjunctivae normal.      Pupils: Pupils are equal, round, and reactive to light. Cardiovascular:      Rate and Rhythm: Normal rate and regular rhythm. Pulses: Normal pulses. Heart sounds: Normal heart sounds. Pulmonary:      Effort: Pulmonary effort is normal. No respiratory distress. Breath sounds: Normal breath sounds. No stridor. No wheezing, rhonchi or rales. Chest:      Chest wall: No tenderness. Abdominal:      General: Bowel sounds are normal. There is no distension. Palpations: Abdomen is soft. Tenderness: There is no abdominal tenderness. Musculoskeletal:         General: No tenderness, deformity or signs of injury. Normal range of motion. Cervical back: Normal range of motion and neck supple. Comments: Ambulated to and from room, gait steady, moving all ext without difficulty   Lymphadenopathy:      Cervical: No cervical adenopathy. Skin:     General: Skin is warm and dry. Capillary Refill: Capillary refill takes less than 2 seconds. Findings: No rash ( no rash to visible skin). Neurological:      General: No focal deficit present. Mental Status: She is alert and oriented to person, place, and time. Motor: No abnormal muscle tone. Coordination: Coordination normal.   Psychiatric:         Mood and Affect: Mood normal.         Behavior: Behavior normal.     /76 (Site: Left Upper Arm, Position: Sitting, Cuff Size: Large Adult)   Pulse 55   Temp 98.6 °F (37 °C) (Tympanic)   Ht 5' 2\" (1.575 m)   Wt 159 lb (72.1 kg)   SpO2 96%   BMI 29.08 kg/m²     Assessment:       Diagnosis Orders   1. Acute cough  XR CHEST STANDARD (2 VW)    azithromycin (ZITHROMAX) 250 MG tablet      2.  Wheezing  XR CHEST STANDARD (2 VW)    azithromycin (ZITHROMAX) 250 MG tablet      3. Acute bacterial sinusitis            Plan:    Reviewed pt chart in Epic  Cough was 1 week, dry np when seen  at [facility]  Covid and flu testing neg  Rx tessalon perles - getting worse  Sig hx bronchitis resp failure and pneumonia, scleroderma  Ls clear t/o but reports wheezing, colored mucous and ongoing cough for few weeks  No fevers, VSS  Alb inhaler refilled  Zpak rx  Flonase rx  Red flag sx reviewed  Reviewed over-the-counter treatments for symptom management. Chest xray for ongoing cough, hx scleroderma, fx pneumonia, prod cough  Return for Make an Appt. with your Primary Care in 1 week. Orders Placed This Encounter   Medications    albuterol sulfate HFA (PROAIR HFA) 108 (90 Base) MCG/ACT inhaler     Sig: Inhale 2 puffs into the lungs every 4 hours as needed for Wheezing     Dispense:  18 g     Refill:  3    azithromycin (ZITHROMAX) 250 MG tablet     Sig: Take 1 tablet by mouth See Admin Instructions for 5 days 500mg on day 1 followed by 250mg on days 2 - 5     Dispense:  6 tablet     Refill:  0    fluticasone (FLONASE) 50 MCG/ACT nasal spray     Si sprays by Each Nostril route daily     Dispense:  16 g     Refill:  0           Patient given educational materials - see patient instructions. Discussed use, benefit, and side effects of prescribed medications. All patient questions answered. Pt voicedunderstanding.     Electronically signed by DREW Donis CNP on 1/15/2023 at 2:55 PM

## 2023-01-16 ENCOUNTER — OFFICE VISIT (OUTPATIENT)
Dept: UROLOGY | Age: 73
End: 2023-01-16
Payer: MEDICARE

## 2023-01-16 VITALS
WEIGHT: 159 LBS | BODY MASS INDEX: 29.26 KG/M2 | TEMPERATURE: 97.8 F | SYSTOLIC BLOOD PRESSURE: 132 MMHG | DIASTOLIC BLOOD PRESSURE: 66 MMHG | RESPIRATION RATE: 16 BRPM | HEIGHT: 62 IN | HEART RATE: 72 BPM

## 2023-01-16 DIAGNOSIS — R31.29 MICROHEMATURIA: Primary | ICD-10-CM

## 2023-01-16 LAB
BILIRUBIN, POC: NORMAL
BLOOD URINE, POC: NORMAL
CLARITY, POC: CLEAR
COLOR, POC: YELLOW
GLUCOSE URINE, POC: NORMAL
KETONES, POC: NORMAL
LEUKOCYTE EST, POC: NORMAL
NITRITE, POC: NORMAL
PH, POC: NORMAL
PROTEIN, POC: NORMAL
SPECIFIC GRAVITY, POC: NORMAL
UROBILINOGEN, POC: NORMAL

## 2023-01-16 PROCEDURE — 81002 URINALYSIS NONAUTO W/O SCOPE: CPT | Performed by: UROLOGY

## 2023-01-16 PROCEDURE — G8399 PT W/DXA RESULTS DOCUMENT: HCPCS | Performed by: UROLOGY

## 2023-01-16 PROCEDURE — 1036F TOBACCO NON-USER: CPT | Performed by: UROLOGY

## 2023-01-16 PROCEDURE — 1123F ACP DISCUSS/DSCN MKR DOCD: CPT | Performed by: UROLOGY

## 2023-01-16 PROCEDURE — G8417 CALC BMI ABV UP PARAM F/U: HCPCS | Performed by: UROLOGY

## 2023-01-16 PROCEDURE — G8484 FLU IMMUNIZE NO ADMIN: HCPCS | Performed by: UROLOGY

## 2023-01-16 PROCEDURE — 1090F PRES/ABSN URINE INCON ASSESS: CPT | Performed by: UROLOGY

## 2023-01-16 PROCEDURE — 3017F COLORECTAL CA SCREEN DOC REV: CPT | Performed by: UROLOGY

## 2023-01-16 PROCEDURE — G8427 DOCREV CUR MEDS BY ELIG CLIN: HCPCS | Performed by: UROLOGY

## 2023-01-16 PROCEDURE — 99204 OFFICE O/P NEW MOD 45 MIN: CPT | Performed by: UROLOGY

## 2023-01-16 ASSESSMENT — ENCOUNTER SYMPTOMS
COUGH: 0
ABDOMINAL PAIN: 0
SHORTNESS OF BREATH: 0
VOMITING: 0
EYE REDNESS: 0
WHEEZING: 0
BACK PAIN: 0
EYE PAIN: 0
NAUSEA: 0
CONSTIPATION: 0
DIARRHEA: 0

## 2023-01-16 NOTE — PROGRESS NOTES
3.  [id number]  Dept: [address] [place] [address] Urology Office Note - New Patient    Patient:  Deborah Tucker  YOB: 1950  Date: 1/16/2023    The patient is a 71 y.o. female who presentstoday for evaluation of the following problems:   Chief Complaint   Patient presents with    Hematuria     New patient      referred by Kalpesh Fowler PA-C.    HPI  This is a very pleasant 71-year-old female who has microscopic hematuria. She has no other urinary symptoms. She has seen a urologist at [facility] before and did have a cystoscopy but does not think that she had any upper urinary tract imaging. This was about a year ago. She was told she had a \"pimple in my bladder. \"  She was also told that this was nothing to worry about. She does not recall having a biopsy. She is a former smoker but quit about 33 years ago. (Patient's old records have been requested, reviewed and summarized in today's note.)    Summary of old records: N/A    History: N/A    ProceduresToday: N/A    Urinalysis today:  No results found for this visit on 01/16/23. AUA Symptom Score (1/16/2023):                                Last BUN andcreatinine:  Lab Results   Component Value Date    BUN 16 06/04/2021     Lab Results   Component Value Date    CREATININE 0.58 06/04/2021       Additional Lab/Culture results: none    Reviewed during this Office Visit: none  (results were independently reviewed byphysician and radiology report verified)    PAST MEDICAL, FAMILY AND SOCIAL HISTORY:  Past Medical History:   Diagnosis Date    Anxiety     Diverticulosis of colon     DJD (degenerative joint disease) 01/07/2015    Family history of colon cancer     Generalized OA 01/07/2015    GERD (gastroesophageal reflux disease)     Hyperlipidemia 01/07/2015    Irritable bowel syndrome     Osteoarthritis     Scleroderma (HCC)      Past Surgical History:   Procedure Laterality Date    CHOLECYSTECTOMY      COLONOSCOPY  2004    normal    COLONOSCOPY  4/25/14    diverticulosis    COLONOSCOPY N/A 2/9/2018    COLONOSCOPY POLYPECTOMY COLD BIOPSY performed by Devika Sidhu MD at [address]  02/09/2018    Spasms in the sigmoid colon. No colitis or ileitis seen.     DILATION AND CURETTAGE OF UTERUS  1970    DILATION AND CURETTAGE OF UTERUS  1991    ENDOSCOPY, COLON, DIAGNOSTIC      JOINT REPLACEMENT Right 8/26/2013    hip    TONSILLECTOMY  1954    UPPER GASTROINTESTINAL ENDOSCOPY  2004    normal    UPPER GASTROINTESTINAL ENDOSCOPY  4/25/14    esophagitis with bx     Family History   Problem Relation Age of Onset    Other Mother         had many stomach surgeries    COPD Father     Cancer Father         colon    Other Brother         sepsis    Cancer Maternal Grandfather         colon     Outpatient Medications Marked as Taking for the 1/16/23 encounter (Office Visit) with Latoya Aguila MD   Medication Sig Dispense Refill    Wound Dressings (SONAFINE) EMUL APPLY TO HANDS TWICE A DAY      Niacin (VITAMIN B-3 PO) Take by mouth      albuterol sulfate HFA (PROAIR HFA) 108 (90 Base) MCG/ACT inhaler Inhale 2 puffs into the lungs every 4 hours as needed for Wheezing 18 g 3    azithromycin (ZITHROMAX) 250 MG tablet Take 1 tablet by mouth See Admin Instructions for 5 days 500mg on day 1 followed by 250mg on days 2 - 5 6 tablet 0    fluticasone (FLONASE) 50 MCG/ACT nasal spray 2 sprays by Each Nostril route daily 16 g 0    hydroxychloroquine (PLAQUENIL) 200 MG tablet 1 TABLET WITH FOOD OR MILK ORALLY DAILY AT BEDTIME 90 DAYS      pantoprazole (PROTONIX) 40 MG tablet Take 1 tablet by mouth daily 90 tablet 3    hydrOXYzine HCl (ATARAX) 25 MG tablet TAKE 1 TABLET BY MOUTH EVERY DAY AS NEEDED FOR ANXIETY 90 tablet 0    rosuvastatin (CRESTOR) 5 MG tablet TAKE 1 TABLET BY MOUTH NIGHTLY 90 tablet 1    aspirin 81 MG EC tablet TAKE 1 TABLET BY MOUTH EVERY DAY 90 tablet 1    ZINC PO Take by mouth      albuterol sulfate HFA (PROAIR HFA) 108 (90 Base) MCG/ACT inhaler Inhale 2 puffs into the lungs every 6 hours as needed for Wheezing 1 Inhaler 2    diclofenac sodium (VOLTAREN) 1 % GEL Apply topically 2 times daily 100 g 0    vitamin C (ASCORBIC ACID) 500 MG tablet Take 500 mg by mouth daily      valACYclovir (VALTREX) 1 g tablet Take 1,000 mg by mouth 2 times daily Patient uses PRN      Coenzyme Q10 (COQ10 PO) Take by mouth      Multiple Vitamins-Minerals (MULTIVITAMIN PO) Take by mouth      vitamin D (ERGOCALCIFEROL) 400 UNITS CAPS Take 400 Units by mouth daily      calcium carbonate-vitamin D 600-200 MG-UNIT TABS Take  by mouth. Nalbuphine, Sulfa antibiotics, Formaldehyde, Nickel, Morphine and related, and Oxycodone  Social History     Tobacco Use   Smoking Status Former    Packs/day: 1.50    Years: 20.00    Pack years: 30.00    Types: Cigarettes    Quit date: 1990    Years since quittin.0   Smokeless Tobacco Never      (If patient a smoker, smoking cessation counseling offered)   Social History     Substance and Sexual Activity   Alcohol Use Yes    Comment: Occasional       REVIEW OF SYSTEMS:  Review of Systems    Physical Exam:    This a 71 y.o. female      Vitals:    23 1313   BP: 132/66   Pulse: 72   Resp: 16   Temp: 97.8 °F (36.6 °C)     Body mass index is 29.08 kg/m². Physical Exam  Constitutional: Patient in no acute distress, ggod grooming, appropriately dressed  Neuro: Alert and oriented to person, place and time. Psych:Mood normal, affect normal  Skin: No rash noted  HEENT: Head: Normocephalic and atraumatic,Conjunctivae and EOM are normal,Nose- normal, Right/Left External Ear: normal, Mouth: Mucosa Moist  Neck: Supple  Lungs: Respiratory effort is normal  Cardiovascular: strong and regular, no lower leg edema  Abdomen: Soft, non-tender, non-distended with no CVA,    Lymphatics: No cervical palpable lymphadenopathy.   Bladder non-tender and not distended. Musculoskeletal: Normal gait and station        Assessment and Plan      1. Microhematuria            Plan: ct urogram and cysto for microhematuria  Pt has had cysto last year by [facility])       Prescriptions Ordered:  No orders of the defined types were placed in this encounter. Orders Placed:  Orders Placed This Encounter   Procedures    CT UROGRAM     Standing Status:   Future     Standing Expiration Date:   1/16/2024     Order Specific Question:   Reason for exam:     Answer:   microhematuria              Viviane Carrillo MD    Agree with the ROS entered by the MA.

## 2023-01-16 NOTE — PROGRESS NOTES
Review of Systems   Constitutional:  Negative for appetite change, chills and fever.   Eyes:  Negative for pain, redness and visual disturbance.   Respiratory:  Negative for cough, shortness of breath and wheezing.    Cardiovascular:  Negative for chest pain and leg swelling.   Gastrointestinal:  Negative for abdominal pain, constipation, diarrhea, nausea and vomiting.   Genitourinary:  Positive for hematuria. Negative for difficulty urinating, dysuria, flank pain, frequency and urgency.   Musculoskeletal:  Negative for back pain, joint swelling and myalgias.   Skin:  Negative for rash and wound.   Neurological:  Negative for dizziness, tremors and numbness.   Hematological:  Does not bruise/bleed easily.

## 2023-01-17 ENCOUNTER — HOSPITAL ENCOUNTER (OUTPATIENT)
Dept: CT IMAGING | Age: 73
Discharge: HOME | End: 2023-01-19
Payer: MEDICARE

## 2023-01-17 DIAGNOSIS — R31.29 MICROHEMATURIA: ICD-10-CM

## 2023-01-17 LAB
EGFR, POC: >60 ML/MIN/1.73M2
POC CREATININE: 0.58 MG/DL (ref 0.51–1.19)

## 2023-01-17 PROCEDURE — 74178 CT ABD&PLV WO CNTR FLWD CNTR: CPT | Performed by: UROLOGY

## 2023-01-17 PROCEDURE — 6360000004 HC RX CONTRAST MEDICATION: Performed by: UROLOGY

## 2023-01-17 PROCEDURE — 2580000003 HC RX 258: Performed by: UROLOGY

## 2023-01-17 PROCEDURE — 82565 ASSAY OF CREATININE: CPT

## 2023-01-17 RX ORDER — SODIUM CHLORIDE 0.9 % (FLUSH) 0.9 %
10 SYRINGE (ML) INJECTION PRN
Status: DISCONTINUED | OUTPATIENT
Start: 2023-01-17 | End: 2023-01-20 | Stop reason: HOSPADM

## 2023-01-17 RX ORDER — 0.9 % SODIUM CHLORIDE 0.9 %
100 INTRAVENOUS SOLUTION INTRAVENOUS ONCE
Status: COMPLETED | OUTPATIENT
Start: 2023-01-17 | End: 2023-01-17

## 2023-01-17 RX ADMIN — IOPAMIDOL 120 ML: 755 INJECTION, SOLUTION INTRAVENOUS at 15:54

## 2023-01-17 RX ADMIN — SODIUM CHLORIDE, PRESERVATIVE FREE 10 ML: 5 INJECTION INTRAVENOUS at 15:55

## 2023-01-17 RX ADMIN — SODIUM CHLORIDE 100 ML: 9 INJECTION, SOLUTION INTRAVENOUS at 15:55

## 2023-01-24 SDOH — HEALTH STABILITY: PHYSICAL HEALTH: ON AVERAGE, HOW MANY DAYS PER WEEK DO YOU ENGAGE IN MODERATE TO STRENUOUS EXERCISE (LIKE A BRISK WALK)?: 2 DAYS

## 2023-01-24 ASSESSMENT — SOCIAL DETERMINANTS OF HEALTH (SDOH)
WITHIN THE LAST YEAR, HAVE YOU BEEN AFRAID OF YOUR PARTNER OR EX-PARTNER?: PATIENT DECLINED
WITHIN THE LAST YEAR, HAVE YOU BEEN KICKED, HIT, SLAPPED, OR OTHERWISE PHYSICALLY HURT BY YOUR PARTNER OR EX-PARTNER?: PATIENT DECLINED
WITHIN THE LAST YEAR, HAVE YOU BEEN HUMILIATED OR EMOTIONALLY ABUSED IN OTHER WAYS BY YOUR PARTNER OR EX-PARTNER?: PATIENT DECLINED
WITHIN THE LAST YEAR, HAVE TO BEEN RAPED OR FORCED TO HAVE ANY KIND OF SEXUAL ACTIVITY BY YOUR PARTNER OR EX-PARTNER?: PATIENT DECLINED

## 2023-01-25 ENCOUNTER — OFFICE VISIT (OUTPATIENT)
Dept: PRIMARY CARE CLINIC | Age: 73
End: 2023-01-25
Payer: MEDICARE

## 2023-01-25 ENCOUNTER — HOSPITAL ENCOUNTER (OUTPATIENT)
Age: 73
Setting detail: SPECIMEN
Discharge: HOME | End: 2023-01-25

## 2023-01-25 VITALS
DIASTOLIC BLOOD PRESSURE: 62 MMHG | BODY MASS INDEX: 29.66 KG/M2 | OXYGEN SATURATION: 96 % | WEIGHT: 161.2 LBS | SYSTOLIC BLOOD PRESSURE: 124 MMHG | HEART RATE: 53 BPM | RESPIRATION RATE: 16 BRPM | HEIGHT: 62 IN

## 2023-01-25 DIAGNOSIS — M34.9 SCLERODERMA (HCC): Primary | ICD-10-CM

## 2023-01-25 DIAGNOSIS — E04.1 THYROID NODULE: ICD-10-CM

## 2023-01-25 DIAGNOSIS — M25.562 CHRONIC PAIN OF LEFT KNEE: ICD-10-CM

## 2023-01-25 DIAGNOSIS — F41.9 ANXIETY: ICD-10-CM

## 2023-01-25 DIAGNOSIS — R79.89 ELEVATED LFTS: ICD-10-CM

## 2023-01-25 DIAGNOSIS — E55.9 VITAMIN D DEFICIENCY: ICD-10-CM

## 2023-01-25 DIAGNOSIS — K21.9 GASTROESOPHAGEAL REFLUX DISEASE WITHOUT ESOPHAGITIS: ICD-10-CM

## 2023-01-25 DIAGNOSIS — G89.29 CHRONIC PAIN OF LEFT KNEE: ICD-10-CM

## 2023-01-25 DIAGNOSIS — R31.29 MICROSCOPIC HEMATURIA: ICD-10-CM

## 2023-01-25 DIAGNOSIS — I25.10 ATHEROSCLEROSIS OF NATIVE CORONARY ARTERY WITHOUT ANGINA PECTORIS, UNSPECIFIED WHETHER NATIVE OR TRANSPLANTED HEART: ICD-10-CM

## 2023-01-25 DIAGNOSIS — E78.2 MIXED HYPERLIPIDEMIA: ICD-10-CM

## 2023-01-25 PROBLEM — J96.01 ACUTE RESPIRATORY FAILURE WITH HYPOXIA (HCC): Status: RESOLVED | Noted: 2020-11-22 | Resolved: 2023-01-25

## 2023-01-25 PROBLEM — N95.2 POSTMENOPAUSAL ATROPHIC VAGINITIS: Status: ACTIVE | Noted: 2022-10-28

## 2023-01-25 PROBLEM — N28.1 CYST OF KIDNEY, ACQUIRED: Status: ACTIVE | Noted: 2022-03-15

## 2023-01-25 PROBLEM — R35.1 NOCTURIA: Status: ACTIVE | Noted: 2022-02-15

## 2023-01-25 PROBLEM — U07.1 PNEUMONIA DUE TO COVID-19 VIRUS: Status: RESOLVED | Noted: 2020-11-22 | Resolved: 2023-01-25

## 2023-01-25 PROBLEM — J12.82 PNEUMONIA DUE TO COVID-19 VIRUS: Status: RESOLVED | Noted: 2020-11-22 | Resolved: 2023-01-25

## 2023-01-25 PROBLEM — R35.0 INCREASED FREQUENCY OF URINATION: Status: ACTIVE | Noted: 2022-02-15

## 2023-01-25 LAB
ABSOLUTE EOS #: 0.28 K/UL (ref 0–0.44)
ABSOLUTE IMMATURE GRANULOCYTE: 0.04 K/UL (ref 0–0.3)
ABSOLUTE LYMPH #: 2.83 K/UL (ref 1.1–3.7)
ABSOLUTE MONO #: 0.8 K/UL (ref 0.1–1.2)
ALBUMIN SERPL-MCNC: 4.2 G/DL (ref 3.5–5.2)
ALBUMIN/GLOBULIN RATIO: 1.6 (ref 1–2.5)
ALP BLD-CCNC: 78 U/L (ref 35–104)
ALT SERPL-CCNC: 18 U/L (ref 5–33)
ANION GAP SERPL CALCULATED.3IONS-SCNC: 9 MMOL/L (ref 9–17)
AST SERPL-CCNC: 22 U/L
BASOPHILS # BLD: 0 % (ref 0–2)
BASOPHILS ABSOLUTE: 0.03 K/UL (ref 0–0.2)
BILIRUB SERPL-MCNC: 0.5 MG/DL (ref 0.3–1.2)
BUN BLDV-MCNC: 21 MG/DL (ref 8–23)
CALCIUM SERPL-MCNC: 9.3 MG/DL (ref 8.6–10.4)
CHLORIDE BLD-SCNC: 105 MMOL/L (ref 98–107)
CHOLESTEROL/HDL RATIO: 2
CHOLESTEROL: 97 MG/DL
CO2: 27 MMOL/L (ref 20–31)
CREAT SERPL-MCNC: 0.66 MG/DL (ref 0.5–0.9)
EOSINOPHILS RELATIVE PERCENT: 3 % (ref 1–4)
GFR SERPL CREATININE-BSD FRML MDRD: >60 ML/MIN/1.73M2
GLUCOSE BLD-MCNC: 95 MG/DL (ref 70–99)
HCT VFR BLD CALC: 41.3 % (ref 36.3–47.1)
HDLC SERPL-MCNC: 48 MG/DL
HEMOGLOBIN: 13.6 G/DL (ref 11.9–15.1)
IMMATURE GRANULOCYTES: 0 %
LDL CHOLESTEROL: 34 MG/DL (ref 0–130)
LYMPHOCYTES # BLD: 27 % (ref 24–43)
MAGNESIUM: 2.2 MG/DL (ref 1.6–2.6)
MCH RBC QN AUTO: 29.2 PG (ref 25.2–33.5)
MCHC RBC AUTO-ENTMCNC: 32.9 G/DL (ref 28.4–34.8)
MCV RBC AUTO: 88.6 FL (ref 82.6–102.9)
MONOCYTES # BLD: 8 % (ref 3–12)
NRBC AUTOMATED: 0 PER 100 WBC
PDW BLD-RTO: 11.9 % (ref 11.8–14.4)
PLATELET # BLD: 297 K/UL (ref 138–453)
PMV BLD AUTO: 8.9 FL (ref 8.1–13.5)
POTASSIUM SERPL-SCNC: 4.3 MMOL/L (ref 3.7–5.3)
RBC # BLD: 4.66 M/UL (ref 3.95–5.11)
SEG NEUTROPHILS: 62 % (ref 36–65)
SEGMENTED NEUTROPHILS ABSOLUTE COUNT: 6.6 K/UL (ref 1.5–8.1)
SODIUM BLD-SCNC: 141 MMOL/L (ref 135–144)
TOTAL PROTEIN: 6.9 G/DL (ref 6.4–8.3)
TRIGL SERPL-MCNC: 77 MG/DL
TSH SERPL DL<=0.05 MIU/L-ACNC: 4.3 UIU/ML (ref 0.3–5)
VITAMIN D 25-HYDROXY: 43.2 NG/ML
WBC # BLD: 10.6 K/UL (ref 3.5–11.3)

## 2023-01-25 PROCEDURE — 3017F COLORECTAL CA SCREEN DOC REV: CPT | Performed by: PHYSICIAN ASSISTANT

## 2023-01-25 PROCEDURE — 1123F ACP DISCUSS/DSCN MKR DOCD: CPT | Performed by: PHYSICIAN ASSISTANT

## 2023-01-25 PROCEDURE — 1036F TOBACCO NON-USER: CPT | Performed by: PHYSICIAN ASSISTANT

## 2023-01-25 PROCEDURE — G8484 FLU IMMUNIZE NO ADMIN: HCPCS | Performed by: PHYSICIAN ASSISTANT

## 2023-01-25 PROCEDURE — 99214 OFFICE O/P EST MOD 30 MIN: CPT | Performed by: PHYSICIAN ASSISTANT

## 2023-01-25 PROCEDURE — 1090F PRES/ABSN URINE INCON ASSESS: CPT | Performed by: PHYSICIAN ASSISTANT

## 2023-01-25 PROCEDURE — G8427 DOCREV CUR MEDS BY ELIG CLIN: HCPCS | Performed by: PHYSICIAN ASSISTANT

## 2023-01-25 PROCEDURE — G8417 CALC BMI ABV UP PARAM F/U: HCPCS | Performed by: PHYSICIAN ASSISTANT

## 2023-01-25 PROCEDURE — G8399 PT W/DXA RESULTS DOCUMENT: HCPCS | Performed by: PHYSICIAN ASSISTANT

## 2023-01-25 RX ORDER — ROSUVASTATIN CALCIUM 5 MG/1
5 TABLET, COATED ORAL NIGHTLY
Qty: 90 TABLET | Refills: 1 | Status: SHIPPED | OUTPATIENT
Start: 2023-01-25

## 2023-01-25 RX ORDER — ASPIRIN 81 MG/1
TABLET ORAL
Qty: 90 TABLET | Refills: 1 | Status: SHIPPED | OUTPATIENT
Start: 2023-01-25

## 2023-01-25 RX ORDER — HYDROXYZINE HYDROCHLORIDE 25 MG/1
TABLET, FILM COATED ORAL
Qty: 90 TABLET | Refills: 1 | Status: SHIPPED | OUTPATIENT
Start: 2023-01-25

## 2023-01-25 ASSESSMENT — PATIENT HEALTH QUESTIONNAIRE - PHQ9
SUM OF ALL RESPONSES TO PHQ9 QUESTIONS 1 & 2: 0
SUM OF ALL RESPONSES TO PHQ QUESTIONS 1-9: 0
SUM OF ALL RESPONSES TO PHQ QUESTIONS 1-9: 0
2. FEELING DOWN, DEPRESSED OR HOPELESS: 0
SUM OF ALL RESPONSES TO PHQ QUESTIONS 1-9: 0
1. LITTLE INTEREST OR PLEASURE IN DOING THINGS: 0
SUM OF ALL RESPONSES TO PHQ QUESTIONS 1-9: 0

## 2023-01-25 NOTE — PROGRESS NOTES
[facility] PRIMARY CARE  [address] [facility] [address]  [address] [address]  Dept: [phone]  Dept Fax: [phone]    Jeanette Sanchez is a 71 y.o. female who presents today for her medical conditions/complaints as noted below. Chief Complaint   Patient presents with    Establish Care     Left leg pain behind the knee x 1 month, has used Voltaren gel with good response; did have a recent sinus infection and completed antibiotics; sees urology for blood in urine, has EGD to get scheduled       HPI:     Patient presents to the office to establish care. Prior patient of Dr. Laya Adames. She has past medical history including hyperlipidemia, CAD, GERD, thyroid nodule, osteoarthritis, scleroderma, anxiety. Patient is currently following with multiple specialist.  This includes:  Endocrinology for management of thyroid nodules. She has routine ultrasounds annually. Urology for evaluation and management of microscopic hematuria. She has an upcoming cystoscopy. Rheumatology for chronic management of scleroderma and osteoarthritis. Gastroenterology for management of chronic GERD and nausea. She does have upcoming EGD. Patient reports couple weeks ago she had a sinus infection. This was treated with antibiotics. She feels that she is returned to baseline. No fevers or chills. No significant congestion, cough, shortness of breath. Patient does have concern for increasing left knee pain for the last month. No injury or trauma. Pain is intermittent and most present with activity. She is using medications as prescribed by rheumatology. BP stable. Weight stable.       Hemoglobin A1C (%)   Date Value   2021 5.4             ( goal A1C is < 7)   No results found for: LABMICR  LDL Cholesterol (mg/dL)   Date Value   2023 34   2021 67   2020 98     LDL Calculated (mg/dL)   Date Value   2015 75       (goal LDL is <100)   AST (U/L)   Date Value 2023 22     ALT (U/L)   Date Value   2023 18     BUN (mg/dL)   Date Value   2023 21     BP Readings from Last 3 Encounters:   23 124/62   23 132/66   01/15/23 124/76          (goal 120/80)    Past Medical History:   Diagnosis Date    Acute respiratory failure with hypoxia ([facility] [address] 2020    Anxiety     Diverticulosis of colon     DJD (degenerative joint disease) 2015    Family history of colon cancer     Generalized OA 2015    GERD (gastroesophageal reflux disease)     Hyperlipidemia 2015    Irritable bowel syndrome     Osteoarthritis     Pneumonia due to COVID-19 virus 2020    Scleroderma ([facility] [address]       Past Surgical History:   Procedure Laterality Date    CHOLECYSTECTOMY      COLONOSCOPY      normal    COLONOSCOPY  14    diverticulosis    COLONOSCOPY N/A 2018    [facility]ONOS[facility]YPECTOMY [facility]OLD [email or web address]  [facility]    Spasms in the sigmoid colon. No colitis or ileitis seen.     DILATION AND CURETTAGE OF UTERUS  1970    DILATION AND CURETTAGE OF UTERUS  1991    ENDOSCOPY, COLON, DIAGNOSTIC      JOINT REPLACEMENT Right 2013    hip    TONSILLECTOMY  195    UPPER GASTROINTESTINAL ENDOSCOPY      normal    UPPER GASTROINTESTINAL ENDOSCOPY  14    esophagitis with bx       Family History   Problem Relation Age of Onset    Other Mother         had many stomach surgeries    COPD Father     Cancer Father         colon    Other Brother         sepsis    Cancer Maternal Grandfather         colon       Social History     Tobacco Use    Smoking status: Former     Packs/day: 1.50     Years: 20.00     Pack years: 30.00     Types: Cigarettes     Quit date: 1990     Years since quittin.1    Smokeless tobacco: Never   Substance Use Topics    Alcohol use: Yes     Comment: Occasional      Current Outpatient Medications   Medication Sig Dispense Refill    rosuvastatin (CRESTOR) 5 MG tablet Take 1 tablet by mouth nightly 90 tablet 1    aspirin 81 MG EC tablet TAKE 1 TABLET BY MOUTH EVERY DAY 90 tablet 1    hydrOXYzine HCl (ATARAX) 25 MG tablet TAKE 1 TABLET BY MOUTH EVERY DAY AS NEEDED FOR ANXIETY 90 tablet 1    Wound Dressings (SONAFINE) EMUL APPLY TO HANDS TWICE A DAY      Niacin (VITAMIN B-3 PO) Take by mouth      albuterol sulfate HFA (PROAIR HFA) 108 (90 Base) MCG/ACT inhaler Inhale 2 puffs into the lungs every 4 hours as needed for Wheezing 18 g 3    fluticasone (FLONASE) 50 MCG/ACT nasal spray 2 sprays by Each Nostril route daily 16 g 0    hydroxychloroquine (PLAQUENIL) 200 MG tablet 1 TABLET WITH FOOD OR MILK ORALLY DAILY AT BEDTIME 90 DAYS      pantoprazole (PROTONIX) 40 MG tablet Take 1 tablet by mouth daily 90 tablet 3    ZINC PO Take by mouth      albuterol sulfate HFA (PROAIR HFA) 108 (90 Base) MCG/ACT inhaler Inhale 2 puffs into the lungs every 6 hours as needed for Wheezing 1 Inhaler 2    diclofenac sodium (VOLTAREN) 1 % GEL Apply topically 2 times daily 100 g 0    vitamin C (ASCORBIC ACID) 500 MG tablet Take 500 mg by mouth daily      valACYclovir (VALTREX) 1 g tablet Take 1,000 mg by mouth 2 times daily Patient uses PRN      Coenzyme Q10 (COQ10 PO) Take by mouth      Multiple Vitamins-Minerals (MULTIVITAMIN PO) Take by mouth      vitamin D (ERGOCALCIFEROL) 400 UNITS CAPS Take 400 Units by mouth daily      calcium carbonate-vitamin D 600-200 MG-UNIT TABS Take  by mouth. No current facility-administered medications for this visit.      Allergies   Allergen Reactions    Nalbuphine      Other reaction(s): claustophobia; confusion    Sulfa Antibiotics Hives    Formaldehyde Rash    Nickel Rash    Morphine And Related Other (See Comments)    Oxycodone        Health Maintenance   Topic Date Due    DTaP/Tdap/Td vaccine (1 - Tdap) Never done    Annual Wellness Visit (AWV)  10/19/2023    Lipids  01/25/2024    Depression Screen  01/25/2024    Breast cancer screen  10/27/2024    Colorectal Cancer Screen  02/09/2028    DEXA (modify frequency per FRAX score)  Completed    Flu vaccine  Completed    Shingles vaccine  Completed    Pneumococcal 65+ years Vaccine  Completed    COVID-19 Vaccine  Completed    Hepatitis C screen  Completed    Hepatitis A vaccine  Aged Out    Hib vaccine  Aged Out    Meningococcal (ACWY) vaccine  Aged Out       Subjective:      Review of Systems   Constitutional:  Negative for chills, fatigue and fever. HENT:  Negative for congestion, rhinorrhea and sinus pain. Respiratory:  Negative for cough and shortness of breath. Cardiovascular:  Negative for chest pain and leg swelling. Gastrointestinal:  Negative for abdominal pain, constipation, diarrhea, nausea and vomiting. Genitourinary:  Negative for difficulty urinating, frequency and urgency. Musculoskeletal:  Positive for arthralgias. Negative for back pain and myalgias. Neurological:  Negative for dizziness and headaches. Psychiatric/Behavioral:  Negative for confusion, dysphoric mood and sleep disturbance. The patient is not nervous/anxious. All other systems reviewed and are negative. Objective:     Physical Exam  Vitals and nursing note reviewed. Constitutional:       General: She is not in acute distress. Appearance: Normal appearance. HENT:      Head: Normocephalic. Mouth/Throat:      Mouth: Mucous membranes are moist.   Eyes:      Extraocular Movements: Extraocular movements intact. Conjunctiva/sclera: Conjunctivae normal.      Pupils: Pupils are equal, round, and reactive to light. Cardiovascular:      Rate and Rhythm: Normal rate and regular rhythm. Pulses: Normal pulses. Heart sounds: Normal heart sounds. Pulmonary:      Effort: Pulmonary effort is normal. No respiratory distress. Breath sounds: Normal breath sounds. Abdominal:      General: Abdomen is flat. Musculoskeletal:      Cervical back: Normal range of motion. Right lower leg: No edema. Left lower leg: No edema. Comments: Left knee: No erythema, edema, ecchymosis. Full extension. Slight limitations with flexion. Mild tenderness and swelling of the posterior knee. Mild medial joint tenderness. No lateral joint tenderness. No obvious instability. Neurovascularly intact. Right knee: Within normal limits. Lymphadenopathy:      Cervical: No cervical adenopathy. Skin:     General: Skin is warm. Capillary Refill: Capillary refill takes less than 2 seconds. Neurological:      General: No focal deficit present. Mental Status: She is alert and oriented to person, place, and time. Psychiatric:         Mood and Affect: Mood normal.         Behavior: Behavior normal.     /62 (Site: Left Upper Arm, Position: Sitting, Cuff Size: Medium Adult)   Pulse 53   Resp 16   Ht 5' 2\" (1.575 m)   Wt 161 lb 3.2 oz (73.1 kg)   SpO2 96%   BMI 29.48 kg/m²     Assessment:       ICD-10-CM    1. Scleroderma (HCC)  M34.9       2. Chronic pain of left knee  M25.562     G89.29       3. Thyroid nodule  E04.1       4. Microscopic hematuria  R31.29       5. Atherosclerosis of native coronary artery without angina pectoris, unspecified whether native or transplanted heart  I25.10 rosuvastatin (CRESTOR) 5 MG tablet     aspirin 81 MG EC tablet      6. Anxiety  F41.9 hydrOXYzine HCl (ATARAX) 25 MG tablet               Plan:      1. Patient to continue following with rheumatology for management of scleroderma. No changes. 2.  We discussed likely arthritic findings of left knee which can contribute to possible Baker's cyst.  She has an appointment upcoming with rheumatology for further evaluation. For now, I recommend ice, elevation, Tylenol/ibuprofen as needed. 3.  Continue following with endocrinology with history of thyroid nodule. 4.  Continue management with urology for microscopic hematuria. 5.  Patient to continue aspirin 81 mg daily and rosuvastatin 5 mg daily with history of CAD.   6. Patient given refill of hydroxyzine to use as needed for anxiety. Return in about 9 months (around 10/25/2023) for AWV. No orders of the defined types were placed in this encounter. Patient given educational materials - see patient instructions. Discussed use, benefit, and side effects of prescribed medications. All patient questions answered. Pt voiced understanding. Reviewed health maintenance. Instructed to continue current medications, diet and exercise. Patient agreed with treatment plan. Follow up as directed.         Electronically signed by Tyson Gil PA-C on 2/1/2023 at 7:02 AM

## 2023-02-01 ASSESSMENT — ENCOUNTER SYMPTOMS
CONSTIPATION: 0
SINUS PAIN: 0
BACK PAIN: 0
SHORTNESS OF BREATH: 0
NAUSEA: 0
ABDOMINAL PAIN: 0
DIARRHEA: 0
RHINORRHEA: 0
VOMITING: 0
COUGH: 0

## 2023-02-06 ENCOUNTER — PROCEDURE VISIT (OUTPATIENT)
Dept: UROLOGY | Age: 73
End: 2023-02-06
Payer: MEDICARE

## 2023-02-06 VITALS — WEIGHT: 161 LBS | HEIGHT: 62 IN | BODY MASS INDEX: 29.63 KG/M2

## 2023-02-06 DIAGNOSIS — R31.29 MICROHEMATURIA: Primary | ICD-10-CM

## 2023-02-06 PROCEDURE — 52000 CYSTOURETHROSCOPY: CPT | Performed by: UROLOGY

## 2023-02-06 NOTE — PROGRESS NOTES
Cystoscopy Operative Note (2/6/23): Surgeon: Noah Snider MD  Anesthesia: Urethral 2% Xylocaine  Indications: Microhematuria  Position: Dorsal Lithotomy    Findings:   Risks and Benefits discussed with patient prior to procedure. The patient was prepped and draped in the usual sterile fashion. The flexible cystoscope was advanced through the urethra and into the bladder. 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 No bladder diverticulum. There was none trabeculation noted. Ureters: Clear efflux from both ureters. Orifices with normal configuration and location. The cystoscope was removed. The patient tolerated the procedure well. Agree with the ROS entered by the MA. Plan: Follow-up as needed. Patient had a hematuria work-up last year at [facility] which did not show any concerning findings and likewise this work-up does not show any concerning findings.

## 2023-02-06 NOTE — PRE-PROCEDURE INSTRUCTIONS
No answer, left message ? yes                    Unable to leave message ? When were you told to arrive at hospital ?  0700    Do you have a  ? Are you on any blood thinners ? If yes when did you stop taking ? Do you have your prep Rx filled and instruction ? Nothing to eat the day before , only clear liquids. Are you experiencing any covid symptoms ? Do you have any infections or rash we should be aware of ? Do you have the Hibiclens soap to use the night before and the morning of surgery ? Nothing to eat or drink after midnight, only a sip of water to take any medication instructed to take the night before. Wear comfortable clothing, leave any valuables at home, remove any jewelry and body piercing . [id number] [facility]

## 2023-02-07 ENCOUNTER — ANESTHESIA EVENT (OUTPATIENT)
Dept: ENDOSCOPY | Age: 73
End: 2023-02-07
Payer: MEDICARE

## 2023-02-07 RX ORDER — FLUTICASONE PROPIONATE 50 MCG
SPRAY, SUSPENSION (ML) NASAL
OUTPATIENT
Start: 2023-02-07

## 2023-02-08 ENCOUNTER — ANESTHESIA (OUTPATIENT)
Dept: ENDOSCOPY | Age: 73
End: 2023-02-08
Payer: MEDICARE

## 2023-02-08 ENCOUNTER — HOSPITAL ENCOUNTER (OUTPATIENT)
Age: 73
Setting detail: OUTPATIENT SURGERY
Discharge: HOME | End: 2023-02-08
Attending: INTERNAL MEDICINE | Admitting: INTERNAL MEDICINE
Payer: MEDICARE

## 2023-02-08 VITALS
SYSTOLIC BLOOD PRESSURE: 101 MMHG | DIASTOLIC BLOOD PRESSURE: 71 MMHG | WEIGHT: 161 LBS | OXYGEN SATURATION: 95 % | BODY MASS INDEX: 29.63 KG/M2 | TEMPERATURE: 97.3 F | RESPIRATION RATE: 20 BRPM | HEART RATE: 52 BPM | HEIGHT: 62 IN

## 2023-02-08 DIAGNOSIS — K21.9 GASTROESOPHAGEAL REFLUX DISEASE, UNSPECIFIED WHETHER ESOPHAGITIS PRESENT: ICD-10-CM

## 2023-02-08 PROCEDURE — 7100000011 HC PHASE II RECOVERY - ADDTL 15 MIN: Performed by: INTERNAL MEDICINE

## 2023-02-08 PROCEDURE — 3700000000 HC ANESTHESIA ATTENDED CARE: Performed by: INTERNAL MEDICINE

## 2023-02-08 PROCEDURE — 3609012400 HC EGD TRANSORAL BIOPSY SINGLE/MULTIPLE: Performed by: INTERNAL MEDICINE

## 2023-02-08 PROCEDURE — 2500000003 HC RX 250 WO HCPCS: Performed by: NURSE ANESTHETIST, CERTIFIED REGISTERED

## 2023-02-08 PROCEDURE — 88305 TISSUE EXAM BY PATHOLOGIST: CPT

## 2023-02-08 PROCEDURE — 2709999900 HC NON-CHARGEABLE SUPPLY: Performed by: INTERNAL MEDICINE

## 2023-02-08 PROCEDURE — 3700000001 HC ADD 15 MINUTES (ANESTHESIA): Performed by: INTERNAL MEDICINE

## 2023-02-08 PROCEDURE — 6360000002 HC RX W HCPCS: Performed by: NURSE ANESTHETIST, CERTIFIED REGISTERED

## 2023-02-08 PROCEDURE — 7100000010 HC PHASE II RECOVERY - FIRST 15 MIN: Performed by: INTERNAL MEDICINE

## 2023-02-08 PROCEDURE — 2580000003 HC RX 258: Performed by: ANESTHESIOLOGY

## 2023-02-08 RX ORDER — SODIUM CHLORIDE 0.9 % (FLUSH) 0.9 %
5-40 SYRINGE (ML) INJECTION PRN
Status: DISCONTINUED | OUTPATIENT
Start: 2023-02-08 | End: 2023-02-08 | Stop reason: HOSPADM

## 2023-02-08 RX ORDER — MEPERIDINE HYDROCHLORIDE 25 MG/ML
12.5 INJECTION INTRAMUSCULAR; INTRAVENOUS; SUBCUTANEOUS EVERY 5 MIN PRN
Status: DISCONTINUED | OUTPATIENT
Start: 2023-02-08 | End: 2023-02-08 | Stop reason: HOSPADM

## 2023-02-08 RX ORDER — PROPOFOL 10 MG/ML
INJECTION, EMULSION INTRAVENOUS PRN
Status: DISCONTINUED | OUTPATIENT
Start: 2023-02-08 | End: 2023-02-08 | Stop reason: SDUPTHER

## 2023-02-08 RX ORDER — ACETAMINOPHEN 325 MG/1
650 TABLET ORAL
Status: DISCONTINUED | OUTPATIENT
Start: 2023-02-08 | End: 2023-02-08 | Stop reason: HOSPADM

## 2023-02-08 RX ORDER — SODIUM CHLORIDE 0.9 % (FLUSH) 0.9 %
5-40 SYRINGE (ML) INJECTION EVERY 12 HOURS SCHEDULED
Status: DISCONTINUED | OUTPATIENT
Start: 2023-02-08 | End: 2023-02-08 | Stop reason: HOSPADM

## 2023-02-08 RX ORDER — LIDOCAINE HYDROCHLORIDE 20 MG/ML
INJECTION, SOLUTION INFILTRATION; PERINEURAL PRN
Status: DISCONTINUED | OUTPATIENT
Start: 2023-02-08 | End: 2023-02-08 | Stop reason: SDUPTHER

## 2023-02-08 RX ORDER — SODIUM CHLORIDE 9 MG/ML
INJECTION, SOLUTION INTRAVENOUS PRN
Status: DISCONTINUED | OUTPATIENT
Start: 2023-02-08 | End: 2023-02-08 | Stop reason: HOSPADM

## 2023-02-08 RX ORDER — HYDRALAZINE HYDROCHLORIDE 20 MG/ML
10 INJECTION INTRAMUSCULAR; INTRAVENOUS
Status: DISCONTINUED | OUTPATIENT
Start: 2023-02-08 | End: 2023-02-08 | Stop reason: HOSPADM

## 2023-02-08 RX ORDER — LIDOCAINE HYDROCHLORIDE 10 MG/ML
1 INJECTION, SOLUTION EPIDURAL; INFILTRATION; INTRACAUDAL; PERINEURAL
Status: DISCONTINUED | OUTPATIENT
Start: 2023-02-08 | End: 2023-02-08 | Stop reason: HOSPADM

## 2023-02-08 RX ORDER — FENTANYL CITRATE 0.05 MG/ML
25 INJECTION, SOLUTION INTRAMUSCULAR; INTRAVENOUS EVERY 5 MIN PRN
Status: DISCONTINUED | OUTPATIENT
Start: 2023-02-08 | End: 2023-02-08 | Stop reason: HOSPADM

## 2023-02-08 RX ORDER — LABETALOL HYDROCHLORIDE 5 MG/ML
10 INJECTION, SOLUTION INTRAVENOUS
Status: DISCONTINUED | OUTPATIENT
Start: 2023-02-08 | End: 2023-02-08 | Stop reason: HOSPADM

## 2023-02-08 RX ORDER — SODIUM CHLORIDE, SODIUM LACTATE, POTASSIUM CHLORIDE, CALCIUM CHLORIDE 600; 310; 30; 20 MG/100ML; MG/100ML; MG/100ML; MG/100ML
INJECTION, SOLUTION INTRAVENOUS CONTINUOUS
Status: DISCONTINUED | OUTPATIENT
Start: 2023-02-08 | End: 2023-02-08 | Stop reason: HOSPADM

## 2023-02-08 RX ORDER — DIPHENHYDRAMINE HYDROCHLORIDE 50 MG/ML
12.5 INJECTION INTRAMUSCULAR; INTRAVENOUS
Status: DISCONTINUED | OUTPATIENT
Start: 2023-02-08 | End: 2023-02-08 | Stop reason: HOSPADM

## 2023-02-08 RX ORDER — ONDANSETRON 2 MG/ML
4 INJECTION INTRAMUSCULAR; INTRAVENOUS
Status: DISCONTINUED | OUTPATIENT
Start: 2023-02-08 | End: 2023-02-08 | Stop reason: HOSPADM

## 2023-02-08 RX ORDER — METOCLOPRAMIDE HYDROCHLORIDE 5 MG/ML
10 INJECTION INTRAMUSCULAR; INTRAVENOUS
Status: DISCONTINUED | OUTPATIENT
Start: 2023-02-08 | End: 2023-02-08 | Stop reason: HOSPADM

## 2023-02-08 RX ADMIN — SODIUM CHLORIDE, POTASSIUM CHLORIDE, SODIUM LACTATE AND CALCIUM CHLORIDE: 600; 310; 30; 20 INJECTION, SOLUTION INTRAVENOUS at 08:15

## 2023-02-08 RX ADMIN — PROPOFOL 50 MG: 10 INJECTION, EMULSION INTRAVENOUS at 08:54

## 2023-02-08 RX ADMIN — PROPOFOL 50 MG: 10 INJECTION, EMULSION INTRAVENOUS at 09:03

## 2023-02-08 RX ADMIN — LIDOCAINE HYDROCHLORIDE 50 MG: 20 INJECTION, SOLUTION INFILTRATION; PERINEURAL at 08:54

## 2023-02-08 RX ADMIN — PROPOFOL 50 MG: 10 INJECTION, EMULSION INTRAVENOUS at 09:14

## 2023-02-08 RX ADMIN — PROPOFOL 50 MG: 10 INJECTION, EMULSION INTRAVENOUS at 09:10

## 2023-02-08 RX ADMIN — PROPOFOL 50 MG: 10 INJECTION, EMULSION INTRAVENOUS at 08:55

## 2023-02-08 ASSESSMENT — ENCOUNTER SYMPTOMS
BACK PAIN: 1
SHORTNESS OF BREATH: 0
WHEEZING: 0
COUGH: 1
SORE THROAT: 0
SHORTNESS OF BREATH: 0
STRIDOR: 0

## 2023-02-08 ASSESSMENT — PAIN - FUNCTIONAL ASSESSMENT: PAIN_FUNCTIONAL_ASSESSMENT: 0-10

## 2023-02-08 ASSESSMENT — LIFESTYLE VARIABLES: SMOKING_STATUS: 0

## 2023-02-08 NOTE — OP NOTE
ESOPHAGOGASTRODUODENOSCOPY   ( EGD )  DATE OF PROCEDURE: 2/8/2023     SURGEON: Toy Geronimo MD    ASSISTANT: None    PREOPERATIVE DIAGNOSIS: Patient has chronic heartburn and has been taking PPI therapy for more than 10 to 15 years. Recently diagnosed to have scleroderma. Procedure performed to evaluate esophageal pathology    POSTOPERATIVE DIAGNOSIS: No endoscopy evidence of esophagitis seen. OPERATION: Upper GI endoscopy with Biopsy    ANESTHESIA: MAC    ESTIMATED BLOOD LOSS: None    COMPLICATIONS: None. SPECIMENS:  Was Obtained: Random biopsies from the body of the esophagus to evaluate microscopic esophagitis    HISTORY: The patient is a 71y.o. year old female with history of above preop diagnosis. I recommended esophagogastroduodenoscopy with possible biopsy and I explained the risk, benefits, expected outcome, and alternatives to the procedure. Risks included but are not limited to bleeding, infection, respiratory distress, hypotension, and perforation of the esophagus, stomach, or duodenum. Patient understands and is in agreement. PROCEDURE: The patient was given IV conscious sedation. The patient's SPO2 remained above 90% throughout the procedure. Cetacaine spray given. Patient placed in left lateral position. Olympus  videogastroscope was inserted orally under vision into the esophagus without difficulty and advanced into the stomach then through the pylorus up to the second part of duodenum. Findings:    Retropharyngeal area was grossly normal appearing    Esophagus: normal.  Squamocolumnar junction is at about 35 cm. No evidence of hiatal hernia. Lower esophageal sphincter opens normally. No endoscopic evidence of esophagitis seen. No Posada's mucosa seen.   Multiple random biopsies taken from the body of the esophagus to check for microscopic esophagitis    Stomach:    Fundus and Cardia Examined in Retroflexed View: normal    Body: normal    Antrum: normal    Duodenum:     Descending: normal    Bulb: normal    While withdrawing the scope the above findings were verified and the scope was removed. The patient has tolerated the procedure without unusual events.          Recommendations/Plan:   F/U Biopsies  F/U In Office as instructed  Discussed with the family                   Electronically signed by Kareen Rivera MD  on 2/8/2023 at 9:20 AM

## 2023-02-08 NOTE — DISCHARGE INSTRUCTIONS
To see in the office as scheduled. EGD DISCHARGE INSTRUCTIONS    Activity:  Rest today. No driving, operating machinery, or making any important decisions today. May resume normal activity tomorrow. Diet:  Following EGD eat slowly, chew food well, cut food into small pieces-Avoid greasy, spicy, \"crunchy\" foods X 48 hours. Call your Doctor if you have any of the following:  -Passing blood rectally or vomiting blood (it may be red or black). -Persistent nausea/vomiting  -Severe abdominal or chest pain not relieved with passing gas  -Fever of 100 degrees or more  -Redness or swelling at the IV site  -Severe sore throat or neck pain .

## 2023-02-08 NOTE — ANESTHESIA POSTPROCEDURE EVALUATION
POST- ANESTHESIA EVALUATION       Pt Name: Faustina Schirmer  MRN: [id number]  YOB: 1950  Date of evaluation: 2/8/2023  Time:  12:56 PM      /71   Pulse 52   Temp 97.3 °F (36.3 °C) (Infrared)   Resp 20   Ht 5' 2\" (1.575 m)   Wt 161 lb (73 kg)   SpO2 95%   BMI 29.45 kg/m²      Consciousness Level  Awake  Cardiopulmonary Status  Stable  Pain Adequately Treated YES  Nausea / Vomiting  NO  Adequate Hydration  YES  Anesthesia Related Complications NONE      Electronically signed by Blank Pitts MD on 2/8/2023 at 12:56 PM       Department of Anesthesiology  Postprocedure Note    Patient: Faustina Schirmer  MRN: [id number]  YOB: 1950  Date of evaluation: 2/8/2023      Procedure Summary     Date: 02/08/23 Room / Location: [address]    Anesthesia Start: 0850 Anesthesia Stop: 0926    Procedure: EGD BIOPSY ESOPHAGUS (Esophagus) Diagnosis:       Gastroesophageal reflux disease, unspecified whether esophagitis present      (Gastroesophageal reflux disease, unspecified whether esophagitis present [K21.9])    Surgeons: Juan Perkins MD Responsible Provider: Blank Pitts MD    Anesthesia Type: general ASA Status: 2          Anesthesia Type: No value filed.     Joaquin Phase I:      Joaquin Phase II: Joaquin Score: 4      Anesthesia Post Evaluation

## 2023-02-08 NOTE — H&P
HISTORY and Tre[address] [facility]IA[facility] [address] [address]       NAME:  Nadya Valencia  MRN: [id number]   YOB: 1950   Date: 2/8/2023   Age: 71 y.o. Gender: female       COMPLAINT AND PRESENT HISTORY:     Nadya Valencia is 71 y.o. female, here for EGD biopsy. Pt has had previous EGD last in 2014. History of GERD. Pt reports she ran out of her Protonix several months ago and began having reflux and nausea without associated vomiting. She takes Protonix and a natural OTC heartburn medication alternating every other day. Denies abdominal pain, diarrhea, constipations, hematochezia and melena. Denies changes in appetite and unintended weight loss. Has family history of colon cancer in her father and maternal grandfather. NPO p MN. No medications taken this am. Stopped aspirin, vitamins and supplements one week ago. Denies taking anticoagulants or blood thinning medications. Denies recent or current chest pain/pressure, palpitations, SOB, recent URI, fever or chills.      RECENT LABS, IMAGING AND TESTING     Lab Results   Component Value Date    WBC 10.6 01/25/2023    RBC 4.66 01/25/2023    HGB 13.6 01/25/2023    HCT 41.3 01/25/2023    MCV 88.6 01/25/2023    MCH 29.2 01/25/2023    MCHC 32.9 01/25/2023    RDW 11.9 01/25/2023     01/25/2023    MPV 8.9 01/25/2023        Lab Results   Component Value Date     01/25/2023    K 4.3 01/25/2023     01/25/2023    CO2 27 01/25/2023    BUN 21 01/25/2023    CREATININE 0.66 01/25/2023    GLUCOSE 95 01/25/2023    CALCIUM 9.3 01/25/2023    PROT 6.9 01/25/2023    LABALBU 4.2 01/25/2023    BILITOT 0.5 01/25/2023    ALKPHOS 78 01/25/2023    AST 22 01/25/2023    ALT 18 01/25/2023         PAST MEDICAL HISTORY     Past Medical History:   Diagnosis Date    Acute respiratory failure with hypoxia ([address] 11/22/2020    Anxiety     Diverticulosis of colon     DJD (degenerative joint disease) 01/07/2015    Family history of colon cancer     Generalized OA 01/07/2015 GERD (gastroesophageal reflux disease)     Hyperlipidemia 2015    Irritable bowel syndrome     Osteoarthritis     Pneumonia due to COVID-19 virus 2020    Scleroderma ([address]        SURGICAL HISTORY       Past Surgical History:   Procedure Laterality Date    CHOLECYSTECTOMY      COLONOSCOPY      normal    COLONOSCOPY  14    diverticulosis    COLONOSCOPY N/A 2018    COLONOSCOPY POLYPECTOMY COLD BIOPSY [facility]  [facility]    Spasms in the sigmoid colon. No colitis or ileitis seen.     DILATION AND CURETTAGE OF UTERUS  1970    DILATION AND CURETTAGE OF UTERUS  1991    ENDOSCOPY, COLON, DIAGNOSTIC      JOINT REPLACEMENT Right 2013    hip    TONSILLECTOMY      UPPER GASTROINTESTINAL ENDOSCOPY      normal    UPPER GASTROINTESTINAL ENDOSCOPY  14    esophagitis with bx       FAMILY HISTORY       Family History   Problem Relation Age of Onset    Other Mother         had many stomach surgeries    COPD Father     Cancer Father         colon    Other Brother         sepsis    Cancer Maternal Grandfather         colon       SOCIAL HISTORY       Social History     Socioeconomic History    Marital status:    Tobacco Use    Smoking status: Former     Packs/day: 1.50     Years: 20.00     Pack years: 30.00     Types: Cigarettes     Quit date: 1990     Years since quittin.1    Smokeless tobacco: Never   Vaping Use    Vaping Use: Never used   Substance and Sexual Activity    Alcohol use: Yes     Comment: Occasional    Drug use: No    Sexual activity: Not Currently     Social Determinants of Health     Financial Resource Strain: Low Risk     Difficulty of Paying Living Expenses: Not hard at all   Food Insecurity: No Food Insecurity    Worried About Running Out of Food in the Last Year: Never true    Ran Out of Food in the Last Year: Never true   Physical Activity: Sufficiently Active    Days of Exercise per Week: 2 days Minutes of Exercise per Session: 150+ min   Intimate Partner Violence: Unknown    Fear of Current or Ex-Partner: Patient refused    Emotionally Abused: Patient refused    Physically Abused: Patient refused    Sexually Abused: Patient refused        REVIEW OF SYSTEMS      Allergies   Allergen Reactions    Nalbuphine      Other reaction(s): claustophobia; confusion    Sulfa Antibiotics Hives    Formaldehyde Rash    Nickel Rash    Morphine And Related Other (See Comments)    Oxycodone        No current facility-administered medications on file prior to encounter.      Current Outpatient Medications on File Prior to Encounter   Medication Sig Dispense Refill    rosuvastatin (CRESTOR) 5 MG tablet Take 1 tablet by mouth nightly 90 tablet 1    aspirin 81 MG EC tablet TAKE 1 TABLET BY MOUTH EVERY DAY 90 tablet 1    hydrOXYzine HCl (ATARAX) 25 MG tablet TAKE 1 TABLET BY MOUTH EVERY DAY AS NEEDED FOR ANXIETY 90 tablet 1    Wound Dressings (SONAFINE) EMUL APPLY TO HANDS TWICE A DAY      Niacin (VITAMIN B-3 PO) Take by mouth      albuterol sulfate HFA (PROAIR HFA) 108 (90 Base) MCG/ACT inhaler Inhale 2 puffs into the lungs every 4 hours as needed for Wheezing 18 g 3    fluticasone (FLONASE) 50 MCG/ACT nasal spray 2 sprays by Each Nostril route daily 16 g 0    hydroxychloroquine (PLAQUENIL) 200 MG tablet 1 TABLET WITH FOOD OR MILK ORALLY DAILY AT BEDTIME 90 DAYS      pantoprazole (PROTONIX) 40 MG tablet Take 1 tablet by mouth daily 90 tablet 3    ZINC PO Take by mouth      albuterol sulfate HFA (PROAIR HFA) 108 (90 Base) MCG/ACT inhaler Inhale 2 puffs into the lungs every 6 hours as needed for Wheezing 1 Inhaler 2    diclofenac sodium (VOLTAREN) 1 % GEL Apply topically 2 times daily 100 g 0    vitamin C (ASCORBIC ACID) 500 MG tablet Take 500 mg by mouth daily      valACYclovir (VALTREX) 1 g tablet Take 1,000 mg by mouth 2 times daily Patient uses PRN      Coenzyme Q10 (COQ10 PO) Take by mouth      Multiple Vitamins-Minerals (MULTIVITAMIN PO) Take by mouth      vitamin D (ERGOCALCIFEROL) 400 UNITS CAPS Take 400 Units by mouth daily      calcium carbonate-vitamin D 600-200 MG-UNIT TABS Take  by mouth. Notation: Above medications are not currently reconciled at time of signing this H&P note, to be reconciled in pre-op per RN. Review of Systems   Constitutional:  Negative for appetite change, chills, fever and unexpected weight change. HENT:  Negative for congestion, dental problem, hearing loss and sore throat. Eyes:  Positive for visual disturbance (Glasses). Respiratory:  Positive for cough. Negative for shortness of breath and wheezing. Cardiovascular:  Negative for chest pain and palpitations. Gastrointestinal:         See HPI   Genitourinary:         Has seen urology related to micro hematuria. Pt s/p cystoscopy recently which she reports was normal.    Musculoskeletal:  Positive for arthralgias (left knee), back pain and gait problem. Skin:  Negative for rash and wound. Neurological:  Positive for headaches. Negative for dizziness, speech difficulty and light-headedness. Hematological:  Does not bruise/bleed easily. Psychiatric/Behavioral: Negative. GENERAL PHYSICAL EXAM     Vitals: Review vitals per RN flowsheet. Physical Exam  Constitutional:       General: She is not in acute distress. Appearance: She is well-developed. She is not ill-appearing. HENT:      Head: Normocephalic and atraumatic. Nose: Nose normal.      Mouth/Throat:      Mouth: Mucous membranes are moist.      Pharynx: Oropharynx is clear. No oropharyngeal exudate or posterior oropharyngeal erythema. Eyes:      General: No scleral icterus. Right eye: No discharge. Left eye: No discharge. Pupils: Pupils are equal, round, and reactive to light. Neck:      Trachea: No tracheal deviation. Cardiovascular:      Rate and Rhythm: Normal rate and regular rhythm. Heart sounds: Normal heart sounds. No murmur heard. No friction rub. No gallop. Pulmonary:      Effort: Pulmonary effort is normal. No respiratory distress. Breath sounds: Normal breath sounds. No wheezing, rhonchi or rales. Abdominal:      General: Bowel sounds are normal. There is no distension. Palpations: Abdomen is soft. Tenderness: There is no abdominal tenderness. There is no guarding. Musculoskeletal:         General: Tenderness (left knee) present. Cervical back: Neck supple. Right lower leg: No edema. Left lower leg: No edema. Skin:     General: Skin is warm and dry. Coloration: Skin is not jaundiced. Findings: No bruising, erythema or rash. Neurological:      General: No focal deficit present. Mental Status: She is alert and oriented to person, place, and time. Cranial Nerves: No cranial nerve deficit.       Gait: Gait normal.   Psychiatric:         Mood and Affect: Mood normal.      PROVISIONAL DIAGNOSES / SURGERY:      EGD BIOPSY    Gastroesophageal reflux disease, unspecified whether esophagitis present [K21.9]    Patient Active Problem List    Diagnosis Date Noted    Postmenopausal atrophic vaginitis 10/28/2022    Cyst of kidney, acquired 03/15/2022    Increased frequency of urination 02/15/2022    Microscopic hematuria 02/15/2022    Nocturia 02/15/2022    Atherosclerosis of native coronary artery without angina pectoris 04/18/2022    Anxiety 04/18/2022    Hematuria 04/18/2022    Thyroid nodule 08/09/2021    Skin sensation disturbance 08/09/2021    Vitamin D deficiency 08/09/2021    Scleroderma ([facility] 04/07/2021    Gastroesophageal reflux disease without esophagitis 11/22/2020    Elevated LFTs 11/22/2020    Family history of colon cancer     Nausea 02/08/2017    Multinodular goiter 02/23/2016    Non-intractable cyclical vomiting with nausea 01/04/2016    Irritable bowel syndrome without diarrhea 11/18/2015    Hyperlipidemia 01/07/2015    WIN (degenerative joint disease) 01/07/2015    Generalized OA 01/07/2015    Pneumonia 12/17/2014    Osteopenia 12/17/2014    Diverticulosis of colon 05/20/2014    Abdominal pain, RUQ 08/02/2012           DREW Quiroga CNP on 2/8/2023 at 7:20 AM

## 2023-02-08 NOTE — ANESTHESIA PRE PROCEDURE
Department of Anesthesiology  Preprocedure Note       Name:  Lukas Chavez   Age:  71 y.o.  :  1950                                          MRN:  [id number]         Date:  2023      Surgeon: Katarina Betancourt):  Ricky Vanessa MD    Procedure: Procedure(s):  EGD BIOPSY    Medications prior to admission:   Prior to Admission medications    Medication Sig Start Date End Date Taking?  Authorizing Provider   rosuvastatin (CRESTOR) 5 MG tablet Take 1 tablet by mouth nightly 23   Vipin Silva PA-C   aspirin 81 MG EC tablet TAKE 1 TABLET BY MOUTH EVERY DAY 23   Vipin Silva PA-C   hydrOXYzine HCl (ATARAX) 25 MG tablet TAKE 1 TABLET BY MOUTH EVERY DAY AS NEEDED FOR ANXIETY 23   Vipin Silva PA-C   Wound Dressings (SONAFINE) EMUL APPLY TO HANDS TWICE A DAY 23   Historical Provider, MD   Niacin (VITAMIN B-3 PO) Take by mouth    Historical Provider, MD   albuterol sulfate HFA (PROAIR HFA) 108 (90 Base) MCG/ACT inhaler Inhale 2 puffs into the lungs every 4 hours as needed for Wheezing 1/15/23   DREW Serra CNP   fluticasone (FLONASE) 50 MCG/ACT nasal spray 2 sprays by Each Nostril route daily 1/15/23   DREW Serra CNP   hydroxychloroquine (PLAQUENIL) 200 MG tablet 1 TABLET WITH FOOD OR MILK ORALLY DAILY AT BEDTIME 90 DAYS 10/26/22   Historical Provider, MD   pantoprazole (PROTONIX) 40 MG tablet Take 1 tablet by mouth daily 22   DREW Toth NP   ZINC PO Take by mouth    Historical Provider, MD   albuterol sulfate HFA (PROAIR HFA) 108 (90 Base) MCG/ACT inhaler Inhale 2 puffs into the lungs every 6 hours as needed for Wheezing 20   DREW Thompson CNP   diclofenac sodium (VOLTAREN) 1 % GEL Apply topically 2 times daily 20   DREW Thompson CNP   vitamin C (ASCORBIC ACID) 500 MG tablet Take 500 mg by mouth daily    Historical Provider, MD   valACYclovir (VALTREX) 1 g tablet Take 1,000 mg by mouth 2 times daily Patient uses PRN Historical Provider, MD   Coenzyme Q10 (COQ10 PO) Take by mouth    Historical Provider, MD   Multiple Vitamins-Minerals (MULTIVITAMIN PO) Take by mouth    Historical Provider, MD   vitamin D (ERGOCALCIFEROL) 400 UNITS CAPS Take 400 Units by mouth daily    Historical Provider, MD   calcium carbonate-vitamin D 600-200 MG-UNIT TABS Take  by mouth. Historical Provider, MD       Current medications:    Current Facility-Administered Medications   Medication Dose Route Frequency Provider Last Rate Last Admin    lidocaine PF 1 % injection 1 mL  1 mL IntraDERmal Once PRN [place]mavis Cespedes MD        sodium chloride flush 0.9 % injection 5-40 mL  5-40 mL IntraVENous 2 times per day Erna Sosa MD        sodium chloride flush 0.9 % injection 5-40 mL  5-40 mL IntraVENous PRN Ruthy Cespedes MD        0.9 % sodium chloride infusion   IntraVENous PRN Erna Sosa MD        lactated ringers IV soln infusion   IntraVENous Continuous Erna Sosa MD           Allergies:     Allergies   Allergen Reactions    Nalbuphine      Other reaction(s): claustophobia; confusion    Sulfa Antibiotics Hives    Formaldehyde Rash    Nickel Rash    Morphine And Related Other (See Comments)    Oxycodone        Problem List:    Patient Active Problem List   Diagnosis Code    Abdominal pain, RUQ R10.11    Diverticulosis of colon K57.30    Pneumonia J18.9    Osteopenia M85.80    Hyperlipidemia E78.5    DJD (degenerative joint disease) M19.90    Generalized OA M15.9    Irritable bowel syndrome without diarrhea K58.9    Non-intractable cyclical vomiting with nausea R11.15    Multinodular goiter E04.2    Nausea R11.0    Family history of colon cancer Z80.0    Gastroesophageal reflux disease without esophagitis K21.9    Elevated LFTs R79.89    Scleroderma (HCC) M34.9    Thyroid nodule E04.1    Skin sensation disturbance R20.9    Vitamin D deficiency E55.9    Atherosclerosis of native coronary artery without angina pectoris I25.10    Anxiety F41.9    Hematuria R31.9    Cyst of kidney, acquired N28.1    Increased frequency of urination R35.0    Microscopic hematuria R31.29    Nocturia R35.1    Postmenopausal atrophic vaginitis N95.2       Past Medical History:        Diagnosis Date    Acute [facility] failure with [facility] ([facility] 2020    Anxiety     Diverticulosis of colon     DJD (degenerative joint disease) 2015    Family history of colon cancer     Generalized OA 2015    GERD (gastroesophageal reflux disease)     Hyperlipidemia 2015    Irritable bowel syndrome     Osteoarthritis     Pneumonia due to COVID-19 virus 2020    Scleroderma ([facility]        Past Surgical History:        Procedure Laterality Date    CHOLECYSTECTOMY      COLONOSCOPY      normal    COLONOSCOPY  14    diverticulosis    COLONOSCOPY N/A 2018    COLONOSCOPY POLYPECTOMY COLD BIOPSY performed by Blossom Grover MD at [address] [place] [address]  2018    Spasms in the sigmoid colon. No colitis or ileitis seen.  DILATION AND CURETTAGE OF UTERUS  1970    DILATION AND CURETTAGE OF UTERUS  1991    ENDOSCOPY, COLON, DIAGNOSTIC      JOINT REPLACEMENT Right 2013    hip    TONSILLECTOMY  1954    UPPER GASTROINTESTINAL ENDOSCOPY      normal    UPPER GASTROINTESTINAL ENDOSCOPY  14    esophagitis with bx       Social History:    Social History     Tobacco Use    Smoking status: Former     Packs/day: 1.50     Years: 20.00     Pack years: 30.00     Types: Cigarettes     Quit date: 1990     Years since quittin.1    Smokeless tobacco: Never   Substance Use Topics    Alcohol use: Yes     Comment: Occasional                                Counseling given: Not Answered      Vital Signs (Current): There were no vitals filed for this visit.                                            BP Readings from Last 3 Encounters:   23 124/62   23 132/66   01/15/23 124/76 NPO Status:                                                                                 BMI:   Wt Readings from Last 3 Encounters:   02/06/23 161 lb (73 kg)   01/25/23 161 lb 3.2 oz (73.1 kg)   01/16/23 159 lb (72.1 kg)     There is no height or weight on file to calculate BMI.    CBC:   Lab Results   Component Value Date/Time    WBC 10.6 01/25/2023 10:25 AM    RBC 4.66 01/25/2023 10:25 AM    HGB 13.6 01/25/2023 10:25 AM    HCT 41.3 01/25/2023 10:25 AM    MCV 88.6 01/25/2023 10:25 AM    RDW 11.9 01/25/2023 10:25 AM     01/25/2023 10:25 AM     LR    CMP:   Lab Results   Component Value Date/Time     01/25/2023 10:25 AM    K 4.3 01/25/2023 10:25 AM     01/25/2023 10:25 AM    CO2 27 01/25/2023 10:25 AM    BUN 21 01/25/2023 10:25 AM    CREATININE 0.66 01/25/2023 10:25 AM    GFRAA >60 06/04/2021 09:06 AM    LABGLOM >60 01/25/2023 10:25 AM    GLUCOSE 95 01/25/2023 10:25 AM    PROT 6.9 01/25/2023 10:25 AM    CALCIUM 9.3 01/25/2023 10:25 AM    BILITOT 0.5 01/25/2023 10:25 AM    ALKPHOS 78 01/25/2023 10:25 AM    AST 22 01/25/2023 10:25 AM    ALT 18 01/25/2023 10:25 AM       POC Tests: No results for input(s): POCGLU, POCNA, POCK, POCCL, POCBUN, POCHEMO, POCHCT in the last 72 hours.     Coags:   Lab Results   Component Value Date/Time    PROTIME 12.5 11/22/2020 03:52 PM    INR 0.9 11/22/2020 03:52 PM    APTT 31.7 11/22/2020 03:52 PM       HCG (If Applicable): No results found for: PREGTESTUR, PREGSERUM, HCG, HCGQUANT     ABGs: No results found for: PHART, PO2ART, MWL6EYP, OAP0LOX, BEART, K3HEQHZO     Type & Screen (If Applicable):  No results found for: LABABO, LABRH    Drug/Infectious Status (If Applicable):  Lab Results   Component Value Date/Time    HEPCAB NONREACTIVE 01/16/2017 09:53 AM       COVID-19 Screening (If Applicable):   Lab Results   Component Value Date/Time    COVID19 Not Detected 01/05/2023 09:26 AM    COVID19 Detected 11/16/2020 01:19 PM           Anesthesia Evaluation  Patient summary reviewed and Nursing notes reviewed no history of anesthetic complications:   Airway: Mallampati: II  TM distance: >3 FB   Neck ROM: full  Mouth opening: > = 3 FB   Dental: normal exam         Pulmonary:normal exam  breath sounds clear to auscultation  (+) pneumonia: resolved and no interval change,      (-) COPD, asthma, shortness of breath, recent URI, sleep apnea, rhonchi, wheezes, rales, stridor, not a current smoker and no decreased breath sounds          Patient did not smoke on day of surgery. Cardiovascular:  Exercise tolerance: good (>4 METS),   (+) CAD: no interval change,     (-) pacemaker, hypertension, valvular problems/murmurs, past MI, CABG/stent, dysrhythmias,  angina,  CHF, orthopnea, PND,  BUSCH, murmur, weak pulses,  friction rub, systolic click, carotid bruit,  JVD, peripheral edema, no pulmonary hypertension and no hyperlipidemia    ECG reviewed  Rhythm: regular  Rate: normal           Beta Blocker:  Not on Beta Blocker         Neuro/Psych:   Negative Neuro/Psych ROS     (-) seizures, neuromuscular disease, TIA, CVA, headaches, psychiatric history and depression/anxiety            GI/Hepatic/Renal:   (+) GERD: no interval change,      (-) hiatal hernia, PUD, hepatitis, liver disease, no renal disease, bowel prep and no morbid obesity       Endo/Other:    (+) : arthritis: OA., no malignancy/cancer. (-) diabetes mellitus, hypothyroidism, hyperthyroidism, blood dyscrasia, no electrolyte abnormalities, no malignancy/cancer               Abdominal:             Vascular: negative vascular ROS. - PVD, DVT and PE. Other Findings:           Anesthesia Plan      general     ASA 2       Induction: intravenous. MIPS: Postoperative opioids intended and Prophylactic antiemetics administered. Anesthetic plan and risks discussed with patient. Plan discussed with CRNA.                     Steph Miller MD   2/8/2023

## 2023-02-09 LAB — SURGICAL PATHOLOGY REPORT: NORMAL

## 2023-02-10 ENCOUNTER — TELEPHONE (OUTPATIENT)
Dept: GASTROENTEROLOGY | Age: 73
End: 2023-02-10

## 2023-02-10 NOTE — TELEPHONE ENCOUNTER
Patient had her yearly Follow up for GERD and Dr Alondra Sloan did an EGD and the hospital said 3-4 week follow up as they always do. Writer called her back stating her EGD was good and she can continue the 40 mg Protonix daily but she states she takes it every other day and it works for her. Writer put in a recall for a yearly appointment for GERD.

## 2023-03-24 ENCOUNTER — OFFICE VISIT (OUTPATIENT)
Dept: FAMILY MEDICINE CLINIC | Age: 73
End: 2023-03-24

## 2023-03-24 ENCOUNTER — HOSPITAL ENCOUNTER (OUTPATIENT)
Age: 73
Setting detail: SPECIMEN
Discharge: HOME | End: 2023-03-24

## 2023-03-24 VITALS — DIASTOLIC BLOOD PRESSURE: 72 MMHG | OXYGEN SATURATION: 95 % | SYSTOLIC BLOOD PRESSURE: 115 MMHG | HEART RATE: 60 BPM

## 2023-03-24 DIAGNOSIS — Z20.822 SUSPECTED COVID-19 VIRUS INFECTION: ICD-10-CM

## 2023-03-24 DIAGNOSIS — B34.9 VIRAL ILLNESS: Primary | ICD-10-CM

## 2023-03-24 ASSESSMENT — ENCOUNTER SYMPTOMS
VOICE CHANGE: 0
SHORTNESS OF BREATH: 0
COUGH: 1
TROUBLE SWALLOWING: 0
VOMITING: 0
EYE PAIN: 0
NAUSEA: 0
RHINORRHEA: 0
CHEST TIGHTNESS: 0
SORE THROAT: 1

## 2023-03-24 NOTE — PROGRESS NOTES
BIOPSIES:  - NORMAL SQUAMOUS MUCOSA. Flavia Crespo. Jung King M.D.  **Electronically Signed Out**         sls/2/9/2023       Clinical Information  Pre-op Diagnosis:  GASTROESOPHAGEAL REFLUX DISEASE, UNSPECIFIED  WHETHER ESOPHAGITIS PRESENT    Operative Findings:  ESOPHAGUS    Operation Performed:  EGD BIOPSY   tm     Source of Specimen  A: ESOPHAGUS    Gross Description  \"EILEEN GUNDERSON, ESOPHAGUS\" Six tan-white tissue fragments from 0.2 to  0.5 cm and are 1.7 x 0.3 x 0.1 cm in aggregate. Entirely 1cs. mm tm      Microscopic Description  The squamous mucosa appears normal.  There is no evidence of active  inflammation, eosinophilic inflammation, ulcer, dysplasia or  neoplasia. SURGICAL PATHOLOGY CONSULTATION       Patient Name: [facility] Rec: [id number]  Path Number: [id number]    [facility]  [facility]  ANATOMIC PATHOLOGY  [address] [place] [address], [facility] [address] [place], [address] [facility]  [phone]  Fax: [phone]       Based on the history and exam, I suspect COVID-19/ viral illness. Will send out COVID19 testing. Possible treatment alterations based on the results. Patient instructed to self-quarantine until testing results are back- and to follow the quarantine instructions in the after visit summary. Tylenol / Motrin as directed on the bottle as needed for fever/pain. May use OTC medications for symptom management. She is outside the 5 day window for Paxlovid. Increase fluids, rest.   The patient indicates understanding of these issues and agrees with the plan. Educational materials provided on AVS.  Follow up if symptoms do not improve/worsen. Call with any questions or concerns. Discussed symptoms that will warrant urgent ED evaluation/treatment. Preventing the Spread of Coronavirus Disease 2019 in Homes and Residential Communities:   For the most recent information go to:

## 2023-03-25 DIAGNOSIS — B34.9 VIRAL ILLNESS: ICD-10-CM

## 2023-03-25 DIAGNOSIS — Z20.822 SUSPECTED COVID-19 VIRUS INFECTION: ICD-10-CM

## 2023-03-25 LAB
SARS-COV-2 RNA RESP QL NAA+PROBE: ABNORMAL
SARS-COV-2 RNA RESP QL NAA+PROBE: DETECTED
SOURCE: ABNORMAL

## 2023-07-25 SDOH — HEALTH STABILITY: PHYSICAL HEALTH: ON AVERAGE, HOW MANY MINUTES DO YOU ENGAGE IN EXERCISE AT THIS LEVEL?: 150+ MIN

## 2023-07-25 SDOH — HEALTH STABILITY: PHYSICAL HEALTH: ON AVERAGE, HOW MANY DAYS PER WEEK DO YOU ENGAGE IN MODERATE TO STRENUOUS EXERCISE (LIKE A BRISK WALK)?: 2 DAYS

## 2023-07-25 ASSESSMENT — SOCIAL DETERMINANTS OF HEALTH (SDOH)
WITHIN THE LAST YEAR, HAVE TO BEEN RAPED OR FORCED TO HAVE ANY KIND OF SEXUAL ACTIVITY BY YOUR PARTNER OR EX-PARTNER?: PATIENT DECLINED
WITHIN THE LAST YEAR, HAVE YOU BEEN HUMILIATED OR EMOTIONALLY ABUSED IN OTHER WAYS BY YOUR PARTNER OR EX-PARTNER?: PATIENT DECLINED
WITHIN THE LAST YEAR, HAVE YOU BEEN AFRAID OF YOUR PARTNER OR EX-PARTNER?: PATIENT DECLINED
WITHIN THE LAST YEAR, HAVE YOU BEEN KICKED, HIT, SLAPPED, OR OTHERWISE PHYSICALLY HURT BY YOUR PARTNER OR EX-PARTNER?: PATIENT DECLINED

## 2023-07-26 ENCOUNTER — OFFICE VISIT (OUTPATIENT)
Dept: FAMILY MEDICINE CLINIC | Age: 73
End: 2023-07-26

## 2023-07-26 VITALS
RESPIRATION RATE: 24 BRPM | TEMPERATURE: 98 F | SYSTOLIC BLOOD PRESSURE: 120 MMHG | OXYGEN SATURATION: 94 % | DIASTOLIC BLOOD PRESSURE: 64 MMHG | WEIGHT: 167.6 LBS | BODY MASS INDEX: 30.65 KG/M2 | HEART RATE: 60 BPM

## 2023-07-26 DIAGNOSIS — J43.2 CENTRILOBULAR EMPHYSEMA (HCC): ICD-10-CM

## 2023-07-26 DIAGNOSIS — R31.21 ASYMPTOMATIC MICROSCOPIC HEMATURIA: ICD-10-CM

## 2023-07-26 DIAGNOSIS — E78.2 MIXED HYPERLIPIDEMIA: Primary | ICD-10-CM

## 2023-07-26 DIAGNOSIS — R80.9 MICROALBUMINURIA: ICD-10-CM

## 2023-07-26 PROBLEM — R35.0 INCREASED FREQUENCY OF URINATION: Status: RESOLVED | Noted: 2022-02-15 | Resolved: 2023-07-26

## 2023-07-26 PROBLEM — R31.9 HEMATURIA: Status: RESOLVED | Noted: 2022-04-18 | Resolved: 2023-07-26

## 2023-07-26 ASSESSMENT — PATIENT HEALTH QUESTIONNAIRE - PHQ9
SUM OF ALL RESPONSES TO PHQ9 QUESTIONS 1 & 2: 0
SUM OF ALL RESPONSES TO PHQ QUESTIONS 1-9: 0
1. LITTLE INTEREST OR PLEASURE IN DOING THINGS: 0
SUM OF ALL RESPONSES TO PHQ QUESTIONS 1-9: 0
2. FEELING DOWN, DEPRESSED OR HOPELESS: 0
SUM OF ALL RESPONSES TO PHQ QUESTIONS 1-9: 0
SUM OF ALL RESPONSES TO PHQ QUESTIONS 1-9: 0

## 2023-07-26 NOTE — PROGRESS NOTES
Subjective:      Patient ID: Biju Enriquez is a 68 y.o. female. Visit Information    Have you changed or started any medications since your last visit including any over-the-counter medicines, vitamins, or herbal medicines? no   Are you having any side effects from any of your medications? -  no  Have you stopped taking any of your medications? Is so, why? -  no    Have you seen any other physician or provider since your last visit? Yes - Records Obtained  Have you had any other diagnostic tests since your last visit? Yes - Records Obtained  Have you been seen in the emergency room and/or had an admission to a hospital since we last saw you? No  Have you had your routine dental cleaning in the past 6 months? yes -     Have you activated your [employer] account? If not, what are your barriers?  Yes     Patient Care Team:  Mark Dang PA-C as PCP - General (Physician Assistant)  Mark Dang PA-C as PCP - Empaneled Provider  Erica Child MD as Consulting Physician (Gastroenterology)  Cherrie Abbasi MD (Dermatology)  Patti Pang MD (Inactive) as Consulting Physician (Oral Maxillofacial Surgery)    Medical History Review  Past Medical, Family, and Social History reviewed and does contribute to the patient presenting condition    Health Maintenance   Topic Date Due    DTaP/Tdap/Td vaccine (1 - Tdap) Never done    Flu vaccine (1) 08/01/2023    Annual Wellness Visit (AWV)  10/19/2023    Lipids  01/25/2024    Depression Screen  07/26/2024    Breast cancer screen  10/27/2024    Colorectal Cancer Screen  02/09/2028    DEXA (modify frequency per FRAX score)  Completed    Shingles vaccine  Completed    Pneumococcal 65+ years Vaccine  Completed    COVID-19 Vaccine  Completed    Hepatitis C screen  Completed    Hepatitis A vaccine  Aged Out    Hib vaccine  Aged Out    Meningococcal (ACWY) vaccine  Aged Out    Diabetes screen  Discontinued       HPI    68year old female with hx of scleroderma presents

## 2023-07-27 PROBLEM — R11.0 NAUSEA: Status: RESOLVED | Noted: 2017-02-08 | Resolved: 2023-07-27

## 2023-07-27 ASSESSMENT — ENCOUNTER SYMPTOMS
ABDOMINAL PAIN: 0
WHEEZING: 0
NAUSEA: 0
SHORTNESS OF BREATH: 0
VOMITING: 0

## 2023-08-18 ENCOUNTER — TELEPHONE (OUTPATIENT)
Dept: FAMILY MEDICINE CLINIC | Age: 73
End: 2023-08-18

## 2023-08-18 NOTE — TELEPHONE ENCOUNTER
Patient called asking about the RSV vaccine and the next Covid 19 vaccine. She wanted to know if she should get these vaccines. Please advise.

## 2023-08-27 ENCOUNTER — HOSPITAL ENCOUNTER (OUTPATIENT)
Dept: SLEEP CENTER | Age: 73
Discharge: HOME | End: 2023-08-29
Payer: MEDICARE

## 2023-08-27 PROCEDURE — 95810 POLYSOM 6/> YRS 4/> PARAM: CPT

## 2023-08-27 NOTE — PAYOR INFORMATION
Verified Demographics with Patient? No   If no why? Already verified  [facility] Completed? No    If not why? Already completed  Verified Insurance through: Already verified  [facility] Completed? Not required  Auth Verification #:[id number]  Liability Due: $0  Discount Offered: na%       Previous Balance: $ 0   Discount Offered: na%  Site Collect Status: na  Patient Response: na  Financial Aid Offered?  Yes Pt declined  Cards Scanned: yes

## 2023-08-28 VITALS
HEART RATE: 57 BPM | WEIGHT: 164 LBS | RESPIRATION RATE: 14 BRPM | BODY MASS INDEX: 30.18 KG/M2 | HEIGHT: 62 IN | OXYGEN SATURATION: 92 %

## 2023-08-28 ASSESSMENT — SLEEP AND FATIGUE QUESTIONNAIRES
ESS TOTAL SCORE: 5
HOW LIKELY ARE YOU TO NOD OFF OR FALL ASLEEP WHILE LYING DOWN TO REST IN THE AFTERNOON WHEN CIRCUMSTANCES PERMIT: 1
HOW LIKELY ARE YOU TO NOD OFF OR FALL ASLEEP WHILE SITTING QUIETLY AFTER LUNCH WITHOUT ALCOHOL: 1
HOW LIKELY ARE YOU TO NOD OFF OR FALL ASLEEP IN A CAR, WHILE STOPPED FOR A FEW MINUTES IN TRAFFIC: 0
HOW LIKELY ARE YOU TO NOD OFF OR FALL ASLEEP WHILE SITTING INACTIVE IN A PUBLIC PLACE: 0
HOW LIKELY ARE YOU TO NOD OFF OR FALL ASLEEP WHILE WATCHING TV: 1
HOW LIKELY ARE YOU TO NOD OFF OR FALL ASLEEP WHILE SITTING AND TALKING TO SOMEONE: 0
HOW LIKELY ARE YOU TO NOD OFF OR FALL ASLEEP WHEN YOU ARE A PASSENGER IN A CAR FOR AN HOUR WITHOUT A BREAK: 0
HOW LIKELY ARE YOU TO NOD OFF OR FALL ASLEEP WHILE SITTING AND READING: 2

## 2023-08-31 LAB — STATUS: NORMAL

## 2023-09-27 DIAGNOSIS — I25.10 ATHEROSCLEROSIS OF NATIVE CORONARY ARTERY WITHOUT ANGINA PECTORIS, UNSPECIFIED WHETHER NATIVE OR TRANSPLANTED HEART: ICD-10-CM

## 2023-09-27 RX ORDER — ROSUVASTATIN CALCIUM 5 MG/1
5 TABLET, COATED ORAL NIGHTLY
Qty: 90 TABLET | Refills: 1 | OUTPATIENT
Start: 2023-09-27

## 2023-09-28 DIAGNOSIS — I25.10 ATHEROSCLEROSIS OF NATIVE CORONARY ARTERY WITHOUT ANGINA PECTORIS, UNSPECIFIED WHETHER NATIVE OR TRANSPLANTED HEART: ICD-10-CM

## 2023-09-28 RX ORDER — ROSUVASTATIN CALCIUM 5 MG/1
5 TABLET, COATED ORAL NIGHTLY
Qty: 90 TABLET | Refills: 1 | Status: SHIPPED | OUTPATIENT
Start: 2023-09-28

## 2023-10-23 SDOH — HEALTH STABILITY: PHYSICAL HEALTH: ON AVERAGE, HOW MANY MINUTES DO YOU ENGAGE IN EXERCISE AT THIS LEVEL?: 50 MIN

## 2023-10-23 SDOH — HEALTH STABILITY: PHYSICAL HEALTH: ON AVERAGE, HOW MANY DAYS PER WEEK DO YOU ENGAGE IN MODERATE TO STRENUOUS EXERCISE (LIKE A BRISK WALK)?: 3 DAYS

## 2023-10-23 ASSESSMENT — PATIENT HEALTH QUESTIONNAIRE - PHQ9
SUM OF ALL RESPONSES TO PHQ QUESTIONS 1-9: 1
2. FEELING DOWN, DEPRESSED OR HOPELESS: 0
SUM OF ALL RESPONSES TO PHQ QUESTIONS 1-9: 1
SUM OF ALL RESPONSES TO PHQ9 QUESTIONS 1 & 2: 1
SUM OF ALL RESPONSES TO PHQ QUESTIONS 1-9: 1
1. LITTLE INTEREST OR PLEASURE IN DOING THINGS: 1
SUM OF ALL RESPONSES TO PHQ QUESTIONS 1-9: 1

## 2023-10-23 ASSESSMENT — LIFESTYLE VARIABLES
HOW OFTEN DO YOU HAVE A DRINK CONTAINING ALCOHOL: 2
HOW MANY STANDARD DRINKS CONTAINING ALCOHOL DO YOU HAVE ON A TYPICAL DAY: 1
HOW OFTEN DO YOU HAVE SIX OR MORE DRINKS ON ONE OCCASION: 1
HOW MANY STANDARD DRINKS CONTAINING ALCOHOL DO YOU HAVE ON A TYPICAL DAY: 1 OR 2
HOW OFTEN DO YOU HAVE A DRINK CONTAINING ALCOHOL: MONTHLY OR LESS

## 2023-10-24 PROBLEM — D72.829 ELEVATED WHITE BLOOD CELL COUNT, UNSPECIFIED: Status: ACTIVE | Noted: 2023-10-24

## 2023-10-25 ENCOUNTER — OFFICE VISIT (OUTPATIENT)
Dept: FAMILY MEDICINE CLINIC | Age: 73
End: 2023-10-25
Payer: MEDICARE

## 2023-10-25 ENCOUNTER — TELEPHONE (OUTPATIENT)
Dept: FAMILY MEDICINE CLINIC | Age: 73
End: 2023-10-25

## 2023-10-25 VITALS
DIASTOLIC BLOOD PRESSURE: 60 MMHG | WEIGHT: 165.8 LBS | HEART RATE: 72 BPM | SYSTOLIC BLOOD PRESSURE: 122 MMHG | OXYGEN SATURATION: 99 % | HEIGHT: 62 IN | BODY MASS INDEX: 30.51 KG/M2 | TEMPERATURE: 98 F | RESPIRATION RATE: 24 BRPM

## 2023-10-25 DIAGNOSIS — Z78.0 POST-MENOPAUSAL: Primary | ICD-10-CM

## 2023-10-25 DIAGNOSIS — Z00.00 MEDICARE ANNUAL WELLNESS VISIT, SUBSEQUENT: ICD-10-CM

## 2023-10-25 DIAGNOSIS — Z12.31 SCREENING MAMMOGRAM FOR BREAST CANCER: ICD-10-CM

## 2023-10-25 DIAGNOSIS — Z23 NEED FOR INFLUENZA VACCINATION: ICD-10-CM

## 2023-10-25 PROCEDURE — G0439 PPPS, SUBSEQ VISIT: HCPCS | Performed by: NURSE PRACTITIONER

## 2023-10-25 PROCEDURE — G8484 FLU IMMUNIZE NO ADMIN: HCPCS | Performed by: NURSE PRACTITIONER

## 2023-10-25 PROCEDURE — 1123F ACP DISCUSS/DSCN MKR DOCD: CPT | Performed by: NURSE PRACTITIONER

## 2023-10-25 PROCEDURE — 3017F COLORECTAL CA SCREEN DOC REV: CPT | Performed by: NURSE PRACTITIONER

## 2023-10-25 RX ORDER — HALOBETASOL PROPIONATE 0.1 MG/G
LOTION TOPICAL
COMMUNITY

## 2023-10-25 RX ORDER — VITAMIN A 10000 UNIT
TABLET ORAL
COMMUNITY

## 2023-10-25 NOTE — PROGRESS NOTES
valACYclovir (VALTREX) 1 g tablet Take 1 tablet by mouth 2 times daily Patient uses PRN Yes Provider, MD Tej   vitamin D (ERGOCALCIFEROL) 400 UNITS CAPS Take 1 capsule by mouth daily  Provider, Historical, MD       [facility] (Including outside providers/suppliers regularly involved in providing care):   Patient Care Team:  DREW Morales NP as PCP - General (Family Nurse Practitioner)  DREW Morales NP as PCP - Empaneled Provider  [facility] as Consulting Physician (Gastroenterology)  Jose Arreguin MD (Dermatology)  Peter Conway MD as Consulting Physician (Oral Maxillofacial Surgery)     Reviewed and updated this visit:  Tobacco  Allergies  Meds  Problems  Med Hx  Surg Hx  Soc Hx  Fam Hx

## 2023-10-25 NOTE — TELEPHONE ENCOUNTER
THE PATIENT WANTED YOU TO KNOW THAT SHE HAD A DEXA SCAN DONE ON 05/16/22 AND WILL NOT BE DUE FOR ANOTHER ONE UNTIL 05/16/24.

## 2023-10-25 NOTE — PATIENT INSTRUCTIONS
lifestyle, illnesses that may run in your family, and various assessments and screenings as appropriate. After reviewing your medical record and screening and assessments performed today your provider may have ordered immunizations, labs, imaging, and/or referrals for you. A list of these orders (if applicable) as well as your Preventive Care list are included within your After Visit Summary for your review. Other Preventive Recommendations:    A preventive eye exam performed by an eye specialist is recommended every 1-2 years to screen for glaucoma; cataracts, macular degeneration, and other eye disorders. A preventive dental visit is recommended every 6 months. Try to get at least 150 minutes of exercise per week or 10,000 steps per day on a pedometer . Order or download the FREE \"Exercise & Physical Activity: Your Everyday Guide\" from [employer]. Call 9-297.754.1124 or search [employer] online. You need 9933-5976 mg of calcium and 8275-5318 IU of vitamin D per day. It is possible to meet your calcium requirement with diet alone, but a vitamin D supplement is usually necessary to meet this goal.  When exposed to the sun, use a sunscreen that protects against both UVA and UVB radiation with an SPF of 30 or greater. Reapply every 2 to 3 hours or after sweating, drying off with a towel, or swimming. Always wear a seat belt when traveling in a car. Always wear a helmet when riding a bicycle or motorcycle.

## 2023-11-06 ENCOUNTER — HOSPITAL ENCOUNTER (OUTPATIENT)
Dept: MAMMOGRAPHY | Age: 73
Discharge: HOME | End: 2023-11-08
Payer: MEDICARE

## 2023-11-06 VITALS — WEIGHT: 165 LBS | BODY MASS INDEX: 31.15 KG/M2 | HEIGHT: 61 IN

## 2023-11-06 DIAGNOSIS — Z12.31 ENCOUNTER FOR SCREENING MAMMOGRAM FOR BREAST CANCER: ICD-10-CM

## 2023-11-06 LAB
ALBUMIN, U: ABNORMAL MG/DL
ALBUMIN: 4.3 G/DL
BACTERIA, URINE: ABNORMAL /HPF
BASOPHILS ABSOLUTE: 0.03 X10^3UL
BASOPHILS RELATIVE PERCENT: 0.3 %
BILIRUBIN, URINE: ABNORMAL
BUN BLDV-MCNC: 19 MG/DL
CALCIUM SERPL-MCNC: 9.4 MG/DL
CHARACTER, URINE: ABNORMAL
CHLORIDE BLD-SCNC: 107 MMOL/L
CO2: 25 MMOL/L
COLOR, URINE: ABNORMAL
CREAT SERPL-MCNC: 0.67 MG/DL
CREATININE URINE: 49.7 MG/DL
EOSINOPHILS ABSOLUTE: 0.21 X10^3UL
EOSINOPHILS RELATIVE PERCENT: 2.3 %
ERYTHROCYTE [DISTWIDTH] IN BLOOD BY AUTOMATED COUNT: 40.6 FL
GFR AFRICAN AMERICAN: 104.7 ML/M1.7
GFR NON-AFRICAN AMERICAN: 86.5 ML/M1.7
GLUCOSE, URINE: ABNORMAL MG/DL
GLUCOSE: 95 MG/DL
HCT VFR BLD CALC: 41.8 %
HEMOGLOBIN: 13.7 G/DL
IMMATURE GRANULOCYTES %: 0.3 %
IMMATURE GRANULOCYTES ABSOLUTE: 0.03 X10^3UL
KETONES, URINE: ABNORMAL MG/DL
LEUKOCYTE ESTERASE, URINE: ABNORMAL
LYMPHOCYTES ABSOLUTE: 2.52 X10^3UL
LYMPHOCYTES RELATIVE PERCENT: 28 %
MAGNESIUM: 2 MG/DL
MCH RBC QN AUTO: 30.4 PG
MCHC RBC AUTO-ENTMCNC: 32.8 G/DL
MCV RBC AUTO: 92.7 FL
MICROALBUMIN/CREAT 24H UR: 4.48 MG/DL
MICROALBUMIN/CREAT UR-RTO: 90.1 MG/G CR
MONOCYTES ABSOLUTE: 0.77 X10^3UL
MONOCYTES RELATIVE PERCENT: 8.6 %
MUCUS, URINE: ABNORMAL /HPF
NEUTROPHILS ABSOLUTE: 5.44 X10^3UL
NITRITE, URINE: ABNORMAL
OCCULT BLOOD,URINE: ABNORMAL
PH, URINE: 5.5
PLATELETS: 284 X10^3UL
POTASSIUM SERPL-SCNC: 4.5 MMOL/L
RBC URINE: ABNORMAL /HPF
RBC: 4.51 X10^6UL
SEGMENTED NEUTROPHILS RELATIVE PERCENT: 60.5 %
SODIUM BLD-SCNC: 143 MMOL/L
SPECIFIC GRAVITY UA: 1.02
SQUAMOUS EPITHELIAL CELLS: ABNORMAL /HPF
URINE COLLECTION: ABNORMAL
UROBILINOGEN, URINE: 0.2 MG/DL
VITAMIN D 25-HYDROXY: 51.5 NG/ML
WBC URINE: ABNORMAL /HPF
WBC: 8.99 X10^3UL

## 2023-11-06 PROCEDURE — 77063 BREAST TOMOSYNTHESIS BI: CPT

## 2023-11-28 RX ORDER — PANTOPRAZOLE SODIUM 40 MG/1
40 TABLET, DELAYED RELEASE ORAL DAILY
Qty: 90 TABLET | Refills: 3 | Status: SHIPPED | OUTPATIENT
Start: 2023-11-28

## 2023-12-26 ENCOUNTER — TELEPHONE (OUTPATIENT)
Dept: FAMILY MEDICINE CLINIC | Age: 73
End: 2023-12-26

## 2023-12-26 ENCOUNTER — OFFICE VISIT (OUTPATIENT)
Dept: FAMILY MEDICINE CLINIC | Age: 73
End: 2023-12-26
Payer: MEDICARE

## 2023-12-26 VITALS
DIASTOLIC BLOOD PRESSURE: 72 MMHG | RESPIRATION RATE: 14 BRPM | WEIGHT: 170.2 LBS | OXYGEN SATURATION: 93 % | HEIGHT: 61 IN | TEMPERATURE: 97.6 F | HEART RATE: 54 BPM | BODY MASS INDEX: 32.13 KG/M2 | SYSTOLIC BLOOD PRESSURE: 118 MMHG

## 2023-12-26 DIAGNOSIS — R05.1 ACUTE COUGH: ICD-10-CM

## 2023-12-26 DIAGNOSIS — J40 BRONCHITIS: Primary | ICD-10-CM

## 2023-12-26 PROCEDURE — 1036F TOBACCO NON-USER: CPT | Performed by: NURSE PRACTITIONER

## 2023-12-26 PROCEDURE — G8417 CALC BMI ABV UP PARAM F/U: HCPCS | Performed by: NURSE PRACTITIONER

## 2023-12-26 PROCEDURE — G8484 FLU IMMUNIZE NO ADMIN: HCPCS | Performed by: NURSE PRACTITIONER

## 2023-12-26 PROCEDURE — 3017F COLORECTAL CA SCREEN DOC REV: CPT | Performed by: NURSE PRACTITIONER

## 2023-12-26 PROCEDURE — 99213 OFFICE O/P EST LOW 20 MIN: CPT | Performed by: NURSE PRACTITIONER

## 2023-12-26 PROCEDURE — 1090F PRES/ABSN URINE INCON ASSESS: CPT | Performed by: NURSE PRACTITIONER

## 2023-12-26 PROCEDURE — G8399 PT W/DXA RESULTS DOCUMENT: HCPCS | Performed by: NURSE PRACTITIONER

## 2023-12-26 PROCEDURE — 1123F ACP DISCUSS/DSCN MKR DOCD: CPT | Performed by: NURSE PRACTITIONER

## 2023-12-26 PROCEDURE — G8427 DOCREV CUR MEDS BY ELIG CLIN: HCPCS | Performed by: NURSE PRACTITIONER

## 2023-12-26 RX ORDER — AZITHROMYCIN 250 MG/1
250 TABLET, FILM COATED ORAL SEE ADMIN INSTRUCTIONS
Qty: 6 TABLET | Refills: 0 | Status: SHIPPED | OUTPATIENT
Start: 2023-12-26 | End: 2023-12-31

## 2023-12-26 RX ORDER — ALBUTEROL SULFATE 90 UG/1
2 AEROSOL, METERED RESPIRATORY (INHALATION) 4 TIMES DAILY PRN
Qty: 18 G | Refills: 0 | Status: SHIPPED | OUTPATIENT
Start: 2023-12-26

## 2023-12-26 NOTE — TELEPHONE ENCOUNTER
Patient called wanting an appt for a cough for a month, sometimes productive, yellow clear phlegm. Slight wheeze. Has been taking a Rx for penicillin from her dentist and it has not worked. Taking Nyquil and a daily cold med once daily. Patient was advised to go to a [address] [place] [address] for revaluation and treatment. Patient voiced understanding  and stated that she would go.

## 2023-12-28 NOTE — PROGRESS NOTES
Monocytes % 8.6 () %    Eosinophils % 2.3 () %    Basophils % 0.3 () %    Immature Granulocytes % 0.3 () %    Neutrophils Absolute 5.44 (1.50  - 7.00) x10^3ul    Lymphocytes Absolute 2.52 (0.96  - 5.40) x10^3ul    Monocytes Absolute 0.77 (0.10  - 1.00) x10^3ul    Eosinophils Absolute 0.21 (0.00  - 0.40) x10^3ul    Basophils Absolute 0.03 (0.00  - 0.16) x10^3ul    IMMATURE GRANULOCYTES ABSOLUTE 0.03 (0.00  - 0.11) x10^3ul   Urinalysis   Result Value Ref Range    Color, Urine YEL (P YEL - L AMB)    Character, Urine HAZY (CLEAR - HAZY)    Specific Gravity, UA 1.020 (1.001 - 1.035)    pH, Urine 5.5 (5.0   - 9.0)    Albumin, U NEG (NONE  - NEG) MG/DL    Glucose, Urine NEG (NEG) MG/DL    Ketones, Urine NEG (NEG) MG/DL    Bilirubin, Urine NEG (NEG)    Urobilinogen, Urine 0.2 (0.2   - <2.0) MG/DL    Occult Blood,Urine MOD (H) (NEG)    Nitrite, Urine NEG (NEG)    Leukocyte Esterase, Urine TRACE (H) (NEG)    WBC, UA 5-10 (H) (0     - 4) /HPF    RBC, UA 0-2 (0     - 2) /HPF    Squamous Epithelial Cells OCC (NONE  - OCC) /HPF    BACTERIA, URINE OCC (NONE  - OCC) /HPF    MUCUS, URINE OCC (NONE  - OCC) /HPF   Basic Metabolic Panel   Result Value Ref Range    Glucose 95 (74    - 106) MG/DL    BUN 19 (H) (7     - 17) MG/DL    Creatinine 0.67 (0.52  - 1.04) MG/DL    Sodium 143 (137   - 145) MMOL/L    Potassium 4.5 (3.5   - 5.1) MMOL/L    Chloride 107 (98    - 107) MMOL/L    CO2 25 (22    - 30) MMOL/L    Calcium 9.4 (8.6   - 10.6) MG/DL    GFR Non- 86.5 (60.0  - 115.8) ML/M1.7    GFR  104.7 (60.0  - 140.1) ML/M1.7   Microalbumin / Creatinine Urine Ratio   Result Value Ref Range    Urine Collection RANDOM (AS    - NOTED)    Creatinine, Ur 49.7 () MG/DL    Microalb, Ur 4.48 (H) (0.0   - 1.70) MG/DL    Microalbumin Creatinine Ratio 90.1 (H) (0.0   - 30.0) MG/G CR   Vitamin D 25 Hydroxy   Result Value Ref Range    Vit D, 25-Hydroxy 51.5 (30.0  - 100.0) NG/ML   Albumin   Result Value Ref Range    Albumin

## 2023-12-28 NOTE — TELEPHONE ENCOUNTER
The patient went to the [address] [place] [address] in [address] [place] [address] 12/27/23.  She was evaluated and treated for the cough symptoms

## 2024-01-08 ENCOUNTER — OFFICE VISIT (OUTPATIENT)
Dept: FAMILY MEDICINE CLINIC | Age: 74
End: 2024-01-08
Payer: MEDICARE

## 2024-01-08 ENCOUNTER — HOSPITAL ENCOUNTER (OUTPATIENT)
Age: 74
Setting detail: SPECIMEN
Discharge: HOME | End: 2024-01-08

## 2024-01-08 VITALS
RESPIRATION RATE: 20 BRPM | WEIGHT: 168.2 LBS | OXYGEN SATURATION: 94 % | BODY MASS INDEX: 31.78 KG/M2 | DIASTOLIC BLOOD PRESSURE: 66 MMHG | SYSTOLIC BLOOD PRESSURE: 126 MMHG | HEART RATE: 60 BPM | TEMPERATURE: 97.7 F

## 2024-01-08 DIAGNOSIS — N30.01 ACUTE CYSTITIS WITH HEMATURIA: ICD-10-CM

## 2024-01-08 DIAGNOSIS — M34.9 SCLERODERMA (HCC): ICD-10-CM

## 2024-01-08 DIAGNOSIS — N30.01 ACUTE CYSTITIS WITH HEMATURIA: Primary | ICD-10-CM

## 2024-01-08 LAB
BILIRUBIN, POC: ABNORMAL
BLOOD URINE, POC: ABNORMAL
CLARITY, POC: ABNORMAL
COLOR, POC: ABNORMAL
GLUCOSE URINE, POC: ABNORMAL
KETONES, POC: ABNORMAL
LEUKOCYTE EST, POC: ABNORMAL
NITRITE, POC: ABNORMAL
PH, POC: 7.5
PROTEIN, POC: ABNORMAL
SPECIFIC GRAVITY, POC: 1
UROBILINOGEN, POC: ABNORMAL

## 2024-01-08 PROCEDURE — 81002 URINALYSIS NONAUTO W/O SCOPE: CPT | Performed by: NURSE PRACTITIONER

## 2024-01-08 PROCEDURE — 3017F COLORECTAL CA SCREEN DOC REV: CPT | Performed by: NURSE PRACTITIONER

## 2024-01-08 PROCEDURE — 1123F ACP DISCUSS/DSCN MKR DOCD: CPT | Performed by: NURSE PRACTITIONER

## 2024-01-08 PROCEDURE — G8417 CALC BMI ABV UP PARAM F/U: HCPCS | Performed by: NURSE PRACTITIONER

## 2024-01-08 PROCEDURE — G8399 PT W/DXA RESULTS DOCUMENT: HCPCS | Performed by: NURSE PRACTITIONER

## 2024-01-08 PROCEDURE — 1036F TOBACCO NON-USER: CPT | Performed by: NURSE PRACTITIONER

## 2024-01-08 PROCEDURE — 99213 OFFICE O/P EST LOW 20 MIN: CPT | Performed by: NURSE PRACTITIONER

## 2024-01-08 PROCEDURE — G8484 FLU IMMUNIZE NO ADMIN: HCPCS | Performed by: NURSE PRACTITIONER

## 2024-01-08 PROCEDURE — 1090F PRES/ABSN URINE INCON ASSESS: CPT | Performed by: NURSE PRACTITIONER

## 2024-01-08 PROCEDURE — G8428 CUR MEDS NOT DOCUMENT: HCPCS | Performed by: NURSE PRACTITIONER

## 2024-01-08 RX ORDER — NITROFURANTOIN 25; 75 MG/1; MG/1
100 CAPSULE ORAL 2 TIMES DAILY
Qty: 14 CAPSULE | Refills: 0 | Status: SHIPPED | OUTPATIENT
Start: 2024-01-08 | End: 2024-01-15

## 2024-01-08 ASSESSMENT — PATIENT HEALTH QUESTIONNAIRE - PHQ9
1. LITTLE INTEREST OR PLEASURE IN DOING THINGS: 0
SUM OF ALL RESPONSES TO PHQ9 QUESTIONS 1 & 2: 0
SUM OF ALL RESPONSES TO PHQ QUESTIONS 1-9: 0
SUM OF ALL RESPONSES TO PHQ QUESTIONS 1-9: 0
2. FEELING DOWN, DEPRESSED OR HOPELESS: 0
SUM OF ALL RESPONSES TO PHQ QUESTIONS 1-9: 0
SUM OF ALL RESPONSES TO PHQ QUESTIONS 1-9: 0

## 2024-01-08 NOTE — PROGRESS NOTES
Subjective:      Patient ID: Valeria Woodard is a 73 y.o. female.  Visit Information    Have you changed or started any medications since your last visit including any over-the-counter medicines, vitamins, or herbal medicines? no   Are you having any side effects from any of your medications? -  no  Have you stopped taking any of your medications? Is so, why? -  no    Have you seen any other physician or provider since your last visit? Yes - Records Obtained  Have you had any other diagnostic tests since your last visit? Yes - Records Obtained  Have you been seen in the emergency room and/or had an admission to a hospital since we last saw you? No  Have you had your routine dental cleaning in the past 6 months? yes -     Have you activated your [email or web address] account? If not, what are your barriers? Yes     Patient Care Team:  Marisabel Nelson APRN - NP as PCP - General (Family Nurse Practitioner)  Marisabel Nelson APRN - NP as PCP - Empaneled Provider  Nick Rivera MD as Consulting Physician (Gastroenterology)  Dionicio Munoz MD (Dermatology)  Jake Zepeda MD as Consulting Physician (Oral Maxillofacial Surgery)    Medical History Review  Past Medical, Family, and Social History reviewed and does contribute to the patient presenting condition    Health Maintenance   Topic Date Due    DTaP/Tdap/Td vaccine (1 - Tdap) Never done    COVID-19 Vaccine (8 - 2023-24 season) 09/01/2023    Lipids  01/25/2024    Depression Screen  10/23/2024    Annual Wellness Visit (Medicare)  10/25/2024    Breast cancer screen  11/06/2025    Colorectal Cancer Screen  02/09/2028    DEXA (modify frequency per FRAX score)  Completed    Flu vaccine  Completed    Shingles vaccine  Completed    Pneumococcal 65+ years Vaccine  Completed    Respiratory Syncytial Virus (RSV) Pregnant or age 60 yrs+  Completed    Hepatitis C screen  Completed    Hepatitis A vaccine  Aged Out    Hepatitis B vaccine  Aged Out    Hib vaccine  Aged Out    Polio vaccine

## 2024-01-10 LAB
MICROORGANISM SPEC CULT: ABNORMAL
SPECIMEN DESCRIPTION: ABNORMAL

## 2024-01-29 DIAGNOSIS — I25.10 ATHEROSCLEROSIS OF NATIVE CORONARY ARTERY WITHOUT ANGINA PECTORIS, UNSPECIFIED WHETHER NATIVE OR TRANSPLANTED HEART: ICD-10-CM

## 2024-01-29 RX ORDER — ROSUVASTATIN CALCIUM 5 MG/1
5 TABLET, COATED ORAL NIGHTLY
Qty: 90 TABLET | Refills: 1 | Status: SHIPPED | OUTPATIENT
Start: 2024-01-29

## 2024-01-29 NOTE — TELEPHONE ENCOUNTER
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    Patient was frantic and upset and I had to explain to her several times that Marisabel's license is fine and she is here working today.    Could you please send in refill to [facility] for patient.    Thank you.

## 2024-03-07 ENCOUNTER — OFFICE VISIT (OUTPATIENT)
Dept: FAMILY MEDICINE CLINIC | Age: 74
End: 2024-03-07

## 2024-03-07 VITALS
DIASTOLIC BLOOD PRESSURE: 62 MMHG | SYSTOLIC BLOOD PRESSURE: 118 MMHG | OXYGEN SATURATION: 93 % | BODY MASS INDEX: 31.93 KG/M2 | TEMPERATURE: 98.6 F | WEIGHT: 169 LBS | RESPIRATION RATE: 18 BRPM | HEART RATE: 67 BPM

## 2024-03-07 DIAGNOSIS — R09.89 SYMPTOMS OF UPPER RESPIRATORY INFECTION (URI): ICD-10-CM

## 2024-03-07 DIAGNOSIS — J10.1 INFLUENZA A: Primary | ICD-10-CM

## 2024-03-07 DIAGNOSIS — J43.2 CENTRILOBULAR EMPHYSEMA (HCC): ICD-10-CM

## 2024-03-07 LAB
INFLUENZA A ANTIGEN, POC: POSITIVE
INFLUENZA B ANTIGEN, POC: NEGATIVE
Lab: NORMAL
QC PASS/FAIL: NORMAL
SARS-COV-2 RDRP RESP QL NAA+PROBE: NEGATIVE
VALID INTERNAL CONTROL, POC: ABNORMAL

## 2024-03-07 RX ORDER — AZITHROMYCIN 250 MG/1
TABLET, FILM COATED ORAL
Qty: 6 TABLET | Refills: 0 | Status: SHIPPED | OUTPATIENT
Start: 2024-03-07 | End: 2024-03-17

## 2024-03-07 RX ORDER — PREDNISONE 20 MG/1
20 TABLET ORAL 2 TIMES DAILY
Qty: 10 TABLET | Refills: 0 | Status: SHIPPED | OUTPATIENT
Start: 2024-03-07 | End: 2024-03-12

## 2024-03-07 ASSESSMENT — ENCOUNTER SYMPTOMS
SHORTNESS OF BREATH: 1
HEMOPTYSIS: 0
HEARTBURN: 0
APNEA: 0
SORE THROAT: 0
WHEEZING: 1
CHEST TIGHTNESS: 0
STRIDOR: 0
CHOKING: 0
COUGH: 1
RHINORRHEA: 0

## 2024-03-07 NOTE — PROGRESS NOTES
[facility] WALK[facility]  [address]  [address]  [place] [address]  Dept: [phone]  Dept Fax: [phone]    Valeria Woodard is a 73 y.o. female who presents to the urgent care today for her medical conditions/complaints as notedbelow.  Valeria Woodard is c/o of Cough (X 3 days ), Shortness of Breath, and Congestion      HPI:     73 yr old female presents for cough, runny nose, sob with exertion 3 days  Hx emphysema, pneumonia, scleroderma  Had flu vaccine this season  Last inhaler use last night, does not use maint inhaler  No fevers    Cough  This is a new problem. The current episode started in the past 7 days (3d). The problem has been unchanged. The cough is Non-productive. Associated symptoms include chills, headaches, nasal congestion, postnasal drip, shortness of breath, sweats and wheezing. Pertinent negatives include no chest pain, ear congestion, ear pain, fever, heartburn, hemoptysis, myalgias, rash, rhinorrhea, sore throat or weight loss. Nothing aggravates the symptoms. She has tried a beta-agonist inhaler (nyquil, otc cld pills) for the symptoms. The treatment provided mild relief. Her past medical history is significant for emphysema, environmental allergies and pneumonia.       Past Medical History:   Diagnosis Date    Acute respiratory failure with hypoxia (HCC) 11/22/2020    Anxiety     Diverticulosis of colon     DJD (degenerative joint disease) 01/07/2015    Family history of colon cancer     Generalized OA 01/07/2015    GERD (gastroesophageal reflux disease)     Hyperlipidemia 01/07/2015    Irritable bowel syndrome     Osteoarthritis     Pneumonia due to COVID-19 virus 11/22/2020    Scleroderma ([facility])         Current Outpatient Medications   Medication Sig Dispense Refill    predniSONE (DELTASONE) 20 MG tablet Take 1 tablet by mouth 2 times daily for 5 days 10 tablet 0    azithromycin (ZITHROMAX) 250 MG tablet 500mg

## 2024-03-28 ENCOUNTER — TELEPHONE (OUTPATIENT)
Dept: FAMILY MEDICINE CLINIC | Age: 74
End: 2024-03-28

## 2024-03-28 DIAGNOSIS — F41.9 ANXIETY: ICD-10-CM

## 2024-03-28 RX ORDER — HYDROXYZINE HYDROCHLORIDE 25 MG/1
25 TABLET, FILM COATED ORAL DAILY PRN
Qty: 90 TABLET | Refills: 0 | Status: SHIPPED | OUTPATIENT
Start: 2024-03-28 | End: 2024-06-26

## 2024-03-28 NOTE — TELEPHONE ENCOUNTER
Patient is requesting refill on her Atarax 25 mg tablet, take 1 daily prn, #90 to [facility] [address].  She states she needs this medication again.

## 2024-04-26 ENCOUNTER — OFFICE VISIT (OUTPATIENT)
Dept: OBGYN CLINIC | Age: 74
End: 2024-04-26
Payer: MEDICARE

## 2024-04-26 VITALS
BODY MASS INDEX: 32.13 KG/M2 | WEIGHT: 170.2 LBS | SYSTOLIC BLOOD PRESSURE: 116 MMHG | HEIGHT: 61 IN | DIASTOLIC BLOOD PRESSURE: 71 MMHG

## 2024-04-26 DIAGNOSIS — Z12.31 ENCOUNTER FOR SCREENING MAMMOGRAM FOR BREAST CANCER: ICD-10-CM

## 2024-04-26 DIAGNOSIS — Z01.419 ENCOUNTER FOR GYNECOLOGICAL EXAMINATION: Primary | ICD-10-CM

## 2024-04-26 DIAGNOSIS — R10.2 PELVIC PAIN: ICD-10-CM

## 2024-04-26 PROCEDURE — G0101 CA SCREEN;PELVIC/BREAST EXAM: HCPCS | Performed by: NURSE PRACTITIONER

## 2024-04-26 PROCEDURE — 99213 OFFICE O/P EST LOW 20 MIN: CPT | Performed by: NURSE PRACTITIONER

## 2024-04-26 PROCEDURE — 1036F TOBACCO NON-USER: CPT | Performed by: NURSE PRACTITIONER

## 2024-04-26 PROCEDURE — G8427 DOCREV CUR MEDS BY ELIG CLIN: HCPCS | Performed by: NURSE PRACTITIONER

## 2024-04-26 PROCEDURE — G8399 PT W/DXA RESULTS DOCUMENT: HCPCS | Performed by: NURSE PRACTITIONER

## 2024-04-26 PROCEDURE — 1090F PRES/ABSN URINE INCON ASSESS: CPT | Performed by: NURSE PRACTITIONER

## 2024-04-26 PROCEDURE — G8417 CALC BMI ABV UP PARAM F/U: HCPCS | Performed by: NURSE PRACTITIONER

## 2024-04-26 PROCEDURE — 3017F COLORECTAL CA SCREEN DOC REV: CPT | Performed by: NURSE PRACTITIONER

## 2024-04-26 ASSESSMENT — ENCOUNTER SYMPTOMS
BACK PAIN: 0
ABDOMINAL DISTENTION: 0
ALLERGIC/IMMUNOLOGIC NEGATIVE: 1
SHORTNESS OF BREATH: 0
RESPIRATORY NEGATIVE: 1
GASTROINTESTINAL NEGATIVE: 1
COUGH: 0

## 2024-04-26 NOTE — PROGRESS NOTES
Chaperone for Intimate Exam  Chaperone was offered and accepted as part of the rooming process.  Chaperone: NONE       
examination  2. Encounter for screening mammogram for breast cancer  -     LENORA ANTONIETA DIGITAL SCREEN BILATERAL; Future  3. Pelvic pain  -     US PELVIS COMPLETE NON-OB TRANSABDOMINAL AND TRANSVAGINAL; Future              Hereditary Breast, Ovarian,Colon and Uterine Cancer screening Done.          Tobacco & Secondary smoke risks reviewed; instructed oncessation and avoidance      - Pap not collected per ASCCP guidelines.  -Menopause symptoms discussed   - Screening mammogram discussed and advised yearly if normal starting at age 40.  - Calcium and Vitamin D dosing reviewed.  - Colonoscopy screening reviewed.   -General diet and exercise reviewed.   - Routine health maintenance per patients PCP.    Return in about 2 years (around 4/26/2026) for annual exam and prn.    Electronically signed by DREW Lewis CNP on 4/26/2024at 1:36 PM

## 2024-05-05 SDOH — ECONOMIC STABILITY: FOOD INSECURITY: WITHIN THE PAST 12 MONTHS, THE FOOD YOU BOUGHT JUST DIDN'T LAST AND YOU DIDN'T HAVE MONEY TO GET MORE.: NEVER TRUE

## 2024-05-05 SDOH — ECONOMIC STABILITY: FOOD INSECURITY: WITHIN THE PAST 12 MONTHS, YOU WORRIED THAT YOUR FOOD WOULD RUN OUT BEFORE YOU GOT MONEY TO BUY MORE.: NEVER TRUE

## 2024-05-05 SDOH — ECONOMIC STABILITY: INCOME INSECURITY: HOW HARD IS IT FOR YOU TO PAY FOR THE VERY BASICS LIKE FOOD, HOUSING, MEDICAL CARE, AND HEATING?: NOT HARD AT ALL

## 2024-05-05 SDOH — ECONOMIC STABILITY: HOUSING INSECURITY
IN THE LAST 12 MONTHS, WAS THERE A TIME WHEN YOU DID NOT HAVE A STEADY PLACE TO SLEEP OR SLEPT IN A SHELTER (INCLUDING NOW)?: NO

## 2024-05-08 ENCOUNTER — HOSPITAL ENCOUNTER (OUTPATIENT)
Dept: ULTRASOUND IMAGING | Age: 74
Discharge: HOME | End: 2024-05-10
Payer: MEDICARE

## 2024-05-08 ENCOUNTER — OFFICE VISIT (OUTPATIENT)
Dept: FAMILY MEDICINE CLINIC | Age: 74
End: 2024-05-08

## 2024-05-08 VITALS
DIASTOLIC BLOOD PRESSURE: 66 MMHG | TEMPERATURE: 98.2 F | SYSTOLIC BLOOD PRESSURE: 122 MMHG | WEIGHT: 168 LBS | OXYGEN SATURATION: 93 % | HEART RATE: 60 BPM | RESPIRATION RATE: 20 BRPM | BODY MASS INDEX: 31.74 KG/M2

## 2024-05-08 DIAGNOSIS — F40.240 CLAUSTROPHOBIA: ICD-10-CM

## 2024-05-08 DIAGNOSIS — F41.9 ANXIETY: ICD-10-CM

## 2024-05-08 DIAGNOSIS — Z23 NEED FOR PNEUMOCOCCAL 20-VALENT CONJUGATE VACCINATION: ICD-10-CM

## 2024-05-08 DIAGNOSIS — R10.2 PELVIC PAIN: ICD-10-CM

## 2024-05-08 DIAGNOSIS — R31.9 HEMATURIA, UNSPECIFIED TYPE: ICD-10-CM

## 2024-05-08 DIAGNOSIS — E78.2 MIXED HYPERLIPIDEMIA: Primary | ICD-10-CM

## 2024-05-08 DIAGNOSIS — R80.9 MICROALBUMINURIA: ICD-10-CM

## 2024-05-08 PROBLEM — D72.829 ELEVATED WHITE BLOOD CELL COUNT, UNSPECIFIED: Status: RESOLVED | Noted: 2023-10-24 | Resolved: 2024-05-08

## 2024-05-08 PROCEDURE — 76856 US EXAM PELVIC COMPLETE: CPT

## 2024-05-08 ASSESSMENT — PATIENT HEALTH QUESTIONNAIRE - PHQ9
SUM OF ALL RESPONSES TO PHQ QUESTIONS 1-9: 0
SUM OF ALL RESPONSES TO PHQ QUESTIONS 1-9: 0
SUM OF ALL RESPONSES TO PHQ9 QUESTIONS 1 & 2: 0
2. FEELING DOWN, DEPRESSED OR HOPELESS: NOT AT ALL
1. LITTLE INTEREST OR PLEASURE IN DOING THINGS: NOT AT ALL
SUM OF ALL RESPONSES TO PHQ QUESTIONS 1-9: 0
SUM OF ALL RESPONSES TO PHQ QUESTIONS 1-9: 0

## 2024-05-08 ASSESSMENT — ENCOUNTER SYMPTOMS
ABDOMINAL PAIN: 0
NAUSEA: 0
WHEEZING: 0
VOMITING: 0
SHORTNESS OF BREATH: 0

## 2024-05-08 NOTE — PROGRESS NOTES
Negative for dysuria.   Neurological:  Negative for dizziness, weakness and numbness.   Psychiatric/Behavioral:  Positive for dysphoric mood. Negative for agitation, behavioral problems and suicidal ideas.           Objective   Physical Exam  Vitals and nursing note reviewed.   Constitutional:       General: She is not in acute distress.     Appearance: Normal appearance. She is obese.   HENT:      Head: Normocephalic and atraumatic.   Eyes:      Extraocular Movements: Extraocular movements intact.      Conjunctiva/sclera: Conjunctivae normal.   Cardiovascular:      Rate and Rhythm: Regular rhythm.      Heart sounds: Normal heart sounds.   Pulmonary:      Effort: Pulmonary effort is normal. No respiratory distress.      Breath sounds: No wheezing.   Abdominal:      Palpations: Abdomen is soft.      Tenderness: There is no abdominal tenderness.   Musculoskeletal:         General: No tenderness. Normal range of motion.      Cervical back: Neck supple.   Lymphadenopathy:      Cervical: No cervical adenopathy.   Skin:     General: Skin is warm and dry.   Neurological:      Mental Status: She is oriented to person, place, and time.      Cranial Nerves: No cranial nerve deficit.   Psychiatric:         Behavior: Behavior normal.         Thought Content: Thought content normal.      Comments: Appears anxious             An electronic signature was used to authenticate this note.

## 2024-05-24 ENCOUNTER — OFFICE VISIT (OUTPATIENT)
Dept: FAMILY MEDICINE CLINIC | Age: 74
End: 2024-05-24

## 2024-05-24 VITALS
TEMPERATURE: 98.3 F | RESPIRATION RATE: 20 BRPM | WEIGHT: 165.6 LBS | SYSTOLIC BLOOD PRESSURE: 112 MMHG | BODY MASS INDEX: 31.29 KG/M2 | OXYGEN SATURATION: 94 % | DIASTOLIC BLOOD PRESSURE: 68 MMHG | HEART RATE: 74 BPM

## 2024-05-24 DIAGNOSIS — F43.0 STRESS REACTION: ICD-10-CM

## 2024-05-24 DIAGNOSIS — R11.0 NAUSEA: Primary | ICD-10-CM

## 2024-05-24 RX ORDER — ONDANSETRON 4 MG/1
4 TABLET, ORALLY DISINTEGRATING ORAL ONCE
Status: COMPLETED | OUTPATIENT
Start: 2024-05-24 | End: 2024-05-24

## 2024-05-24 RX ADMIN — ONDANSETRON 4 MG: 4 TABLET, ORALLY DISINTEGRATING ORAL at 09:52

## 2024-05-24 ASSESSMENT — ENCOUNTER SYMPTOMS
ABDOMINAL PAIN: 0
ABDOMINAL DISTENTION: 0
DIARRHEA: 0
NAUSEA: 1
VOMITING: 1
BACK PAIN: 0

## 2024-05-24 NOTE — PROGRESS NOTES
x10^3ul    Lymphocytes Absolute 2.73 (0.96  - 5.40) x10^3ul    Monocytes Absolute 0.91 (0.10  - 1.00) x10^3ul    Eosinophils Absolute 0.17 (0.00  - 0.40) x10^3ul    Basophils Absolute 0.02 (0.00  - 0.16) x10^3ul    IMMATURE GRANULOCYTES ABSOLUTE 0.09 (0.00  - 0.11) x10^3ul   Urinalysis   Result Value Ref Range    Color, UA YEL (P YEL - L AMB)    Character, Urine HAZY (CLEAR - HAZY)    Specific Gravity, UA 1.019 (1.001 - 1.035)    pH, Urine 5.5 (5.0   - 9.0)    Albumin, U 30 (H) (NONE  - NEG) MG/DL    Glucose, Urine NEG (NEG) MG/DL    Ketones, Urine NEG (NEG) MG/DL    Bilirubin, Urine NEG (NEG)    Urobilinogen, Urine 0.2 (0.2   - <2.0) MG/DL    Occult Blood,Urine SMALL (H) (NEG)    Nitrite, Urine NEG (NEG)    Leukocyte Esterase, Urine TRACE (H) (NEG)    WBC, UA 0-4 (0     - 4) /HPF    RBC, UA 3-5 (H) (0     - 2) /HPF    Squamous Epithelial Cells OCC (NONE  - OCC) /HPF    BACTERIA, URINE NONE (NONE  - OCC) /HPF    MUCUS, URINE MOD (H) (NONE  - OCC) /HPF   Basic Metabolic Panel   Result Value Ref Range    Glucose 123 (H) (74    - 106) MG/DL    BUN 10 (7     - 17) MG/DL    Creatinine 0.79 (0.52  - 1.04) MG/DL    Sodium 143 (137   - 145) MMOL/L    Potassium 3.8 (3.5   - 5.1) MMOL/L    Chloride 108 (H) (98    - 107) MMOL/L    CO2 28 (22    - 30) MMOL/L    Calcium 9.0 (8.6   - 10.6) MG/DL    GFR Non- 71.5 (60.0  - 115.8) ML/M1.7    GFR  86.6 (60.0  - 140.1) ML/M1.7   Microalbumin / Creatinine Urine Ratio   Result Value Ref Range    Urine Collection RANDOM (AS    - NOTED)    Creatinine, Ur 176.7 () MG/DL    Microalb, Ur 3.17 (H) (0.0   - 1.70) MG/DL    Microalb/Creat Ratio 17.9 (0.0   - 30.0) MG/G CR   Vitamin D 25 Hydroxy   Result Value Ref Range    Vit D, 25-Hydroxy 50.1 (30.0  - 100.0) NG/ML   Albumin   Result Value Ref Range    Albumin 4.0 (3.5   - 5.0) G/DL   Magnesium   Result Value Ref Range    Magnesium 2.1 (1.6   - 2.3) MG/DL     Pt presented to the clinic today with c/o nausea. This

## 2024-06-30 DIAGNOSIS — F41.9 ANXIETY: ICD-10-CM

## 2024-07-01 RX ORDER — HYDROXYZINE HYDROCHLORIDE 25 MG/1
25 TABLET, FILM COATED ORAL DAILY PRN
Qty: 90 TABLET | Refills: 0 | Status: SHIPPED | OUTPATIENT
Start: 2024-07-01

## 2024-07-11 DIAGNOSIS — I25.10 ATHEROSCLEROSIS OF NATIVE CORONARY ARTERY WITHOUT ANGINA PECTORIS, UNSPECIFIED WHETHER NATIVE OR TRANSPLANTED HEART: ICD-10-CM

## 2024-07-11 RX ORDER — ROSUVASTATIN CALCIUM 5 MG/1
5 TABLET, COATED ORAL NIGHTLY
Qty: 90 TABLET | Refills: 1 | Status: SHIPPED | OUTPATIENT
Start: 2024-07-11

## 2024-09-05 ENCOUNTER — HOSPITAL ENCOUNTER (OUTPATIENT)
Age: 74
Setting detail: SPECIMEN
Discharge: HOME | End: 2024-09-05

## 2024-09-05 DIAGNOSIS — E78.2 MIXED HYPERLIPIDEMIA: ICD-10-CM

## 2024-09-05 LAB
ALT SERPL-CCNC: 17 U/L (ref 10–35)
AST SERPL-CCNC: 25 U/L (ref 10–35)
CHOLEST SERPL-MCNC: 104 MG/DL (ref 0–199)
CHOLESTEROL/HDL RATIO: 2
HDLC SERPL-MCNC: 54 MG/DL
LDLC SERPL CALC-MCNC: 36 MG/DL (ref 0–100)
TRIGL SERPL-MCNC: 71 MG/DL
VLDLC SERPL CALC-MCNC: 14 MG/DL

## 2024-09-24 ENCOUNTER — TELEPHONE (OUTPATIENT)
Dept: FAMILY MEDICINE CLINIC | Age: 74
End: 2024-09-24

## 2024-09-27 DIAGNOSIS — F41.9 ANXIETY: ICD-10-CM

## 2024-09-27 RX ORDER — HYDROXYZINE HYDROCHLORIDE 25 MG/1
25 TABLET, FILM COATED ORAL DAILY PRN
Qty: 90 TABLET | Refills: 0 | Status: SHIPPED | OUTPATIENT
Start: 2024-09-27

## 2024-11-04 ENCOUNTER — OFFICE VISIT (OUTPATIENT)
Dept: FAMILY MEDICINE CLINIC | Age: 74
End: 2024-11-04

## 2024-11-04 VITALS
DIASTOLIC BLOOD PRESSURE: 70 MMHG | BODY MASS INDEX: 32.12 KG/M2 | SYSTOLIC BLOOD PRESSURE: 116 MMHG | HEART RATE: 64 BPM | TEMPERATURE: 98.2 F | WEIGHT: 170 LBS | OXYGEN SATURATION: 94 % | RESPIRATION RATE: 14 BRPM

## 2024-11-04 DIAGNOSIS — M17.0 ARTHRITIS OF BOTH KNEES: ICD-10-CM

## 2024-11-04 DIAGNOSIS — Z78.0 POSTMENOPAUSE: ICD-10-CM

## 2024-11-04 DIAGNOSIS — K21.9 GASTROESOPHAGEAL REFLUX DISEASE WITHOUT ESOPHAGITIS: Primary | ICD-10-CM

## 2024-11-04 DIAGNOSIS — R29.898 BILATERAL LEG WEAKNESS: ICD-10-CM

## 2024-11-04 DIAGNOSIS — E78.2 MIXED HYPERLIPIDEMIA: Primary | ICD-10-CM

## 2024-11-04 DIAGNOSIS — K21.9 GASTROESOPHAGEAL REFLUX DISEASE WITHOUT ESOPHAGITIS: ICD-10-CM

## 2024-11-04 DIAGNOSIS — F41.9 ANXIETY: ICD-10-CM

## 2024-11-04 RX ORDER — PREDNISONE 5 MG/1
5 TABLET ORAL DAILY
COMMUNITY

## 2024-11-04 ASSESSMENT — PATIENT HEALTH QUESTIONNAIRE - PHQ9
1. LITTLE INTEREST OR PLEASURE IN DOING THINGS: NOT AT ALL
SUM OF ALL RESPONSES TO PHQ QUESTIONS 1-9: 1
SUM OF ALL RESPONSES TO PHQ9 QUESTIONS 1 & 2: 1
SUM OF ALL RESPONSES TO PHQ QUESTIONS 1-9: 1
2. FEELING DOWN, DEPRESSED OR HOPELESS: SEVERAL DAYS

## 2024-11-04 NOTE — PROGRESS NOTES
Valeria Woodard (:  1950) is a 74 y.o. female,Established patient, here for evaluation of the following chief complaint(s):  Gastroesophageal Reflux (6 month F/U), Anxiety, Cholesterol Problem, Health Maintenance (AWV), Leg Pain (Right, after a fall. Hx of right hip replacement. Ortho stated that nothing is wrong.  Rheumatology  states that the right hip is inflamed, wants her to go to Neurology regarding the pain. On prednisone and the pain is gone.), and Sinusitis (With slight left ear pain)         Assessment & Plan  Mixed hyperlipidemia   Chronic, at goal (stable), continue statin therapy  and lifestyle modifications recommended         Anxiety   Chronic, at goal (stable), continue current treatment plan         Gastroesophageal reflux disease without esophagitis   Chronic, not at goal (unstable), continue PPI therapy   Refer pt to GI for further management        Arthritis of both knees   Chronic, not at goal (unstable), continue current treatment plan  Follow up with rheumatologist as scheduled       Bilateral leg weakness   Chronic, not at goal (unstable), follow up with neurologist as scheduled          Postmenopause    Orders:    DEXA BONE DENSITY AXIAL SKELETON; Future      No follow-ups on file.       Subjective   Visit Information    Have you changed or started any medications since your last visit including any over-the-counter medicines, vitamins, or herbal medicines? yes - prednisone   Are you having any side effects from any of your medications? -  no  Have you stopped taking any of your medications? Is so, why? -  no    Have you seen any other physician or provider since your last visit? Yes - Records Obtained  Have you had any other diagnostic tests since your last visit? Yes - Records Obtained  Have you been seen in the emergency room and/or had an admission to a hospital since we last saw you? No  Have you had your routine dental cleaning in the past 6 months? no    Have you activated

## 2024-11-04 NOTE — ASSESSMENT & PLAN NOTE
Chronic, not at goal (unstable), continue PPI therapy   Refer pt to GI for further management

## 2024-11-06 PROBLEM — M17.0 ARTHRITIS OF BOTH KNEES: Status: ACTIVE | Noted: 2024-11-06

## 2024-11-06 PROBLEM — R29.898 BILATERAL LEG WEAKNESS: Status: ACTIVE | Noted: 2024-11-06

## 2024-11-06 ASSESSMENT — ENCOUNTER SYMPTOMS
BLOOD IN STOOL: 0
ABDOMINAL PAIN: 0
CHEST TIGHTNESS: 0
SHORTNESS OF BREATH: 0

## 2024-11-06 NOTE — ASSESSMENT & PLAN NOTE
Chronic, at goal (stable), continue statin therapy  and lifestyle modifications recommended

## 2024-11-07 ENCOUNTER — HOSPITAL ENCOUNTER (OUTPATIENT)
Dept: MAMMOGRAPHY | Age: 74
Discharge: HOME | End: 2024-11-09
Payer: MEDICARE

## 2024-11-07 VITALS — WEIGHT: 168 LBS | HEIGHT: 62 IN | BODY MASS INDEX: 30.91 KG/M2

## 2024-11-07 DIAGNOSIS — Z12.31 ENCOUNTER FOR SCREENING MAMMOGRAM FOR BREAST CANCER: ICD-10-CM

## 2024-11-07 PROCEDURE — 77063 BREAST TOMOSYNTHESIS BI: CPT

## 2024-11-07 PROCEDURE — 77080 DXA BONE DENSITY AXIAL: CPT

## 2024-11-11 ENCOUNTER — TELEPHONE (OUTPATIENT)
Dept: GASTROENTEROLOGY | Age: 74
End: 2024-11-11

## 2024-11-15 ENCOUNTER — TELEPHONE (OUTPATIENT)
Dept: GASTROENTEROLOGY | Age: 74
End: 2024-11-15

## 2024-11-22 ENCOUNTER — HOSPITAL ENCOUNTER (OUTPATIENT)
Dept: MAMMOGRAPHY | Age: 74
Discharge: HOME | End: 2024-11-24

## 2024-11-22 DIAGNOSIS — Z78.0 POSTMENOPAUSE: ICD-10-CM

## 2024-11-29 ENCOUNTER — TELEPHONE (OUTPATIENT)
Dept: FAMILY MEDICINE CLINIC | Age: 74
End: 2024-11-29

## 2024-11-29 RX ORDER — BENZONATATE 100 MG/1
CAPSULE ORAL
Qty: 30 CAPSULE | Refills: 1 | Status: SHIPPED | OUTPATIENT
Start: 2024-11-29

## 2024-11-29 NOTE — TELEPHONE ENCOUNTER
Patient is requesting a refill on Tessalon perles be sent to [facility] on [address].

## 2024-12-23 DIAGNOSIS — F41.9 ANXIETY: ICD-10-CM

## 2024-12-23 RX ORDER — HYDROXYZINE HYDROCHLORIDE 25 MG/1
25 TABLET, FILM COATED ORAL DAILY PRN
Qty: 90 TABLET | Refills: 0 | Status: SHIPPED | OUTPATIENT
Start: 2024-12-23

## 2024-12-27 DIAGNOSIS — F41.9 ANXIETY: ICD-10-CM

## 2024-12-27 RX ORDER — HYDROXYZINE HYDROCHLORIDE 25 MG/1
25 TABLET, FILM COATED ORAL DAILY PRN
Qty: 90 TABLET | Refills: 0 | OUTPATIENT
Start: 2024-12-27

## 2024-12-31 DIAGNOSIS — I25.10 ATHEROSCLEROSIS OF NATIVE CORONARY ARTERY WITHOUT ANGINA PECTORIS, UNSPECIFIED WHETHER NATIVE OR TRANSPLANTED HEART: ICD-10-CM

## 2024-12-31 RX ORDER — ROSUVASTATIN CALCIUM 5 MG/1
5 TABLET, COATED ORAL NIGHTLY
Qty: 90 TABLET | Refills: 1 | Status: SHIPPED | OUTPATIENT
Start: 2024-12-31

## 2025-01-07 ENCOUNTER — HOSPITAL ENCOUNTER (OUTPATIENT)
Dept: CT IMAGING | Age: 75
Discharge: HOME | End: 2025-01-09
Attending: INTERNAL MEDICINE
Payer: MEDICARE

## 2025-01-07 ENCOUNTER — HOSPITAL ENCOUNTER (OUTPATIENT)
Dept: PULMONOLOGY | Age: 75
Discharge: HOME | End: 2025-01-07
Attending: INTERNAL MEDICINE
Payer: MEDICARE

## 2025-01-07 DIAGNOSIS — M34.9 SCLERODERMA (HCC): ICD-10-CM

## 2025-01-07 PROCEDURE — 94726 PLETHYSMOGRAPHY LUNG VOLUMES: CPT

## 2025-01-07 PROCEDURE — 94729 DIFFUSING CAPACITY: CPT

## 2025-01-07 PROCEDURE — 71250 CT THORAX DX C-: CPT

## 2025-01-07 PROCEDURE — 94060 EVALUATION OF WHEEZING: CPT

## 2025-01-07 RX ORDER — ALBUTEROL SULFATE 90 UG/1
2 INHALANT RESPIRATORY (INHALATION) ONCE
Status: DISCONTINUED | OUTPATIENT
Start: 2025-01-07 | End: 2025-01-08 | Stop reason: HOSPADM

## 2025-01-07 NOTE — PROCEDURES
Impression     Minimal obstructive pulmonary disease with normal diffusion, mild air trapping and no significant response to bronchodilators.  Clinical correlation is recommended.        Rikki Canales MD  Pulmonary critical care and sleep medicinefinal.

## 2025-01-18 RX ORDER — PANTOPRAZOLE SODIUM 40 MG/1
40 TABLET, DELAYED RELEASE ORAL DAILY
Qty: 90 TABLET | Refills: 3 | OUTPATIENT
Start: 2025-01-18

## 2025-01-23 ENCOUNTER — PREP FOR PROCEDURE (OUTPATIENT)
Dept: GASTROENTEROLOGY | Age: 75
End: 2025-01-23

## 2025-01-23 ENCOUNTER — OFFICE VISIT (OUTPATIENT)
Dept: GASTROENTEROLOGY | Age: 75
End: 2025-01-23
Payer: MEDICARE

## 2025-01-23 VITALS
DIASTOLIC BLOOD PRESSURE: 66 MMHG | OXYGEN SATURATION: 96 % | SYSTOLIC BLOOD PRESSURE: 141 MMHG | WEIGHT: 175 LBS | HEIGHT: 62 IN | RESPIRATION RATE: 16 BRPM | BODY MASS INDEX: 32.2 KG/M2 | HEART RATE: 56 BPM

## 2025-01-23 DIAGNOSIS — R19.4 CHANGE IN BOWEL HABIT: ICD-10-CM

## 2025-01-23 DIAGNOSIS — K21.9 GASTROESOPHAGEAL REFLUX DISEASE, UNSPECIFIED WHETHER ESOPHAGITIS PRESENT: Primary | ICD-10-CM

## 2025-01-23 DIAGNOSIS — Z80.0 FAMILY HX OF COLON CANCER: ICD-10-CM

## 2025-01-23 DIAGNOSIS — R19.4 CHANGE IN BOWEL HABITS: ICD-10-CM

## 2025-01-23 DIAGNOSIS — Z80.0 FAMILY HISTORY OF COLON CANCER: ICD-10-CM

## 2025-01-23 PROCEDURE — G8399 PT W/DXA RESULTS DOCUMENT: HCPCS | Performed by: STUDENT IN AN ORGANIZED HEALTH CARE EDUCATION/TRAINING PROGRAM

## 2025-01-23 PROCEDURE — 1036F TOBACCO NON-USER: CPT | Performed by: STUDENT IN AN ORGANIZED HEALTH CARE EDUCATION/TRAINING PROGRAM

## 2025-01-23 PROCEDURE — 1126F AMNT PAIN NOTED NONE PRSNT: CPT | Performed by: STUDENT IN AN ORGANIZED HEALTH CARE EDUCATION/TRAINING PROGRAM

## 2025-01-23 PROCEDURE — 3017F COLORECTAL CA SCREEN DOC REV: CPT | Performed by: STUDENT IN AN ORGANIZED HEALTH CARE EDUCATION/TRAINING PROGRAM

## 2025-01-23 PROCEDURE — 99214 OFFICE O/P EST MOD 30 MIN: CPT | Performed by: STUDENT IN AN ORGANIZED HEALTH CARE EDUCATION/TRAINING PROGRAM

## 2025-01-23 PROCEDURE — 1123F ACP DISCUSS/DSCN MKR DOCD: CPT | Performed by: STUDENT IN AN ORGANIZED HEALTH CARE EDUCATION/TRAINING PROGRAM

## 2025-01-23 PROCEDURE — G8427 DOCREV CUR MEDS BY ELIG CLIN: HCPCS | Performed by: STUDENT IN AN ORGANIZED HEALTH CARE EDUCATION/TRAINING PROGRAM

## 2025-01-23 PROCEDURE — 1159F MED LIST DOCD IN RCRD: CPT | Performed by: STUDENT IN AN ORGANIZED HEALTH CARE EDUCATION/TRAINING PROGRAM

## 2025-01-23 PROCEDURE — 1090F PRES/ABSN URINE INCON ASSESS: CPT | Performed by: STUDENT IN AN ORGANIZED HEALTH CARE EDUCATION/TRAINING PROGRAM

## 2025-01-23 PROCEDURE — G8417 CALC BMI ABV UP PARAM F/U: HCPCS | Performed by: STUDENT IN AN ORGANIZED HEALTH CARE EDUCATION/TRAINING PROGRAM

## 2025-01-23 RX ORDER — PANTOPRAZOLE SODIUM 40 MG/1
40 TABLET, DELAYED RELEASE ORAL
Qty: 90 TABLET | Refills: 3 | Status: SHIPPED | OUTPATIENT
Start: 2025-01-23

## 2025-01-23 NOTE — PROGRESS NOTES
Reason for Referral:     No referring provider defined for this encounter.    Chief Complaint   Patient presents with    Follow-up     Former [place], pt  Hx gerd     Other     Pt has hx of Scleroderma and hiatal hernia        1. Gastroesophageal reflux disease, unspecified whether esophagitis present    2. Family history of colon cancer    3. Change in bowel habit        HISTORY OF PRESENT ILLNESS: Ms.Sharon MAREK Woodard is a 74 y.o. female with a past history remarkable for GERD, IBS, scleroderma, follows up for evaluation of GERD.    1/23/25: Patient has crest.  She has been having problem with reflux for many years that she requires 1 Protonix every day and if she gets off of it she has bad rebound reflux.  This visit is mainly to establish care for Protonix refill.  She also has history of colorectal cancer and she is overdue for it.  No bleeding, no constipation or diarrhea.    1/2023: On Protonix every other day for her GERD. She has scleroderma and she has to refill the protonix    [facility]  Father age of 65 and grandpa had colon cancer    Previous Endoscopies  EGD 2/2023  Normal    Colonoscopy 2/2018  Repeat 10 years    Previous GI workup     Patient's PMH/PSH,SH,PSYCH Hx, MEDs, ALLERGIES, and ROS were all reviewed and updated in the appropriate sections.  I did review all the labs results available for the labs which were ordered by the primary care physician, and the other consultants, we search on [facility] at [facility] and all the available care everywhere epic  I did review all the imaging studies of the abdomen available on EMR, ordered by the primary care physician and the other consultant  I did review all the pathology from the biopsies done on the previous endoscopies    PAST MEDICAL HISTORY:  Past Medical History:   Diagnosis Date    Acute respiratory failure with hypoxia 11/22/2020    Anxiety     Diverticulosis of colon     DJD (degenerative joint disease) 01/07/2015    Family history of colon cancer

## 2025-02-03 ENCOUNTER — HOSPITAL ENCOUNTER (OUTPATIENT)
Dept: PREADMISSION TESTING | Age: 75
Discharge: HOME | End: 2025-02-07

## 2025-02-03 VITALS — WEIGHT: 170 LBS | BODY MASS INDEX: 31.28 KG/M2 | HEIGHT: 62 IN

## 2025-02-03 NOTE — PROGRESS NOTES
Pre-op Instructions For Out-Patient Endoscopy Surgery    Medication Instructions:  Please stop herbs and any supplements now (includes vitamins and minerals).    For these medications:  Dulaglutide (Trulicity), Exenatide (Byetta and Bydureon, Liraglutide (Victoza), Lixisenatide (Adlyxin), Semaglutide (Ozempic and Rybelsus), Tirzepatide (Mounjaro, Zepbound)- Stop 1 week prior if taking weekly or 1 day prior if taking every 12 hours or daily.     Please contact your surgeon and prescribing physician for pre-op instructions for any blood thinners. Stop taking Aspirin as directed    If you have inhalers/aerosol treatments at home, please use them the morning of your surgery and bring the inhalers with you to the hospital.    Please take the following medications the morning of your surgery with a sip of water:    Inhaler    Surgery Instructions:  After midnight before surgery:  Do not eat or drink anything, including water, mints, gum, and hard candy.  You may brush your teeth without swallowing.  No smoking, chewing tobacco, or street drugs.     ** Please Follow Bowel Prep instructions if given by surgeon's office**    Please shower or bathe before surgery.       Please do not wear any cologne, lotion, powder, jewelry, piercings, perfume, makeup, nail polish, hair accessories, or hair spray on the day of surgery.  Wear loose comfortable clothing.    Leave your valuables at home but bring a payment source for any after-surgery prescriptions you plan to fill at [facility].  Bring a storage case for any glasses/contacts.    An adult who is responsible for you MUST drive you home and should be with you for the first 24 hours after surgery.     The Day of Surgery:  Arrive at [facility] Surgery Entrance at the time directed by your surgeon and check in at the desk.     If you have a living will or healthcare power of , please bring a copy.    You will be taken to the pre-op holding area

## 2025-02-07 NOTE — PRE-PROCEDURE INSTRUCTIONS
No answer, left message ?                             Unable to leave message ?    When were you told to arrive at hospital ? -0730     Do you have a  ?-YES    Are you on any blood thinners ? -ASA                    If yes when did you stop taking ?-1 WEEK AGO    Do you have your prep Rx filled and instruction ?  -YES    Nothing to eat the day before , only clear liquids.-INSTRUCTED    Are you experiencing any covid symptoms ? -NONE    Do you have any infections or rash we should be aware of ?-NONE      Do you have the Hibiclens soap to use the night before and the morning of surgery ?-N/A    Nothing to eat or drink after midnight, only a sip of water to take any medication instructed to take the night before.-INSTRUCTED    Wear comfortable clothing, leave any valuables at home, remove any jewelry and body piercing .-INSTRUCTED

## 2025-03-03 SDOH — ECONOMIC STABILITY: INCOME INSECURITY: IN THE LAST 12 MONTHS, WAS THERE A TIME WHEN YOU WERE NOT ABLE TO PAY THE MORTGAGE OR RENT ON TIME?: NO

## 2025-03-03 SDOH — ECONOMIC STABILITY: FOOD INSECURITY: WITHIN THE PAST 12 MONTHS, THE FOOD YOU BOUGHT JUST DIDN'T LAST AND YOU DIDN'T HAVE MONEY TO GET MORE.: NEVER TRUE

## 2025-03-03 SDOH — ECONOMIC STABILITY: FOOD INSECURITY: WITHIN THE PAST 12 MONTHS, YOU WORRIED THAT YOUR FOOD WOULD RUN OUT BEFORE YOU GOT MONEY TO BUY MORE.: NEVER TRUE

## 2025-03-03 SDOH — ECONOMIC STABILITY: TRANSPORTATION INSECURITY
IN THE PAST 12 MONTHS, HAS THE LACK OF TRANSPORTATION KEPT YOU FROM MEDICAL APPOINTMENTS OR FROM GETTING MEDICATIONS?: NO

## 2025-03-03 ASSESSMENT — PATIENT HEALTH QUESTIONNAIRE - PHQ9
SUM OF ALL RESPONSES TO PHQ QUESTIONS 1-9: 1
SUM OF ALL RESPONSES TO PHQ QUESTIONS 1-9: 1
1. LITTLE INTEREST OR PLEASURE IN DOING THINGS: SEVERAL DAYS
SUM OF ALL RESPONSES TO PHQ QUESTIONS 1-9: 1
2. FEELING DOWN, DEPRESSED OR HOPELESS: NOT AT ALL
SUM OF ALL RESPONSES TO PHQ9 QUESTIONS 1 & 2: 1
SUM OF ALL RESPONSES TO PHQ QUESTIONS 1-9: 1
1. LITTLE INTEREST OR PLEASURE IN DOING THINGS: SEVERAL DAYS
2. FEELING DOWN, DEPRESSED OR HOPELESS: NOT AT ALL

## 2025-03-06 ENCOUNTER — OFFICE VISIT (OUTPATIENT)
Dept: FAMILY MEDICINE CLINIC | Age: 75
End: 2025-03-06
Payer: MEDICARE

## 2025-03-06 VITALS
SYSTOLIC BLOOD PRESSURE: 130 MMHG | RESPIRATION RATE: 16 BRPM | BODY MASS INDEX: 31.64 KG/M2 | DIASTOLIC BLOOD PRESSURE: 70 MMHG | WEIGHT: 173 LBS | HEART RATE: 56 BPM | TEMPERATURE: 98.1 F | OXYGEN SATURATION: 98 %

## 2025-03-06 DIAGNOSIS — F41.9 ANXIETY: ICD-10-CM

## 2025-03-06 DIAGNOSIS — J43.2 CENTRILOBULAR EMPHYSEMA (HCC): ICD-10-CM

## 2025-03-06 DIAGNOSIS — E78.2 MIXED HYPERLIPIDEMIA: Primary | ICD-10-CM

## 2025-03-06 DIAGNOSIS — K21.9 GASTROESOPHAGEAL REFLUX DISEASE WITHOUT ESOPHAGITIS: ICD-10-CM

## 2025-03-06 PROCEDURE — 1090F PRES/ABSN URINE INCON ASSESS: CPT | Performed by: NURSE PRACTITIONER

## 2025-03-06 PROCEDURE — 3023F SPIROM DOC REV: CPT | Performed by: NURSE PRACTITIONER

## 2025-03-06 PROCEDURE — 1160F RVW MEDS BY RX/DR IN RCRD: CPT | Performed by: NURSE PRACTITIONER

## 2025-03-06 PROCEDURE — 1123F ACP DISCUSS/DSCN MKR DOCD: CPT | Performed by: NURSE PRACTITIONER

## 2025-03-06 PROCEDURE — G8399 PT W/DXA RESULTS DOCUMENT: HCPCS | Performed by: NURSE PRACTITIONER

## 2025-03-06 PROCEDURE — 3017F COLORECTAL CA SCREEN DOC REV: CPT | Performed by: NURSE PRACTITIONER

## 2025-03-06 PROCEDURE — 1159F MED LIST DOCD IN RCRD: CPT | Performed by: NURSE PRACTITIONER

## 2025-03-06 PROCEDURE — G8417 CALC BMI ABV UP PARAM F/U: HCPCS | Performed by: NURSE PRACTITIONER

## 2025-03-06 PROCEDURE — 99214 OFFICE O/P EST MOD 30 MIN: CPT | Performed by: NURSE PRACTITIONER

## 2025-03-06 PROCEDURE — 1036F TOBACCO NON-USER: CPT | Performed by: NURSE PRACTITIONER

## 2025-03-06 PROCEDURE — G8427 DOCREV CUR MEDS BY ELIG CLIN: HCPCS | Performed by: NURSE PRACTITIONER

## 2025-03-06 RX ORDER — FLUOROURACIL 50 MG/G
CREAM TOPICAL 2 TIMES DAILY
COMMUNITY

## 2025-03-06 RX ORDER — HYDROXYZINE HYDROCHLORIDE 25 MG/1
25 TABLET, FILM COATED ORAL DAILY PRN
Qty: 90 TABLET | Refills: 0 | Status: SHIPPED | OUTPATIENT
Start: 2025-03-06

## 2025-03-06 RX ORDER — FLUTICASONE PROPIONATE AND SALMETEROL 232; 14 UG/1; UG/1
POWDER, METERED RESPIRATORY (INHALATION)
COMMUNITY

## 2025-03-06 ASSESSMENT — ENCOUNTER SYMPTOMS
WHEEZING: 0
NAUSEA: 0
ABDOMINAL PAIN: 0
SHORTNESS OF BREATH: 0
VOMITING: 0

## 2025-03-06 NOTE — PROGRESS NOTES
patient who presents for management of hyperlipidemia, acid reflux, COPD, anxiety, and medication refills.    She is currently on low-dose statin therapy and PPI therapy , which she tolerates well. She has a history of smoking but quit years ago. Currently, she is not on any inhalers other than rescue inhalers as needed. She reports that her breathing is well managed.  She takes Atarax as needed for anxiety. She does not endorse any fever, chills, cough, shortness of breath, chest pains, nausea, vomiting, or abdominal pain.  Hx of scleroderma and follows up with rheumatology. .    MEDICATIONS  Current: statin, Protonix, Atarax    Review of Systems   Constitutional:  Negative for chills and fever.   Respiratory:  Negative for shortness of breath and wheezing.    Cardiovascular:  Negative for chest pain and leg swelling.   Gastrointestinal:  Negative for abdominal pain, nausea and vomiting.   Genitourinary:  Negative for dysuria and hematuria.   Neurological:  Negative for dizziness, weakness and numbness.   Psychiatric/Behavioral:  Negative for agitation and behavioral problems.           Objective   Blood pressure 130/70, pulse 56, temperature 98.1 °F (36.7 °C), temperature source Temporal, resp. rate 16, weight 78.5 kg (173 lb), SpO2 98%, not currently breastfeeding.  Physical Exam  Constitutional: She appears well-developed and well-nourished. No distress.     HENT:  normal  Mouth/Throat: No oropharyngeal exudate.   Eyes: Conjunctivae are normal.  No scleral icterus.   Neck:  No thyromegaly present. No tenderness  Cardiovascular: Normal rate, regular rhythm and normal heart sounds. No murmur heard.  Pulmonary/Chest: mildly diminished lung sounds. Effort normal. No respiratory distress. She has no wheezes. he has no rales.   Abdominal: Soft. no tenderness to palpation   Lymphadenopathy: She has no cervical adenopathy.   Neurological: She is alert and oriented x 3. No cranial nerve deficit. he exhibits normal muscle

## 2025-03-31 ENCOUNTER — HOSPITAL ENCOUNTER (OUTPATIENT)
Dept: PREADMISSION TESTING | Age: 75
Discharge: HOME | End: 2025-04-04

## 2025-03-31 VITALS — HEIGHT: 62 IN | BODY MASS INDEX: 31.83 KG/M2 | WEIGHT: 173 LBS

## 2025-03-31 NOTE — PROGRESS NOTES
Pre-op Instructions For Out-Patient Endoscopy Surgery    Medication Instructions:  Please stop herbs and any supplements now (includes vitamins and minerals).    For these medications:  Dulaglutide (Trulicity), Exenatide (Byetta and Bydureon, Liraglutide (Victoza), Lixisenatide (Adlyxin), Semaglutide (Ozempic and Rybelsus), Tirzepatide (Mounjaro, Zepbound)- Stop 1 week prior if taking weekly or 1 day prior if taking every 12 hours or daily.     Please contact your surgeon and prescribing physician for pre-op instructions for any blood thinners. ASPIRIN    If you have inhalers/aerosol treatments at home, please use them the morning of your surgery and bring the inhalers with you to the hospital.    Please take the following medications the morning of your surgery with a sip of water:    none    Surgery Instructions:  After midnight before surgery:  Do not eat or drink anything, including water, mints, gum, and hard candy.  You may brush your teeth without swallowing.  No smoking, chewing tobacco, or street drugs.     ** Please Follow Bowel Prep instructions if given by surgeon's office**    Please shower or bathe before surgery.       Please do not wear any cologne, lotion, powder, jewelry, piercings, perfume, makeup, nail polish, hair accessories, or hair spray on the day of surgery.  Wear loose comfortable clothing.    Leave your valuables at home but bring a payment source for any after-surgery prescriptions you plan to fill at [facility].  Bring a storage case for any glasses/contacts.    An adult who is responsible for you MUST drive you home and should be with you for the first 24 hours after surgery.     The Day of Surgery:  Arrive at [facility] Surgery Entrance at the time directed by your surgeon and check in at the desk.     If you have a living will or healthcare power of , please bring a copy.    You will be taken to the pre-op holding area where you will be prepared for

## 2025-04-01 DIAGNOSIS — I25.10 ATHEROSCLEROSIS OF NATIVE CORONARY ARTERY WITHOUT ANGINA PECTORIS, UNSPECIFIED WHETHER NATIVE OR TRANSPLANTED HEART: ICD-10-CM

## 2025-04-01 RX ORDER — ROSUVASTATIN CALCIUM 5 MG/1
5 TABLET, COATED ORAL NIGHTLY
Qty: 90 TABLET | Refills: 1 | Status: SHIPPED | OUTPATIENT
Start: 2025-04-01

## 2025-04-04 NOTE — PRE-PROCEDURE INSTRUCTIONS
No answer, left message ?                             Unable to leave message ?    When were you told to arrive at hospital ?  0845    Do you have a  ? YES    Are you on any blood thinners ?   YES                  If yes when did you stop taking ? EULALIO 3/30    Do you have your prep Rx filled and instruction ?  YES    Nothing to eat the day before , only clear liquids.    Are you experiencing any covid symptoms ? NO    Do you have any infections or rash we should be aware of ? NO      Do you have the Hibiclens soap to use the night before and the morning of surgery ? NA    Nothing to eat or drink after midnight, only a sip of water to take any medication instructed to take the night before.  Wear comfortable clothing, leave any valuables at home, remove any jewelry and body piercing .   INSTRUCTED.

## 2025-04-07 ENCOUNTER — ANESTHESIA EVENT (OUTPATIENT)
Dept: ENDOSCOPY | Age: 75
End: 2025-04-07
Payer: MEDICARE

## 2025-04-08 ENCOUNTER — ANESTHESIA (OUTPATIENT)
Dept: ENDOSCOPY | Age: 75
End: 2025-04-08
Payer: MEDICARE

## 2025-04-08 ENCOUNTER — HOSPITAL ENCOUNTER (OUTPATIENT)
Age: 75
Setting detail: OUTPATIENT SURGERY
Discharge: HOME | End: 2025-04-08
Attending: STUDENT IN AN ORGANIZED HEALTH CARE EDUCATION/TRAINING PROGRAM | Admitting: STUDENT IN AN ORGANIZED HEALTH CARE EDUCATION/TRAINING PROGRAM
Payer: MEDICARE

## 2025-04-08 VITALS
RESPIRATION RATE: 20 BRPM | DIASTOLIC BLOOD PRESSURE: 57 MMHG | HEART RATE: 54 BPM | BODY MASS INDEX: 31.83 KG/M2 | OXYGEN SATURATION: 100 % | SYSTOLIC BLOOD PRESSURE: 102 MMHG | HEIGHT: 62 IN | WEIGHT: 173 LBS | TEMPERATURE: 97.5 F

## 2025-04-08 DIAGNOSIS — R19.4 CHANGE IN BOWEL HABITS: ICD-10-CM

## 2025-04-08 DIAGNOSIS — Z80.0 FAMILY HX OF COLON CANCER: ICD-10-CM

## 2025-04-08 PROCEDURE — 2580000003 HC RX 258: Performed by: ANESTHESIOLOGY

## 2025-04-08 PROCEDURE — 3700000001 HC ADD 15 MINUTES (ANESTHESIA): Performed by: STUDENT IN AN ORGANIZED HEALTH CARE EDUCATION/TRAINING PROGRAM

## 2025-04-08 PROCEDURE — 6360000002 HC RX W HCPCS: Performed by: ANESTHESIOLOGY

## 2025-04-08 PROCEDURE — 6360000002 HC RX W HCPCS: Performed by: NURSE ANESTHETIST, CERTIFIED REGISTERED

## 2025-04-08 PROCEDURE — 3609010600 HC COLONOSCOPY POLYPECTOMY SNARE/COLD BIOPSY: Performed by: STUDENT IN AN ORGANIZED HEALTH CARE EDUCATION/TRAINING PROGRAM

## 2025-04-08 PROCEDURE — 7100000010 HC PHASE II RECOVERY - FIRST 15 MIN: Performed by: STUDENT IN AN ORGANIZED HEALTH CARE EDUCATION/TRAINING PROGRAM

## 2025-04-08 PROCEDURE — 7100000011 HC PHASE II RECOVERY - ADDTL 15 MIN: Performed by: STUDENT IN AN ORGANIZED HEALTH CARE EDUCATION/TRAINING PROGRAM

## 2025-04-08 PROCEDURE — 2709999900 HC NON-CHARGEABLE SUPPLY: Performed by: STUDENT IN AN ORGANIZED HEALTH CARE EDUCATION/TRAINING PROGRAM

## 2025-04-08 PROCEDURE — 3700000000 HC ANESTHESIA ATTENDED CARE: Performed by: STUDENT IN AN ORGANIZED HEALTH CARE EDUCATION/TRAINING PROGRAM

## 2025-04-08 PROCEDURE — 88305 TISSUE EXAM BY PATHOLOGIST: CPT

## 2025-04-08 RX ORDER — SODIUM CHLORIDE 9 MG/ML
INJECTION, SOLUTION INTRAVENOUS PRN
Status: DISCONTINUED | OUTPATIENT
Start: 2025-04-08 | End: 2025-04-08 | Stop reason: HOSPADM

## 2025-04-08 RX ORDER — PROPOFOL 10 MG/ML
INJECTION, EMULSION INTRAVENOUS
Status: DISCONTINUED | OUTPATIENT
Start: 2025-04-08 | End: 2025-04-08 | Stop reason: SDUPTHER

## 2025-04-08 RX ORDER — LIDOCAINE HYDROCHLORIDE 10 MG/ML
1 INJECTION, SOLUTION EPIDURAL; INFILTRATION; INTRACAUDAL; PERINEURAL
Status: COMPLETED | OUTPATIENT
Start: 2025-04-08 | End: 2025-04-08

## 2025-04-08 RX ORDER — SODIUM CHLORIDE, SODIUM LACTATE, POTASSIUM CHLORIDE, CALCIUM CHLORIDE 600; 310; 30; 20 MG/100ML; MG/100ML; MG/100ML; MG/100ML
INJECTION, SOLUTION INTRAVENOUS CONTINUOUS
Status: DISCONTINUED | OUTPATIENT
Start: 2025-04-08 | End: 2025-04-08 | Stop reason: HOSPADM

## 2025-04-08 RX ORDER — SIMETHICONE 40MG/0.6ML
SUSPENSION, DROPS(FINAL DOSAGE FORM)(ML) ORAL PRN
Status: DISCONTINUED | OUTPATIENT
Start: 2025-04-08 | End: 2025-04-08 | Stop reason: ALTCHOICE

## 2025-04-08 RX ORDER — SODIUM CHLORIDE 0.9 % (FLUSH) 0.9 %
5-40 SYRINGE (ML) INJECTION EVERY 12 HOURS SCHEDULED
Status: DISCONTINUED | OUTPATIENT
Start: 2025-04-08 | End: 2025-04-08 | Stop reason: HOSPADM

## 2025-04-08 RX ORDER — LIDOCAINE HYDROCHLORIDE 10 MG/ML
INJECTION, SOLUTION EPIDURAL; INFILTRATION; INTRACAUDAL; PERINEURAL
Status: DISCONTINUED | OUTPATIENT
Start: 2025-04-08 | End: 2025-04-08 | Stop reason: SDUPTHER

## 2025-04-08 RX ORDER — SODIUM CHLORIDE 0.9 % (FLUSH) 0.9 %
5-40 SYRINGE (ML) INJECTION PRN
Status: DISCONTINUED | OUTPATIENT
Start: 2025-04-08 | End: 2025-04-08 | Stop reason: HOSPADM

## 2025-04-08 RX ADMIN — PROPOFOL 150 MCG/KG/MIN: 10 INJECTION, EMULSION INTRAVENOUS at 11:29

## 2025-04-08 RX ADMIN — PROPOFOL 30 MG: 10 INJECTION, EMULSION INTRAVENOUS at 11:31

## 2025-04-08 RX ADMIN — LIDOCAINE HYDROCHLORIDE 1 ML: 10 INJECTION, SOLUTION EPIDURAL; INFILTRATION; INTRACAUDAL; PERINEURAL at 09:38

## 2025-04-08 RX ADMIN — SODIUM CHLORIDE, SODIUM LACTATE, POTASSIUM CHLORIDE, AND CALCIUM CHLORIDE: .6; .31; .03; .02 INJECTION, SOLUTION INTRAVENOUS at 09:38

## 2025-04-08 RX ADMIN — PROPOFOL 80 MG: 10 INJECTION, EMULSION INTRAVENOUS at 11:29

## 2025-04-08 RX ADMIN — LIDOCAINE HYDROCHLORIDE 40 MG: 10 INJECTION, SOLUTION EPIDURAL; INFILTRATION; INTRACAUDAL; PERINEURAL at 11:29

## 2025-04-08 ASSESSMENT — PAIN - FUNCTIONAL ASSESSMENT
PAIN_FUNCTIONAL_ASSESSMENT: CRITICAL CARE PAIN OBSERVATION TOOL (CPOT)
PAIN_FUNCTIONAL_ASSESSMENT: 0-10

## 2025-04-08 ASSESSMENT — ENCOUNTER SYMPTOMS
GASTROINTESTINAL NEGATIVE: 1
RESPIRATORY NEGATIVE: 1

## 2025-04-08 NOTE — ANESTHESIA PRE PROCEDURE
MACHINE) MISC by Does not apply route    Provider, MD Tej   aspirin 81 MG EC tablet TAKE 1 TABLET BY MOUTH EVERY DAY 1/25/23   Uzair Mcgrath, MARINA   fluticasone (FLONASE) 50 MCG/ACT nasal spray 2 sprays by Each Nostril route daily  Patient not taking: Reported on 2/3/2025 1/15/23   Carlyn Monae, APRN - CNP       Current medications:    Current Facility-Administered Medications   Medication Dose Route Frequency Provider Last Rate Last Admin   • lactated ringers infusion   IntraVENous Continuous Gallito Marrero  mL/hr at 04/08/25 0938 New Bag at 04/08/25 0938   • sodium chloride flush 0.9 % injection 5-40 mL  5-40 mL IntraVENous 2 times per day Gallito Marrero MD       • sodium chloride flush 0.9 % injection 5-40 mL  5-40 mL IntraVENous PRN Gallito Marrero MD       • 0.9 % sodium chloride infusion   IntraVENous PRN Gallito Marrero MD           Allergies:    Allergies   Allergen Reactions   • Nalbuphine      Other reaction(s): claustophobia; confusion   • Sulfa Antibiotics Hives   • Formaldehyde Rash   • Nickel Rash   • Morphine And Codeine Other (See Comments)   • Naproxen      Other reaction(s): proteinuria   • Oxycodone        Problem List:    Patient Active Problem List   Diagnosis Code   • Diverticulosis of colon K57.30   • Osteopenia M85.80   • Hyperlipidemia E78.5   • DJD (degenerative joint disease) M19.90   • Generalized OA M15.9   • Multinodular goiter E04.2   • Family history of colon cancer Z80.0   • Gastroesophageal reflux disease without esophagitis K21.9   • Elevated LFTs R79.89   • Scleroderma (HCC) M34.9   • Thyroid nodule E04.1   • Skin sensation disturbance R20.9   • Vitamin D deficiency E55.9   • Atherosclerosis of native coronary artery without angina pectoris I25.10   • Anxiety F41.9   • Cyst of kidney, acquired N28.1   • Microscopic hematuria R31.29   • Nocturia R35.1   • Postmenopausal atrophic vaginitis N95.2   • Centrilobular emphysema (HCC) J43.2   •

## 2025-04-08 NOTE — H&P
Pulmonary effort is normal.      Breath sounds: Normal breath sounds. No wheezing or rhonchi.   Abdominal:      General: Bowel sounds are normal. There is no distension.      Palpations: Abdomen is soft. There is no mass.      Tenderness: There is no abdominal tenderness.   Musculoskeletal:         General: Normal range of motion.      Cervical back: Normal range of motion and neck supple.      Right lower leg: No edema.      Left lower leg: No edema.   Skin:     General: Skin is warm and dry.      Findings: No bruising or erythema.   Neurological:      General: No focal deficit present.      Mental Status: She is alert and oriented to person, place, and time.      Motor: No weakness.      Gait: Gait normal.   Psychiatric:         Mood and Affect: Mood normal.         Behavior: Behavior normal.                   PROVISIONAL DIAGNOSES / SURGERY:      Screening colonoscopy  Family hx of colon cancer [Z80.0].    COLONOSCOPY screening    Patient Active Problem List    Diagnosis Date Noted    Postmenopausal atrophic vaginitis 10/28/2022    Cyst of kidney, acquired 03/15/2022    Microscopic hematuria 02/15/2022    Nocturia 02/15/2022    Change in bowel habits 01/23/2025    Family hx of colon cancer 01/23/2025    Arthritis of both knees 11/06/2024    Bilateral leg weakness 11/06/2024    Microalbuminuria 05/08/2024    Claustrophobia 05/08/2024    Centrilobular emphysema (HCC) 07/26/2023    Atherosclerosis of native coronary artery without angina pectoris 04/18/2022    Anxiety 04/18/2022    Thyroid nodule 08/09/2021    Skin sensation disturbance 08/09/2021    Vitamin D deficiency 08/09/2021    Scleroderma (HCC) 04/07/2021    Gastroesophageal reflux disease without esophagitis 11/22/2020    Elevated LFTs 11/22/2020    Family history of colon cancer     Multinodular goiter 02/23/2016    Hyperlipidemia 01/07/2015    DJD (degenerative joint disease) 01/07/2015    Generalized OA 01/07/2015    Osteopenia 12/17/2014

## 2025-04-08 NOTE — ANESTHESIA POSTPROCEDURE EVALUATION
Department of Anesthesiology  Postprocedure Note    Patient: Valeria Woodard  MRN: [id number]  YOB: 1950  Date of evaluation: 4/8/2025    Procedure Summary       Date: 04/08/25 Room / Location: [place] [address] / [facility]    Anesthesia Start: 1125 Anesthesia Stop: 1207    Procedure: COLONOSCOPY BIOPSY COLD SNARE POLYPECTOMY (Rectum) Diagnosis:       Change in bowel habits      Family hx of colon cancer      (Change in bowel habits [R19.4])      (Family hx of colon cancer [Z80.0])    Surgeons: Galileo Rosales MD Responsible Provider: Fred Albarado MD    Anesthesia Type: General ASA Status: 3            Anesthesia Type: General    Joaquin Phase I:      Joaquin Phase II: Joaquin Score: 4    Anesthesia Post Evaluation    Patient location during evaluation: PACU  Patient participation: complete - patient participated  Level of consciousness: awake and alert  Airway patency: patent  Nausea & Vomiting: no vomiting  Cardiovascular status: hemodynamically stable  Respiratory status: acceptable  Hydration status: euvolemic  Comments: POST- ANESTHESIA EVALUATION       Pt Name: Valeria Woodard  MRN: [id number]  YOB: 1950  Date of evaluation: 4/8/2025  Time:  12:48 PM      BP (!) 102/57   Pulse 54   Temp 97.5 °F (36.4 °C) (Infrared)   Resp 20   Ht 1.575 m (5' 2\")   Wt 78.5 kg (173 lb)   SpO2 100%   BMI 31.64 kg/m²      Consciousness Level  Awake  Cardiopulmonary Status  Stable  Pain Adequately Treated YES  Nausea / Vomiting  NO  Adequate Hydration  YES  Anesthesia Related Complications NONE      Electronically signed by Fred Albarado MD on 4/8/2025 at 12:48 PM         Pain management: satisfactory to patient    No notable events documented.

## 2025-04-08 NOTE — DISCHARGE INSTRUCTIONS
berries, grapefruits, mangoes, nectarines, oranges, peaches, pears, dried fruits (figs, dates, prunes, raisins)   Choose raw fruits and vegetables over juice, cooked, or cannedraw fruit has more fiber. Dried fruit is also a good source of fiber.   Milk   With the exception of yogurt containing inulin (a type of fiber), dairy foods provide little fiber.   Add more fiber by topping your yogurt or cottage cheese with fresh fruit, whole grain or bran cereals, nuts, or seeds.   Meats and Beans   All beans and peas, especially Garbanzo beans, kidney beans, lentils, lima beans, split peas, and vasquez beans All nuts and seeds, especially almonds, peanuts, Brazil nuts, cashews, peanut butter, walnuts, sesame and sunflower seeds All meat, poultry, fish, and eggs   Increase fiber in meat dishes by adding vasquez beans, kidney beans, black-eyed peas, bran, or oatmeal. If you are following a low-fat diet, use nuts and seeds only in moderation.   Fats and Oils   All in moderation   Fats and oils do not provide fiber   Snacks, Sweets, and Condiments   Fruit Nuts Popcorn, whole-wheat pretzels, or trail mix made with dried fruits, nuts, and seeds Cakes, breads, and cookies made with oatmeal or whole-wheat flour   Most snack foods do not provide much fiber. Choose snacks with at least 2 grams of fiber per serving.     Last Reviewed: March 2011 Valentina Ellis MS, MPH, RD   Updated: 3/29/2011

## 2025-04-10 LAB — SURGICAL PATHOLOGY REPORT: NORMAL

## 2025-04-19 ENCOUNTER — OFFICE VISIT (OUTPATIENT)
Age: 75
End: 2025-04-19

## 2025-04-19 VITALS
OXYGEN SATURATION: 93 % | HEART RATE: 67 BPM | SYSTOLIC BLOOD PRESSURE: 124 MMHG | TEMPERATURE: 98.1 F | DIASTOLIC BLOOD PRESSURE: 77 MMHG

## 2025-04-19 DIAGNOSIS — R10.84 GENERALIZED ABDOMINAL PAIN: ICD-10-CM

## 2025-04-19 DIAGNOSIS — R11.2 NAUSEA AND VOMITING, UNSPECIFIED VOMITING TYPE: Primary | ICD-10-CM

## 2025-04-19 LAB
INFLUENZA A ANTIBODY: NEGATIVE
INFLUENZA B ANTIBODY: NEGATIVE
Lab: NORMAL
QC PASS/FAIL: NORMAL
SARS-COV-2, POC: NORMAL

## 2025-04-19 RX ORDER — ONDANSETRON 4 MG/1
4 TABLET, ORALLY DISINTEGRATING ORAL 3 TIMES DAILY PRN
Qty: 21 TABLET | Refills: 0 | Status: SHIPPED | OUTPATIENT
Start: 2025-04-19

## 2025-04-19 ASSESSMENT — ENCOUNTER SYMPTOMS
SORE THROAT: 0
NAUSEA: 0
CHEST TIGHTNESS: 0
RHINORRHEA: 0
CONSTIPATION: 0
COLOR CHANGE: 0
VOMITING: 1
WHEEZING: 0
BLOOD IN STOOL: 0
DIARRHEA: 0
COUGH: 0
ABDOMINAL PAIN: 1
SHORTNESS OF BREATH: 0

## 2025-04-19 NOTE — PROGRESS NOTES
Valeria Woodard (: 1950) is a 74 y.o. female, New patient, here for evaluation of the following       Chief complaint(s): Nausea (vomit) and Vomiting        HPI    History provided by:  Patient   used: No    Vomiting   This is a new problem. The current episode started yesterday. The problem occurs 2 to 4 times per day. The problem has been unchanged. The emesis has an appearance of bile. There has been no fever. Associated symptoms include abdominal pain. Pertinent negatives include no arthralgias, chest pain, chills, coughing, diarrhea, dizziness, fever, headaches, myalgias, sweats, URI or weight loss. She has tried nothing for the symptoms.   Denies CP or SOB. Complains of generalized upset stomach/abdominal discomfort. Pertinent PMH of cholecystitis s/p cholecystectomy. Reports that she consumed chile releno and a tray of lots of high fiber foods prior to the onset of this problem. Reports last BM yesterday, normal. Requesting flu and covid testing.        PAST MEDICAL HISTORY    Past Medical History:   Diagnosis Date    Acute respiratory failure with hypoxia 2020    Anxiety     Asthma     Diverticulosis of colon     DJD (degenerative joint disease) 2015    Family history of colon cancer     Generalized OA 2015    GERD (gastroesophageal reflux disease)     Gestational diabetes mellitus     Hyperlipidemia 2015    Irritable bowel syndrome     Osteoarthritis     Pneumonia due to COVID-19 virus 2020    Precancerous skin lesion     forehead    Scleroderma (HCC)        SURGICAL HISTORY    Past Surgical History:   Procedure Laterality Date    CHOLECYSTECTOMY      COLONOSCOPY      normal    COLONOSCOPY  2014    diverticulosis    COLONOSCOPY N/A 2018    COLONOSCOPY POLYPECTOMY COLD BIOPSY performed by Nick Rivera MD at [facility] OR    COLONOSCOPY  2018    Spasms in the sigmoid colon.  No colitis or ileitis seen.    COLONOSCOPY N/A

## 2025-07-07 ENCOUNTER — HOSPITAL ENCOUNTER (OUTPATIENT)
Age: 75
Setting detail: SPECIMEN
Discharge: HOME | End: 2025-07-07

## 2025-07-07 DIAGNOSIS — E78.2 MIXED HYPERLIPIDEMIA: ICD-10-CM

## 2025-07-07 DIAGNOSIS — K21.9 GASTROESOPHAGEAL REFLUX DISEASE WITHOUT ESOPHAGITIS: ICD-10-CM

## 2025-07-07 LAB
ALBUMIN SERPL-MCNC: 4.3 G/DL (ref 3.5–5.2)
ALBUMIN/GLOB SERPL: 1.3 {RATIO} (ref 1–2.5)
ALP SERPL-CCNC: 86 U/L (ref 35–104)
ALT SERPL-CCNC: 24 U/L (ref 10–35)
ANION GAP SERPL CALCULATED.3IONS-SCNC: 13 MMOL/L (ref 9–16)
AST SERPL-CCNC: 27 U/L (ref 10–35)
BILIRUB SERPL-MCNC: 0.5 MG/DL (ref 0–1.2)
BUN SERPL-MCNC: 18 MG/DL (ref 8–23)
CALCIUM SERPL-MCNC: 9.9 MG/DL (ref 8.6–10.4)
CHLORIDE SERPL-SCNC: 104 MMOL/L (ref 98–107)
CHOLEST SERPL-MCNC: 138 MG/DL (ref 0–199)
CHOLESTEROL/HDL RATIO: 2.4
CO2 SERPL-SCNC: 25 MMOL/L (ref 20–31)
CREAT SERPL-MCNC: 0.8 MG/DL (ref 0.6–0.9)
GFR, ESTIMATED: 78 ML/MIN/1.73M2
GLUCOSE SERPL-MCNC: 99 MG/DL (ref 74–99)
HDLC SERPL-MCNC: 58 MG/DL
LDLC SERPL CALC-MCNC: 59 MG/DL (ref 0–100)
MAGNESIUM SERPL-MCNC: 2.2 MG/DL (ref 1.6–2.4)
POTASSIUM SERPL-SCNC: 4 MMOL/L (ref 3.7–5.3)
PROT SERPL-MCNC: 7.6 G/DL (ref 6.6–8.7)
SODIUM SERPL-SCNC: 142 MMOL/L (ref 136–145)
TRIGL SERPL-MCNC: 106 MG/DL
VLDLC SERPL CALC-MCNC: 21 MG/DL (ref 1–30)

## 2025-07-07 SDOH — HEALTH STABILITY: PHYSICAL HEALTH: ON AVERAGE, HOW MANY MINUTES DO YOU ENGAGE IN EXERCISE AT THIS LEVEL?: 20 MIN

## 2025-07-07 SDOH — HEALTH STABILITY: PHYSICAL HEALTH: ON AVERAGE, HOW MANY DAYS PER WEEK DO YOU ENGAGE IN MODERATE TO STRENUOUS EXERCISE (LIKE A BRISK WALK)?: 2 DAYS

## 2025-07-07 ASSESSMENT — LIFESTYLE VARIABLES
HOW MANY STANDARD DRINKS CONTAINING ALCOHOL DO YOU HAVE ON A TYPICAL DAY: 1 OR 2
HOW MANY STANDARD DRINKS CONTAINING ALCOHOL DO YOU HAVE ON A TYPICAL DAY: 1
HOW OFTEN DO YOU HAVE A DRINK CONTAINING ALCOHOL: MONTHLY OR LESS
HOW OFTEN DO YOU HAVE SIX OR MORE DRINKS ON ONE OCCASION: 98
HOW OFTEN DO YOU HAVE A DRINK CONTAINING ALCOHOL: 2

## 2025-07-07 ASSESSMENT — PATIENT HEALTH QUESTIONNAIRE - PHQ9
2. FEELING DOWN, DEPRESSED OR HOPELESS: NOT AT ALL
SUM OF ALL RESPONSES TO PHQ QUESTIONS 1-9: 0
1. LITTLE INTEREST OR PLEASURE IN DOING THINGS: NOT AT ALL
SUM OF ALL RESPONSES TO PHQ QUESTIONS 1-9: 0

## 2025-07-08 ENCOUNTER — RESULTS FOLLOW-UP (OUTPATIENT)
Dept: FAMILY MEDICINE CLINIC | Age: 75
End: 2025-07-08

## 2025-07-11 ENCOUNTER — OFFICE VISIT (OUTPATIENT)
Dept: FAMILY MEDICINE CLINIC | Age: 75
End: 2025-07-11
Payer: MEDICARE

## 2025-07-11 VITALS
RESPIRATION RATE: 16 BRPM | BODY MASS INDEX: 32.13 KG/M2 | OXYGEN SATURATION: 92 % | TEMPERATURE: 97.9 F | SYSTOLIC BLOOD PRESSURE: 110 MMHG | WEIGHT: 174.6 LBS | HEART RATE: 60 BPM | HEIGHT: 62 IN | DIASTOLIC BLOOD PRESSURE: 60 MMHG

## 2025-07-11 DIAGNOSIS — Z00.00 MEDICARE ANNUAL WELLNESS VISIT, SUBSEQUENT: Primary | ICD-10-CM

## 2025-07-11 PROCEDURE — 1123F ACP DISCUSS/DSCN MKR DOCD: CPT | Performed by: NURSE PRACTITIONER

## 2025-07-11 PROCEDURE — 1159F MED LIST DOCD IN RCRD: CPT | Performed by: NURSE PRACTITIONER

## 2025-07-11 PROCEDURE — 3017F COLORECTAL CA SCREEN DOC REV: CPT | Performed by: NURSE PRACTITIONER

## 2025-07-11 PROCEDURE — G0439 PPPS, SUBSEQ VISIT: HCPCS | Performed by: NURSE PRACTITIONER

## 2025-07-11 RX ORDER — HYDROXYZINE HYDROCHLORIDE 25 MG/1
25 TABLET, FILM COATED ORAL DAILY PRN
Qty: 90 TABLET | Refills: 0 | Status: SHIPPED | OUTPATIENT
Start: 2025-07-11

## 2025-07-11 NOTE — PATIENT INSTRUCTIONS
Learning About Being Active as an Older Adult  Why is being active important as you get older?     Being active is one of the best things you can do for your health. And it's never too late to start. Being active--or getting active, if you aren't already--has definite benefits. It can:  Give you more energy,  Keep your mind sharp.  Improve balance to reduce your risk of falls.  Help you manage chronic illness with fewer medicines.  No matter how old you are, how fit you are, or what health problems you have, there is a form of activity that will work for you. And the more physical activity you can do, the better your overall health will be.  What kinds of activity can help you stay healthy?  Being more active will make your daily activities easier. Physical activity includes planned exercise and things you do in daily life. There are four types of activity:  Aerobic.  Doing aerobic activity makes your heart and lungs strong.  Includes walking, dancing, and gardening.  Aim for at least 2½ hours spread throughout the week.  It improves your energy and can help you sleep better.  Muscle-strengthening.  This type of activity can help maintain muscle and strengthen bones.  Includes climbing stairs, using resistance bands, and lifting or carrying heavy loads.  Aim for at least twice a week.  It can help protect the knees and other joints.  Stretching.  Stretching gives you better range of motion in joints and muscles.  Includes upper arm stretches, calf stretches, and gentle yoga.  Aim for at least twice a week, preferably after your muscles are warmed up from other activities.  It can help you function better in daily life.  Balancing.  This helps you stay coordinated and have good posture.  Includes heel-to-toe walking, apurva chi, and certain types of yoga.  Aim for at least 3 days a week.  It can reduce your risk of falling.  Even if you have a hard time meeting the recommendations, it's better to be more active

## 2025-07-11 NOTE — PROGRESS NOTES
in written form: see Patient Instructions/AVS.     Reviewed and updated this visit:  Tobacco  Allergies  Meds  Sexual Hx

## 2025-07-22 ENCOUNTER — OFFICE VISIT (OUTPATIENT)
Dept: GASTROENTEROLOGY | Age: 75
End: 2025-07-22
Payer: MEDICARE

## 2025-07-22 VITALS
HEART RATE: 67 BPM | SYSTOLIC BLOOD PRESSURE: 142 MMHG | HEIGHT: 62 IN | WEIGHT: 177 LBS | DIASTOLIC BLOOD PRESSURE: 83 MMHG | OXYGEN SATURATION: 96 % | BODY MASS INDEX: 32.57 KG/M2

## 2025-07-22 DIAGNOSIS — K21.9 GASTROESOPHAGEAL REFLUX DISEASE, UNSPECIFIED WHETHER ESOPHAGITIS PRESENT: ICD-10-CM

## 2025-07-22 DIAGNOSIS — D12.6 ADENOMATOUS POLYP OF COLON, UNSPECIFIED PART OF COLON: Primary | ICD-10-CM

## 2025-07-22 DIAGNOSIS — K57.90 DIVERTICULOSIS: ICD-10-CM

## 2025-07-22 PROBLEM — Z47.1 AFTERCARE FOLLOWING JOINT REPLACEMENT SURGERY: Status: ACTIVE | Noted: 2024-05-06

## 2025-07-22 PROBLEM — M25.562 LEFT KNEE PAIN: Status: ACTIVE | Noted: 2023-02-13

## 2025-07-22 PROCEDURE — 1090F PRES/ABSN URINE INCON ASSESS: CPT | Performed by: STUDENT IN AN ORGANIZED HEALTH CARE EDUCATION/TRAINING PROGRAM

## 2025-07-22 PROCEDURE — G8417 CALC BMI ABV UP PARAM F/U: HCPCS | Performed by: STUDENT IN AN ORGANIZED HEALTH CARE EDUCATION/TRAINING PROGRAM

## 2025-07-22 PROCEDURE — 1159F MED LIST DOCD IN RCRD: CPT | Performed by: STUDENT IN AN ORGANIZED HEALTH CARE EDUCATION/TRAINING PROGRAM

## 2025-07-22 PROCEDURE — 99214 OFFICE O/P EST MOD 30 MIN: CPT | Performed by: STUDENT IN AN ORGANIZED HEALTH CARE EDUCATION/TRAINING PROGRAM

## 2025-07-22 PROCEDURE — G8427 DOCREV CUR MEDS BY ELIG CLIN: HCPCS | Performed by: STUDENT IN AN ORGANIZED HEALTH CARE EDUCATION/TRAINING PROGRAM

## 2025-07-22 PROCEDURE — 1036F TOBACCO NON-USER: CPT | Performed by: STUDENT IN AN ORGANIZED HEALTH CARE EDUCATION/TRAINING PROGRAM

## 2025-07-22 PROCEDURE — G8399 PT W/DXA RESULTS DOCUMENT: HCPCS | Performed by: STUDENT IN AN ORGANIZED HEALTH CARE EDUCATION/TRAINING PROGRAM

## 2025-07-22 PROCEDURE — 1126F AMNT PAIN NOTED NONE PRSNT: CPT | Performed by: STUDENT IN AN ORGANIZED HEALTH CARE EDUCATION/TRAINING PROGRAM

## 2025-07-22 PROCEDURE — 3017F COLORECTAL CA SCREEN DOC REV: CPT | Performed by: STUDENT IN AN ORGANIZED HEALTH CARE EDUCATION/TRAINING PROGRAM

## 2025-07-22 PROCEDURE — 1123F ACP DISCUSS/DSCN MKR DOCD: CPT | Performed by: STUDENT IN AN ORGANIZED HEALTH CARE EDUCATION/TRAINING PROGRAM

## 2025-07-22 NOTE — PROGRESS NOTES
Reason for Referral:     No referring provider defined for this encounter.    Chief Complaint   Patient presents with    Follow Up After Procedure     Colonoscopy (4/8/25)       1. Adenomatous polyp of colon, unspecified part of colon    2. Diverticulosis    3. Gastroesophageal reflux disease, unspecified whether esophagitis present        HISTORY OF PRESENT ILLNESS: Ms.Sharon MAREK Woodard is a very pleasant 74 y.o. female with a past history remarkable for GERD, IBS, scleroderma, follows up post colonoscopy    7/22/2025: 8 tubular adenoma removed.  Her GERD due to crest is completely under control with Protonix.  Had vomiting attack recurrent issue however rare and only happens every couple of years.  Denies any abdominal pain pain. No sick contacts. No dysphagia.      1/23/25: Patient has crest.  She has been having problem with reflux for many years that she requires 1 Protonix every day and if she gets off of it she has bad rebound reflux.  This visit is mainly to establish care for Protonix refill.  She also has history of colorectal cancer and she is overdue for it.  No bleeding, no constipation or diarrhea.     1/2023: On Protonix every other day for her GERD. She has scleroderma and she has to refill the protonix     [facility]  Father age of 65 and grandpa had colon cancer     Previous Endoscopies  4/8/25  5 mm polyp in cecum-cold forceps polypectomy TA  4 polyps 4 to 10 mm ascending-cold forceps and cold snare TA  3 polyps 4 to 5 mm in transverse-cold forceps and cold snare TA  Medium sigmoid diverticulosis    EGD 2/2023  Normal     Colonoscopy 2/2018  Repeat 10 years    Previous GI workup     Patient's PMH/PSH,SH,PSYCH Hx, MEDs, ALLERGIES, and ROS were all reviewed and updated in the appropriate sections.  I did review all the labs results available for the labs which were ordered by the primary care physician, and the other consultants, we search on [employer] at [facility] and all the available care everywhere [employer]  I

## (undated) DEVICE — DEFENDO AIR WATER SUCTION AND BIOPSY VALVE KIT FOR  OLYMPUS: Brand: DEFENDO AIR/WATER/SUCTION AND BIOPSY VALVE

## (undated) DEVICE — AIRLIFE™ NASAL OXYGEN CANNULA CURVED, FLARED TIP, WITH 7 FEET (2.1 M) CRUSH RESISTANT TUBING, OVER-THE-EAR STYLE: Brand: AIRLIFE™

## (undated) DEVICE — ENDO KIT W/SYRINGE: Brand: MEDLINE INDUSTRIES, INC.

## (undated) DEVICE — GLOVE ORANGE PI 7   MSG9070

## (undated) DEVICE — POLYP TRAP: Brand: TRAPEASE®

## (undated) DEVICE — FORCEPS BX L240CM WRK CHN 2.8MM STD CAP W/ NDL MIC MESH

## (undated) DEVICE — BITEBLOCK 54FR W/ DENT RIM BLOX

## (undated) DEVICE — SNARE ENDOSCP AD L240CM LOOP W10MM SHTH DIA2.4MM RND INSUL

## (undated) DEVICE — FORCEPS BX L240CM JAW DIA22MM ORNG STD CAP W NDL RAD JAW 4

## (undated) DEVICE — FORCEPS BX L240CM JAW DIA2.8MM L CAP W/ NDL MIC MESH TOOTH

## (undated) DEVICE — DUP USE 393023 JELLY LUBRICATING EZ 2OZ

## (undated) DEVICE — GLOVE ORANGE PI 8 1/2   MSG9085